# Patient Record
Sex: FEMALE | Race: BLACK OR AFRICAN AMERICAN | Employment: UNEMPLOYED | URBAN - METROPOLITAN AREA
[De-identification: names, ages, dates, MRNs, and addresses within clinical notes are randomized per-mention and may not be internally consistent; named-entity substitution may affect disease eponyms.]

---

## 2019-10-11 ENCOUNTER — OFFICE VISIT (OUTPATIENT)
Dept: FAMILY MEDICINE CLINIC | Age: 39
End: 2019-10-11
Payer: COMMERCIAL

## 2019-10-11 VITALS
OXYGEN SATURATION: 90 % | DIASTOLIC BLOOD PRESSURE: 88 MMHG | TEMPERATURE: 96.5 F | HEART RATE: 78 BPM | RESPIRATION RATE: 16 BRPM | SYSTOLIC BLOOD PRESSURE: 138 MMHG | HEIGHT: 65 IN | WEIGHT: 165.8 LBS | BODY MASS INDEX: 27.63 KG/M2

## 2019-10-11 DIAGNOSIS — Z00.00 PREVENTATIVE HEALTH CARE: ICD-10-CM

## 2019-10-11 DIAGNOSIS — N92.6 MISSED PERIODS: ICD-10-CM

## 2019-10-11 DIAGNOSIS — Z86.79 HISTORY OF HYPERTENSION: Primary | ICD-10-CM

## 2019-10-11 LAB
HCG, URINE, POC: NEGATIVE
Lab: NORMAL
NEGATIVE QC PASS/FAIL: NORMAL
POSITIVE QC PASS/FAIL: NORMAL

## 2019-10-11 PROCEDURE — G8427 DOCREV CUR MEDS BY ELIG CLIN: HCPCS | Performed by: INTERNAL MEDICINE

## 2019-10-11 PROCEDURE — 81025 URINE PREGNANCY TEST: CPT | Performed by: INTERNAL MEDICINE

## 2019-10-11 PROCEDURE — G8484 FLU IMMUNIZE NO ADMIN: HCPCS | Performed by: INTERNAL MEDICINE

## 2019-10-11 PROCEDURE — 99204 OFFICE O/P NEW MOD 45 MIN: CPT | Performed by: INTERNAL MEDICINE

## 2019-10-11 PROCEDURE — 1036F TOBACCO NON-USER: CPT | Performed by: INTERNAL MEDICINE

## 2019-10-11 PROCEDURE — G8419 CALC BMI OUT NRM PARAM NOF/U: HCPCS | Performed by: INTERNAL MEDICINE

## 2019-10-11 ASSESSMENT — PATIENT HEALTH QUESTIONNAIRE - PHQ9
SUM OF ALL RESPONSES TO PHQ9 QUESTIONS 1 & 2: 0
SUM OF ALL RESPONSES TO PHQ QUESTIONS 1-9: 0
2. FEELING DOWN, DEPRESSED OR HOPELESS: 0
SUM OF ALL RESPONSES TO PHQ QUESTIONS 1-9: 0
1. LITTLE INTEREST OR PLEASURE IN DOING THINGS: 0

## 2019-10-12 ASSESSMENT — ENCOUNTER SYMPTOMS
WHEEZING: 0
VOMITING: 0
SHORTNESS OF BREATH: 0
NAUSEA: 0
DIARRHEA: 0
ABDOMINAL PAIN: 0
COUGH: 0

## 2019-10-18 DIAGNOSIS — Z00.00 PREVENTATIVE HEALTH CARE: ICD-10-CM

## 2019-10-18 LAB
ALBUMIN SERPL-MCNC: 4.3 G/DL (ref 3.5–4.6)
ALP BLD-CCNC: 85 U/L (ref 40–130)
ALT SERPL-CCNC: 13 U/L (ref 0–33)
ANION GAP SERPL CALCULATED.3IONS-SCNC: 13 MEQ/L (ref 9–15)
AST SERPL-CCNC: 20 U/L (ref 0–35)
BASOPHILS ABSOLUTE: 0.1 K/UL (ref 0–0.2)
BASOPHILS RELATIVE PERCENT: 1.6 %
BILIRUB SERPL-MCNC: 0.4 MG/DL (ref 0.2–0.7)
BUN BLDV-MCNC: 11 MG/DL (ref 6–20)
CALCIUM SERPL-MCNC: 9.3 MG/DL (ref 8.5–9.9)
CHLORIDE BLD-SCNC: 102 MEQ/L (ref 95–107)
CHOLESTEROL, TOTAL: 167 MG/DL (ref 0–199)
CO2: 25 MEQ/L (ref 20–31)
CREAT SERPL-MCNC: 0.54 MG/DL (ref 0.5–0.9)
EOSINOPHILS ABSOLUTE: 0.2 K/UL (ref 0–0.7)
EOSINOPHILS RELATIVE PERCENT: 2.8 %
GFR AFRICAN AMERICAN: >60
GFR NON-AFRICAN AMERICAN: >60
GLOBULIN: 3.2 G/DL (ref 2.3–3.5)
GLUCOSE BLD-MCNC: 74 MG/DL (ref 70–99)
HBA1C MFR BLD: 5.4 % (ref 4.8–5.9)
HCT VFR BLD CALC: 42.1 % (ref 37–47)
HDLC SERPL-MCNC: 55 MG/DL (ref 40–59)
HEMOGLOBIN: 13.5 G/DL (ref 12–16)
LDL CHOLESTEROL CALCULATED: 98 MG/DL (ref 0–129)
LYMPHOCYTES ABSOLUTE: 2 K/UL (ref 1–4.8)
LYMPHOCYTES RELATIVE PERCENT: 29.1 %
MCH RBC QN AUTO: 27.4 PG (ref 27–31.3)
MCHC RBC AUTO-ENTMCNC: 32.2 % (ref 33–37)
MCV RBC AUTO: 85.1 FL (ref 82–100)
MONOCYTES ABSOLUTE: 0.5 K/UL (ref 0.2–0.8)
MONOCYTES RELATIVE PERCENT: 6.9 %
NEUTROPHILS ABSOLUTE: 4.1 K/UL (ref 1.4–6.5)
NEUTROPHILS RELATIVE PERCENT: 59.6 %
PDW BLD-RTO: 13.4 % (ref 11.5–14.5)
PLATELET # BLD: 243 K/UL (ref 130–400)
POTASSIUM SERPL-SCNC: 4.2 MEQ/L (ref 3.4–4.9)
RBC # BLD: 4.95 M/UL (ref 4.2–5.4)
SODIUM BLD-SCNC: 140 MEQ/L (ref 135–144)
TOTAL PROTEIN: 7.5 G/DL (ref 6.3–8)
TRIGL SERPL-MCNC: 70 MG/DL (ref 0–150)
TSH SERPL DL<=0.05 MIU/L-ACNC: 0.73 UIU/ML (ref 0.44–3.86)
WBC # BLD: 6.9 K/UL (ref 4.8–10.8)

## 2019-10-20 LAB — HIV 1,2 COMBO ANTIGEN/ANTIBODY: NEGATIVE

## 2019-10-24 ENCOUNTER — OFFICE VISIT (OUTPATIENT)
Dept: FAMILY MEDICINE CLINIC | Age: 39
End: 2019-10-24
Payer: COMMERCIAL

## 2019-10-24 VITALS
RESPIRATION RATE: 16 BRPM | TEMPERATURE: 97.7 F | SYSTOLIC BLOOD PRESSURE: 160 MMHG | OXYGEN SATURATION: 91 % | DIASTOLIC BLOOD PRESSURE: 102 MMHG | WEIGHT: 169.4 LBS | HEART RATE: 90 BPM | BODY MASS INDEX: 28.22 KG/M2 | HEIGHT: 65 IN

## 2019-10-24 DIAGNOSIS — N91.1 SECONDARY AMENORRHEA: ICD-10-CM

## 2019-10-24 DIAGNOSIS — I10 UNCONTROLLED STAGE 2 HYPERTENSION: ICD-10-CM

## 2019-10-24 DIAGNOSIS — N91.2 ABSENT PERIODS: Primary | ICD-10-CM

## 2019-10-24 LAB
FOLLICLE STIMULATING HORMONE: 9.4 MIU/ML
HCG, URINE, POC: NEGATIVE
Lab: NORMAL
NEGATIVE QC PASS/FAIL: NORMAL
POSITIVE QC PASS/FAIL: NORMAL
PROLACTIN: 7.6 NG/ML

## 2019-10-24 PROCEDURE — 1036F TOBACCO NON-USER: CPT | Performed by: INTERNAL MEDICINE

## 2019-10-24 PROCEDURE — G8484 FLU IMMUNIZE NO ADMIN: HCPCS | Performed by: INTERNAL MEDICINE

## 2019-10-24 PROCEDURE — G8427 DOCREV CUR MEDS BY ELIG CLIN: HCPCS | Performed by: INTERNAL MEDICINE

## 2019-10-24 PROCEDURE — 81025 URINE PREGNANCY TEST: CPT | Performed by: INTERNAL MEDICINE

## 2019-10-24 PROCEDURE — G8419 CALC BMI OUT NRM PARAM NOF/U: HCPCS | Performed by: INTERNAL MEDICINE

## 2019-10-24 PROCEDURE — 99214 OFFICE O/P EST MOD 30 MIN: CPT | Performed by: INTERNAL MEDICINE

## 2019-10-24 RX ORDER — AMLODIPINE BESYLATE 5 MG/1
5 TABLET ORAL DAILY
Qty: 30 TABLET | Refills: 5 | Status: SHIPPED | OUTPATIENT
Start: 2019-10-24 | End: 2020-07-24 | Stop reason: SDUPTHER

## 2019-10-24 RX ORDER — LOSARTAN POTASSIUM 50 MG/1
50 TABLET ORAL DAILY
Qty: 30 TABLET | Refills: 3 | Status: SHIPPED | OUTPATIENT
Start: 2019-10-24 | End: 2020-07-24 | Stop reason: SDUPTHER

## 2019-10-24 RX ORDER — LOSARTAN POTASSIUM 50 MG/1
50 TABLET ORAL 2 TIMES DAILY
Qty: 30 TABLET | Refills: 3 | Status: SHIPPED | OUTPATIENT
Start: 2019-10-24 | End: 2019-10-24

## 2019-10-24 ASSESSMENT — ENCOUNTER SYMPTOMS
WHEEZING: 0
ABDOMINAL PAIN: 0
SHORTNESS OF BREATH: 0
COUGH: 0
DIARRHEA: 0
VOMITING: 0
NAUSEA: 0

## 2019-10-31 LAB — TESTOSTERONE FREE: 1.6 PG/ML (ref 1.3–9.2)

## 2020-02-07 ENCOUNTER — TELEPHONE (OUTPATIENT)
Dept: FAMILY MEDICINE CLINIC | Age: 40
End: 2020-02-07

## 2020-02-07 NOTE — TELEPHONE ENCOUNTER
Pt called to request a new prescription for birth control. She was not sure if she needs to be seen first, or if this can just be called in. Could you please advise? Thank you.

## 2020-02-25 ENCOUNTER — OFFICE VISIT (OUTPATIENT)
Dept: FAMILY MEDICINE CLINIC | Age: 40
End: 2020-02-25
Payer: COMMERCIAL

## 2020-02-25 VITALS
RESPIRATION RATE: 12 BRPM | OXYGEN SATURATION: 99 % | BODY MASS INDEX: 29.04 KG/M2 | WEIGHT: 174.3 LBS | HEART RATE: 80 BPM | DIASTOLIC BLOOD PRESSURE: 84 MMHG | SYSTOLIC BLOOD PRESSURE: 126 MMHG | HEIGHT: 65 IN | TEMPERATURE: 98.3 F

## 2020-02-25 LAB
HCG, URINE, POC: NEGATIVE
Lab: NORMAL
NEGATIVE QC PASS/FAIL: NORMAL
POSITIVE QC PASS/FAIL: NORMAL

## 2020-02-25 PROCEDURE — G8427 DOCREV CUR MEDS BY ELIG CLIN: HCPCS | Performed by: INTERNAL MEDICINE

## 2020-02-25 PROCEDURE — G8484 FLU IMMUNIZE NO ADMIN: HCPCS | Performed by: INTERNAL MEDICINE

## 2020-02-25 PROCEDURE — G8419 CALC BMI OUT NRM PARAM NOF/U: HCPCS | Performed by: INTERNAL MEDICINE

## 2020-02-25 PROCEDURE — 1036F TOBACCO NON-USER: CPT | Performed by: INTERNAL MEDICINE

## 2020-02-25 PROCEDURE — 81025 URINE PREGNANCY TEST: CPT | Performed by: INTERNAL MEDICINE

## 2020-02-25 PROCEDURE — 99214 OFFICE O/P EST MOD 30 MIN: CPT | Performed by: INTERNAL MEDICINE

## 2020-02-25 RX ORDER — NORETHINDRONE ACETATE AND ETHINYL ESTRADIOL 1MG-20(21)
1 KIT ORAL DAILY
Qty: 1 PACKET | Refills: 3 | Status: SHIPPED | OUTPATIENT
Start: 2020-02-25 | End: 2020-07-01 | Stop reason: SDUPTHER

## 2020-02-25 ASSESSMENT — PATIENT HEALTH QUESTIONNAIRE - PHQ9
SUM OF ALL RESPONSES TO PHQ QUESTIONS 1-9: 0
SUM OF ALL RESPONSES TO PHQ QUESTIONS 1-9: 0
1. LITTLE INTEREST OR PLEASURE IN DOING THINGS: 0
SUM OF ALL RESPONSES TO PHQ9 QUESTIONS 1 & 2: 0
2. FEELING DOWN, DEPRESSED OR HOPELESS: 0

## 2020-02-25 NOTE — PROGRESS NOTES
sexual activity: Not on file   Other Topics Concern    Not on file   Social History Narrative    Not on file     No family history on file. Allergies:  Patient has no known allergies. There is no problem list on file for this patient. Current Outpatient Medications on File Prior to Visit   Medication Sig Dispense Refill    amLODIPine (NORVASC) 5 MG tablet Take 1 tablet by mouth daily 30 tablet 5    losartan (COZAAR) 50 MG tablet Take 1 tablet by mouth daily 30 tablet 3     No current facility-administered medications on file prior to visit. Review of Systems   Constitutional: Negative for chills, diaphoresis, fatigue and fever. HENT: Negative for congestion, ear discharge and ear pain. Respiratory: Negative for cough, shortness of breath and wheezing. Cardiovascular: Negative for chest pain. Gastrointestinal: Negative for abdominal pain, diarrhea, nausea and vomiting. Endocrine: Negative for cold intolerance and heat intolerance. Genitourinary: Negative for dysuria and frequency. Musculoskeletal: Positive for myalgias. Neurological: Negative for dizziness and light-headedness. Objective:   /84   Pulse 80   Temp 98.3 °F (36.8 °C) (Temporal)   Resp 12   Ht 5' 5\" (1.651 m)   Wt 174 lb 4.8 oz (79.1 kg)   LMP 02/03/2020 (Exact Date)   SpO2 99%   Breastfeeding No   BMI 29.01 kg/m²     Physical Exam  Constitutional:       General: She is not in acute distress. Appearance: Normal appearance. She is well-developed. Cardiovascular:      Rate and Rhythm: Normal rate and regular rhythm. Pulses: Normal pulses. Heart sounds: Normal heart sounds. Pulmonary:      Effort: Pulmonary effort is normal. No respiratory distress. Breath sounds: Normal breath sounds. Abdominal:      General: Bowel sounds are normal. There is no distension. Palpations: Abdomen is soft. Tenderness: There is no abdominal tenderness. There is no guarding.    Musculoskeletal: Comments: No groin or thigh swelling/erythema  Marked right medial thigh TTP  Hip ROM full in flexion, extension, adduction, abduction, internal and external rotations   Neurological:      Mental Status: She is alert. Assessment:       Diagnosis Orders   1. Tendinosis of quadriceps tendon  Amb External Referral To Physical Therapy   2. Encounter for initial prescription of contraceptive pills  POC Pregnancy Ur-Qual    norethindrone-ethinyl estradiol (LOESTRIN FE 1/20) 1-20 MG-MCG per tablet   3. History of hypertension         Plan:      Orders Placed This Encounter   Procedures    Amb External Referral To Physical Therapy     Referral Priority:   Routine     Referral Type:   Consult for Advice and Opinion     Requested Specialty:   Physical Therapy     Number of Visits Requested:   1    POC Pregnancy Ur-Qual   Preg test neg. Orders Placed This Encounter   Medications    norethindrone-ethinyl estradiol (LOESTRIN FE 1/20) 1-20 MG-MCG per tablet     Sig: Take 1 tablet by mouth daily     Dispense:  1 packet     Refill:  3     Return in about 1 month (around 3/25/2020) for BP recheck.

## 2020-02-26 ASSESSMENT — ENCOUNTER SYMPTOMS
VOMITING: 0
ABDOMINAL PAIN: 0
SHORTNESS OF BREATH: 0
COUGH: 0
NAUSEA: 0
DIARRHEA: 0
WHEEZING: 0

## 2020-07-01 RX ORDER — NORETHINDRONE ACETATE AND ETHINYL ESTRADIOL 1MG-20(21)
1 KIT ORAL DAILY
Qty: 1 PACKET | Refills: 3 | Status: SHIPPED | OUTPATIENT
Start: 2020-07-01 | End: 2020-12-16 | Stop reason: SDUPTHER

## 2020-07-01 NOTE — TELEPHONE ENCOUNTER
Pharmacy  requesting medication refill. Please approve or deny this request.    Rx requested:  Requested Prescriptions     Pending Prescriptions Disp Refills    norethindrone-ethinyl estradiol (LOESTRIN FE 1/20) 1-20 MG-MCG per tablet 1 packet 3     Sig: Take 1 tablet by mouth daily         Last Office Visit:   2/25/2020      Next Visit Date:  No future appointments.

## 2020-07-10 RX ORDER — NORETHINDRONE ACETATE AND ETHINYL ESTRADIOL AND FERROUS FUMARATE 1MG-20(21)
KIT ORAL
Qty: 28 TABLET | Refills: 0 | OUTPATIENT
Start: 2020-07-10

## 2020-07-24 RX ORDER — LOSARTAN POTASSIUM 50 MG/1
50 TABLET ORAL DAILY
Qty: 90 TABLET | Refills: 3 | Status: SHIPPED | OUTPATIENT
Start: 2020-07-24 | End: 2021-09-16

## 2020-07-24 RX ORDER — AMLODIPINE BESYLATE 5 MG/1
5 TABLET ORAL DAILY
Qty: 90 TABLET | Refills: 5 | Status: SHIPPED | OUTPATIENT
Start: 2020-07-24 | End: 2021-09-16

## 2020-07-24 NOTE — TELEPHONE ENCOUNTER
I got request for med refill. pls ask if she decided to take the BP meds. If still trying diet and exercise only, it's OK by me as long as BP is kept below 130/80.

## 2020-07-28 NOTE — TELEPHONE ENCOUNTER
Patient states she does not take it all the time. She states it does help keep her BP under the radar, so she takes it occasionally.

## 2020-08-12 ENCOUNTER — TELEPHONE (OUTPATIENT)
Dept: PRIMARY CARE CLINIC | Age: 40
End: 2020-08-12

## 2020-08-12 NOTE — TELEPHONE ENCOUNTER
Patient calling in, asking about changing her Birth Control medication, wanted to get Depo injections instead.   She had them in the past and prefers to have them again  Please advise

## 2020-08-25 ENCOUNTER — NURSE ONLY (OUTPATIENT)
Dept: PRIMARY CARE CLINIC | Age: 40
End: 2020-08-25
Payer: COMMERCIAL

## 2020-08-25 PROCEDURE — 96372 THER/PROPH/DIAG INJ SC/IM: CPT | Performed by: INTERNAL MEDICINE

## 2020-08-25 RX ORDER — MEDROXYPROGESTERONE ACETATE 150 MG/ML
150 INJECTION, SUSPENSION INTRAMUSCULAR ONCE
Status: COMPLETED | OUTPATIENT
Start: 2020-08-25 | End: 2020-08-25

## 2020-08-25 RX ADMIN — MEDROXYPROGESTERONE ACETATE 150 MG: 150 INJECTION, SUSPENSION INTRAMUSCULAR at 12:30

## 2020-08-26 ENCOUNTER — TELEPHONE (OUTPATIENT)
Dept: PRIMARY CARE CLINIC | Age: 40
End: 2020-08-26

## 2020-08-26 NOTE — TELEPHONE ENCOUNTER
Dr Amanda Cabrales,  I also tried calling patient, I wasn't able to reach patient. I could only leave our phone number on her vm and no message it said SMS messaging.     I will wait for patient to call us back is all I can do  Elle

## 2020-08-26 NOTE — TELEPHONE ENCOUNTER
----- Message from Jorge Smith MD sent at 8/25/2020 10:10 PM EDT -----  She asked for the depot contraceptive. Did she get this injection yet? I did not yet approve it because I wanted to give consideration to contraindications. First I need a pregnancy test. Please have her come in for poc pregnancy test and I'll cosign it. Second, how has her BP been? I'm concerned that with uncontrolled BP, this she might not be a good idea. I need her to ome into the office to check BP. Also please ask about any hx of migraine, heart disease, breast disease or DVT/PE or fam hx of such. Please let her I know I tried calling but went straight to her voicemail. Thank you.

## 2020-12-16 RX ORDER — NORETHINDRONE ACETATE AND ETHINYL ESTRADIOL 1MG-20(21)
1 KIT ORAL DAILY
Qty: 1 PACKET | Refills: 3 | Status: SHIPPED | OUTPATIENT
Start: 2020-12-16 | End: 2021-05-22 | Stop reason: SDUPTHER

## 2021-01-04 ENCOUNTER — TELEPHONE (OUTPATIENT)
Dept: PRIMARY CARE CLINIC | Age: 41
End: 2021-01-04

## 2021-02-24 ENCOUNTER — OFFICE VISIT (OUTPATIENT)
Dept: PRIMARY CARE CLINIC | Age: 41
End: 2021-02-24
Payer: COMMERCIAL

## 2021-02-24 VITALS
OXYGEN SATURATION: 98 % | BODY MASS INDEX: 28.69 KG/M2 | SYSTOLIC BLOOD PRESSURE: 158 MMHG | WEIGHT: 172.2 LBS | HEIGHT: 65 IN | RESPIRATION RATE: 16 BRPM | DIASTOLIC BLOOD PRESSURE: 106 MMHG | HEART RATE: 85 BPM

## 2021-02-24 DIAGNOSIS — N92.0 MENORRHAGIA WITH REGULAR CYCLE: ICD-10-CM

## 2021-02-24 DIAGNOSIS — Z00.00 PREVENTATIVE HEALTH CARE: ICD-10-CM

## 2021-02-24 DIAGNOSIS — N63.20 LEFT BREAST LUMP: ICD-10-CM

## 2021-02-24 DIAGNOSIS — F32.9 MAJOR DEPRESSIVE DISORDER WITH CURRENT ACTIVE EPISODE, UNSPECIFIED DEPRESSION EPISODE SEVERITY, UNSPECIFIED WHETHER RECURRENT: Primary | ICD-10-CM

## 2021-02-24 DIAGNOSIS — L30.9 DERMATITIS: ICD-10-CM

## 2021-02-24 DIAGNOSIS — A60.00 GENITAL HERPES SIMPLEX, UNSPECIFIED SITE: ICD-10-CM

## 2021-02-24 DIAGNOSIS — F32.9 MAJOR DEPRESSIVE DISORDER WITH CURRENT ACTIVE EPISODE, UNSPECIFIED DEPRESSION EPISODE SEVERITY, UNSPECIFIED WHETHER RECURRENT: ICD-10-CM

## 2021-02-24 LAB
ALBUMIN SERPL-MCNC: 4.2 G/DL (ref 3.5–4.6)
ALP BLD-CCNC: 83 U/L (ref 40–130)
ALT SERPL-CCNC: 10 U/L (ref 0–33)
ANION GAP SERPL CALCULATED.3IONS-SCNC: 10 MEQ/L (ref 9–15)
AST SERPL-CCNC: 11 U/L (ref 0–35)
BASOPHILS ABSOLUTE: 0.1 K/UL (ref 0–0.2)
BASOPHILS RELATIVE PERCENT: 1 %
BILIRUB SERPL-MCNC: 0.4 MG/DL (ref 0.2–0.7)
BUN BLDV-MCNC: 8 MG/DL (ref 6–20)
CALCIUM SERPL-MCNC: 9 MG/DL (ref 8.5–9.9)
CHLORIDE BLD-SCNC: 100 MEQ/L (ref 95–107)
CO2: 23 MEQ/L (ref 20–31)
CREAT SERPL-MCNC: 0.73 MG/DL (ref 0.5–0.9)
EOSINOPHILS ABSOLUTE: 0.1 K/UL (ref 0–0.7)
EOSINOPHILS RELATIVE PERCENT: 0.7 %
GFR AFRICAN AMERICAN: >60
GFR NON-AFRICAN AMERICAN: >60
GLOBULIN: 3.3 G/DL (ref 2.3–3.5)
GLUCOSE BLD-MCNC: 85 MG/DL (ref 70–99)
HCG, URINE, POC: NEGATIVE
HCT VFR BLD CALC: 40.7 % (ref 37–47)
HEMOGLOBIN: 13 G/DL (ref 12–16)
LYMPHOCYTES ABSOLUTE: 2.7 K/UL (ref 1–4.8)
LYMPHOCYTES RELATIVE PERCENT: 23.6 %
Lab: NEGATIVE
MCH RBC QN AUTO: 27.3 PG (ref 27–31.3)
MCHC RBC AUTO-ENTMCNC: 31.9 % (ref 33–37)
MCV RBC AUTO: 85.4 FL (ref 82–100)
MONOCYTES ABSOLUTE: 0.8 K/UL (ref 0.2–0.8)
MONOCYTES RELATIVE PERCENT: 7.4 %
NEGATIVE QC PASS/FAIL: NORMAL
NEUTROPHILS ABSOLUTE: 7.6 K/UL (ref 1.4–6.5)
NEUTROPHILS RELATIVE PERCENT: 67.3 %
PDW BLD-RTO: 14.6 % (ref 11.5–14.5)
PLATELET # BLD: 278 K/UL (ref 130–400)
POSITIVE QC PASS/FAIL: NORMAL
POTASSIUM SERPL-SCNC: 3.6 MEQ/L (ref 3.4–4.9)
RBC # BLD: 4.76 M/UL (ref 4.2–5.4)
SODIUM BLD-SCNC: 133 MEQ/L (ref 135–144)
TOTAL PROTEIN: 7.5 G/DL (ref 6.3–8)
TSH REFLEX: 1 UIU/ML (ref 0.44–3.86)
WBC # BLD: 11.3 K/UL (ref 4.8–10.8)

## 2021-02-24 PROCEDURE — 99215 OFFICE O/P EST HI 40 MIN: CPT | Performed by: INTERNAL MEDICINE

## 2021-02-24 PROCEDURE — G8484 FLU IMMUNIZE NO ADMIN: HCPCS | Performed by: INTERNAL MEDICINE

## 2021-02-24 PROCEDURE — 81025 URINE PREGNANCY TEST: CPT | Performed by: INTERNAL MEDICINE

## 2021-02-24 PROCEDURE — 1036F TOBACCO NON-USER: CPT | Performed by: INTERNAL MEDICINE

## 2021-02-24 PROCEDURE — G8427 DOCREV CUR MEDS BY ELIG CLIN: HCPCS | Performed by: INTERNAL MEDICINE

## 2021-02-24 PROCEDURE — G8419 CALC BMI OUT NRM PARAM NOF/U: HCPCS | Performed by: INTERNAL MEDICINE

## 2021-02-24 RX ORDER — ESCITALOPRAM OXALATE 5 MG/1
5 TABLET ORAL DAILY
Qty: 30 TABLET | Refills: 1 | Status: SHIPPED | OUTPATIENT
Start: 2021-02-24 | End: 2021-04-07 | Stop reason: DRUGHIGH

## 2021-02-24 RX ORDER — VALACYCLOVIR HYDROCHLORIDE 1 G/1
TABLET, FILM COATED ORAL
Qty: 50 TABLET | Refills: 3 | Status: SHIPPED | OUTPATIENT
Start: 2021-02-24 | End: 2022-04-04 | Stop reason: SDUPTHER

## 2021-02-24 SDOH — ECONOMIC STABILITY: TRANSPORTATION INSECURITY
IN THE PAST 12 MONTHS, HAS THE LACK OF TRANSPORTATION KEPT YOU FROM MEDICAL APPOINTMENTS OR FROM GETTING MEDICATIONS?: NO

## 2021-02-24 SDOH — ECONOMIC STABILITY: FOOD INSECURITY: WITHIN THE PAST 12 MONTHS, THE FOOD YOU BOUGHT JUST DIDN'T LAST AND YOU DIDN'T HAVE MONEY TO GET MORE.: NEVER TRUE

## 2021-02-24 SDOH — ECONOMIC STABILITY: TRANSPORTATION INSECURITY
IN THE PAST 12 MONTHS, HAS LACK OF TRANSPORTATION KEPT YOU FROM MEETINGS, WORK, OR FROM GETTING THINGS NEEDED FOR DAILY LIVING?: NO

## 2021-02-24 ASSESSMENT — PATIENT HEALTH QUESTIONNAIRE - PHQ9
SUM OF ALL RESPONSES TO PHQ9 QUESTIONS 1 & 2: 2
SUM OF ALL RESPONSES TO PHQ QUESTIONS 1-9: 2
1. LITTLE INTEREST OR PLEASURE IN DOING THINGS: 1
2. FEELING DOWN, DEPRESSED OR HOPELESS: 1

## 2021-02-24 NOTE — PROGRESS NOTES
Subjective:      Patient ID: Caroline Nolen is a 42 y.o. female      Breast itch x 1 month  Lack of motivation x 2 weeks  Tongue pain x 2 weeks  HPI  Pt presents with 2 weeks of itch in the right areola. NO rash, no pain or right breast lump, no nipple discharge. Hx left breast lump, claims work up neg. No record seen. Hx genital herpes. Requests refill of Valtrex. Tongue pain ans swelling, no sores. BP not controlled, not always med compliant. Heavy menstrual bleed with clots on OCP. Loss of interest in doing things x 2 weeks  Depression: yes   Suicidal ideations: none  Energy level: low  Excessive guilt:yes  Poor concentration: yes  Appetite: yes   Psychomotor retardation: yes  Sleep: good  Irritability: yes  Euphoria: none   Anxiety/Worry: yes   Fatigue: yes   Restlessness: yes  Muscle tension: yes   No hallucinations   Attests to alcohol binge drinking-4 drinks last night. History reviewed. No pertinent past medical history. History reviewed. No pertinent surgical history.   Social History     Socioeconomic History    Marital status:      Spouse name: Not on file    Number of children: Not on file    Years of education: Not on file    Highest education level: Not on file   Occupational History    Not on file   Social Needs    Financial resource strain: Not very hard    Food insecurity     Worry: Never true     Inability: Never true    Transportation needs     Medical: No     Non-medical: No   Tobacco Use    Smoking status: Never Smoker    Smokeless tobacco: Never Used   Substance and Sexual Activity    Alcohol use: Not Currently    Drug use: Never    Sexual activity: Yes   Lifestyle    Physical activity     Days per week: Not on file     Minutes per session: Not on file    Stress: Not on file   Relationships    Social connections     Talks on phone: Not on file     Gets together: Not on file     Attends Christian service: Not on file 5. Preventative health care  TSH with Reflex    CBC With Auto Differential    Comprehensive Metabolic Panel    Hepatitis C Antibody   6. Genital herpes simplex, unspecified site  valACYclovir (VALTREX) 1 g tablet    Last flare was last yr. Will refill VAltrex     Plan:      Orders Placed This Encounter   Procedures    US NON OB TRANSVAGINAL     US pelvis too     Standing Status:   Future     Standing Expiration Date:   2022     Scheduling Instructions:      US pelvis too    TIP DIGITAL DIAGNOSTIC W OR WO CAD BILATERAL     Standing Status:   Future     Standing Expiration Date:   2022    US BREASt COMPLETE LEFT     Standing Status:   Future     Standing Expiration Date:   2022    TSH with Reflex     Standing Status:   Future     Number of Occurrences:   1     Standing Expiration Date:   2022    CBC With Auto Differential     Standing Status:   Future     Number of Occurrences:   1     Standing Expiration Date:   2022    Comprehensive Metabolic Panel     Standing Status:   Future     Number of Occurrences:   1     Standing Expiration Date:   2022    Hepatitis C Antibody     Standing Status:   Future     Number of Occurrences:   1     Standing Expiration Date:   2022   Michelle Escalante MD, [place]     Referral Priority:   Routine     Referral Type:   Eval and Treat     Referral Reason:   Specialty Services Required     Referred to Provider:   Erick Ortega MD     Requested Specialty:   Psychiatry     Number of Visits Requested:   1    POC Pregnancy Ur-Qual     Orders Placed This Encounter   Medications    valACYclovir (VALTREX) 1 g tablet     Si g once daily for 5 days when you get an outbreak of herpes     Dispense:  50 tablet     Refill:  3    escitalopram (LEXAPRO) 5 MG tablet     Sig: Take 1 tablet by mouth daily Take one pill a day. Generic equivalent acceptable, unless otherwise noted.      Dispense:  30 tablet     Refill:  1  triamcinolone (KENALOG) 0.1 % ointment     Sig: Use twice daily for 2 weeks     Dispense:  1 Tube     Refill:  1   Declines LACADA or alcohol cessation help. Wants to self-quit. Return in about 2 weeks (around 3/10/2021) for to r/o sucidal ideation.

## 2021-02-25 LAB — HEPATITIS C ANTIBODY INTERPRETATION: NORMAL

## 2021-02-25 ASSESSMENT — ENCOUNTER SYMPTOMS
VOMITING: 0
WHEEZING: 0
SHORTNESS OF BREATH: 0
COUGH: 0
ABDOMINAL PAIN: 0
DIARRHEA: 0
NAUSEA: 0

## 2021-03-08 ENCOUNTER — HOSPITAL ENCOUNTER (OUTPATIENT)
Dept: ULTRASOUND IMAGING | Age: 41
Discharge: HOME | End: 2021-03-10
Payer: COMMERCIAL

## 2021-03-08 DIAGNOSIS — N92.0 MENORRHAGIA WITH REGULAR CYCLE: ICD-10-CM

## 2021-03-08 PROCEDURE — 76830 TRANSVAGINAL US NON-OB: CPT

## 2021-03-08 PROCEDURE — 76856 US EXAM PELVIC COMPLETE: CPT

## 2021-03-10 ENCOUNTER — OFFICE VISIT (OUTPATIENT)
Dept: PRIMARY CARE CLINIC | Age: 41
End: 2021-03-10
Payer: COMMERCIAL

## 2021-03-10 VITALS
DIASTOLIC BLOOD PRESSURE: 83 MMHG | RESPIRATION RATE: 14 BRPM | TEMPERATURE: 97.2 F | SYSTOLIC BLOOD PRESSURE: 129 MMHG | WEIGHT: 171 LBS | OXYGEN SATURATION: 99 % | HEIGHT: 65 IN | BODY MASS INDEX: 28.49 KG/M2 | HEART RATE: 78 BPM

## 2021-03-10 DIAGNOSIS — I10 HYPERTENSION, UNSPECIFIED TYPE: ICD-10-CM

## 2021-03-10 DIAGNOSIS — F32.9 MAJOR DEPRESSIVE DISORDER WITH CURRENT ACTIVE EPISODE, UNSPECIFIED DEPRESSION EPISODE SEVERITY, UNSPECIFIED WHETHER RECURRENT: Primary | ICD-10-CM

## 2021-03-10 DIAGNOSIS — R41.840 ATTENTION DEFICIT: ICD-10-CM

## 2021-03-10 DIAGNOSIS — N63.20 LEFT BREAST LUMP: ICD-10-CM

## 2021-03-10 PROCEDURE — G8427 DOCREV CUR MEDS BY ELIG CLIN: HCPCS | Performed by: INTERNAL MEDICINE

## 2021-03-10 PROCEDURE — 1036F TOBACCO NON-USER: CPT | Performed by: INTERNAL MEDICINE

## 2021-03-10 PROCEDURE — G8484 FLU IMMUNIZE NO ADMIN: HCPCS | Performed by: INTERNAL MEDICINE

## 2021-03-10 PROCEDURE — 99214 OFFICE O/P EST MOD 30 MIN: CPT | Performed by: INTERNAL MEDICINE

## 2021-03-10 PROCEDURE — G8419 CALC BMI OUT NRM PARAM NOF/U: HCPCS | Performed by: INTERNAL MEDICINE

## 2021-03-10 ASSESSMENT — PATIENT HEALTH QUESTIONNAIRE - PHQ9
SUM OF ALL RESPONSES TO PHQ QUESTIONS 1-9: 9
1. LITTLE INTEREST OR PLEASURE IN DOING THINGS: 3
10. IF YOU CHECKED OFF ANY PROBLEMS, HOW DIFFICULT HAVE THESE PROBLEMS MADE IT FOR YOU TO DO YOUR WORK, TAKE CARE OF THINGS AT HOME, OR GET ALONG WITH OTHER PEOPLE: 2
7. TROUBLE CONCENTRATING ON THINGS, SUCH AS READING THE NEWSPAPER OR WATCHING TELEVISION: 3
2. FEELING DOWN, DEPRESSED OR HOPELESS: 0
3. TROUBLE FALLING OR STAYING ASLEEP: 0
9. THOUGHTS THAT YOU WOULD BE BETTER OFF DEAD, OR OF HURTING YOURSELF: 0
6. FEELING BAD ABOUT YOURSELF - OR THAT YOU ARE A FAILURE OR HAVE LET YOURSELF OR YOUR FAMILY DOWN: 1
4. FEELING TIRED OR HAVING LITTLE ENERGY: 1

## 2021-03-10 NOTE — PROGRESS NOTES
Subjective:      Patient ID: Tomasa Jiménez is a 42 y.o. female    Depression and hypertension f/u  HPI  Pt presents for follow up regarding management of anxiety and depression. Seen 2 weeks ago for major depression and placed on Lexapro. Today, depression and anxiety much improved. Attests to good sleep, energy and appetite. No suicidal ideations or hallucinations. However still having difficulty with concentration. Yet to make appt with  Psychiatry. BP well controlled on losartan and amlodipine. Await mamamogram completion for ? ?breast lump. History reviewed. No pertinent past medical history. History reviewed. No pertinent surgical history.   Social History     Socioeconomic History    Marital status:      Spouse name: Not on file    Number of children: Not on file    Years of education: Not on file    Highest education level: Not on file   Occupational History    Not on file   Social Needs    Financial resource strain: Not very hard    Food insecurity     Worry: Never true     Inability: Never true    Transportation needs     Medical: No     Non-medical: No   Tobacco Use    Smoking status: Never Smoker    Smokeless tobacco: Never Used   Substance and Sexual Activity    Alcohol use: Not Currently    Drug use: Never    Sexual activity: Yes   Lifestyle    Physical activity     Days per week: Not on file     Minutes per session: Not on file    Stress: Not on file   Relationships    Social connections     Talks on phone: Not on file     Gets together: Not on file     Attends Zoroastrianism service: Not on file     Active member of club or organization: Not on file     Attends meetings of clubs or organizations: Not on file     Relationship status: Not on file    Intimate partner violence     Fear of current or ex partner: Not on file     Emotionally abused: Not on file     Physically abused: Not on file     Forced sexual activity: Not on file   Other Topics Concern    Not on file Cardiovascular:      Rate and Rhythm: Normal rate and regular rhythm. Pulses: Normal pulses. Heart sounds: Normal heart sounds, S1 normal and S2 normal.   Pulmonary:      Effort: Pulmonary effort is normal. No respiratory distress. Breath sounds: Normal breath sounds. No wheezing or rales. Chest:      Chest wall: No tenderness. Abdominal:      General: Bowel sounds are normal.      Tenderness: There is no abdominal tenderness. Neurological:      General: No focal deficit present. Mental Status: She is alert. Cranial Nerves: No cranial nerve deficit. Psychiatric:         Mood and Affect: Mood normal.         Thought Content: Thought content normal.       Assessment:       Diagnosis Orders   1. Major depressive disorder with current active episode, unspecified depression episode severity, unspecified whether recurrent Improving     Still with inadequate concetration ability. Continue meds for the next few weeks   2. Attention deficit      likely from depression. Await psych appt   3. Hypertension, unspecified type Controlled    4. Left breast lump      await mammogram     Plan:      No orders of the defined types were placed in this encounter. No orders of the defined types were placed in this encounter. Return in about 4 weeks (around 4/7/2021) for follow up.

## 2021-03-11 ASSESSMENT — ENCOUNTER SYMPTOMS
VOMITING: 0
SHORTNESS OF BREATH: 0
DIARRHEA: 0
NAUSEA: 0
ABDOMINAL PAIN: 0
COUGH: 0
WHEEZING: 0

## 2021-04-01 ENCOUNTER — HOSPITAL ENCOUNTER (OUTPATIENT)
Dept: ULTRASOUND IMAGING | Age: 41
Discharge: HOME | End: 2021-04-03
Payer: COMMERCIAL

## 2021-04-01 ENCOUNTER — HOSPITAL ENCOUNTER (OUTPATIENT)
Dept: WOMENS IMAGING | Age: 41
Discharge: HOME | End: 2021-04-03
Payer: COMMERCIAL

## 2021-04-01 DIAGNOSIS — N63.20 LEFT BREAST LUMP: ICD-10-CM

## 2021-04-01 DIAGNOSIS — R92.8 ABNORMAL MAMMOGRAM: ICD-10-CM

## 2021-04-01 PROCEDURE — 76642 ULTRASOUND BREAST LIMITED: CPT

## 2021-04-01 PROCEDURE — G0279 TOMOSYNTHESIS, MAMMO: HCPCS

## 2021-04-02 DIAGNOSIS — N63.0 BREAST MASS SEEN ON MAMMOGRAM: Primary | ICD-10-CM

## 2021-04-07 ENCOUNTER — OFFICE VISIT (OUTPATIENT)
Dept: PRIMARY CARE CLINIC | Age: 41
End: 2021-04-07
Payer: COMMERCIAL

## 2021-04-07 ENCOUNTER — TELEPHONE (OUTPATIENT)
Dept: PRIMARY CARE CLINIC | Age: 41
End: 2021-04-07

## 2021-04-07 VITALS
HEART RATE: 77 BPM | RESPIRATION RATE: 18 BRPM | WEIGHT: 172.4 LBS | HEIGHT: 65 IN | OXYGEN SATURATION: 98 % | BODY MASS INDEX: 28.72 KG/M2 | DIASTOLIC BLOOD PRESSURE: 90 MMHG | SYSTOLIC BLOOD PRESSURE: 130 MMHG

## 2021-04-07 DIAGNOSIS — I10 HYPERTENSION, UNSPECIFIED TYPE: ICD-10-CM

## 2021-04-07 DIAGNOSIS — F32.9 MAJOR DEPRESSIVE DISORDER WITH CURRENT ACTIVE EPISODE, UNSPECIFIED DEPRESSION EPISODE SEVERITY, UNSPECIFIED WHETHER RECURRENT: Primary | ICD-10-CM

## 2021-04-07 PROCEDURE — 99214 OFFICE O/P EST MOD 30 MIN: CPT | Performed by: INTERNAL MEDICINE

## 2021-04-07 PROCEDURE — G8419 CALC BMI OUT NRM PARAM NOF/U: HCPCS | Performed by: INTERNAL MEDICINE

## 2021-04-07 PROCEDURE — G8427 DOCREV CUR MEDS BY ELIG CLIN: HCPCS | Performed by: INTERNAL MEDICINE

## 2021-04-07 PROCEDURE — 1036F TOBACCO NON-USER: CPT | Performed by: INTERNAL MEDICINE

## 2021-04-07 RX ORDER — ESCITALOPRAM OXALATE 10 MG/1
10 TABLET ORAL DAILY
Qty: 60 TABLET | Refills: 3 | Status: SHIPPED | OUTPATIENT
Start: 2021-04-07 | End: 2021-04-15 | Stop reason: SDUPTHER

## 2021-04-07 ASSESSMENT — ENCOUNTER SYMPTOMS
ABDOMINAL PAIN: 0
VOMITING: 0
WHEEZING: 0
SHORTNESS OF BREATH: 0
NAUSEA: 0
DIARRHEA: 0
SORE THROAT: 0
COUGH: 0

## 2021-04-07 NOTE — PROGRESS NOTES
Subjective:      Patient ID: Cristo Mccain is a 42 y.o. female    Depression and anxiety follow up  HPI  Pt presents for follow up regarding management of anxiety and depression. Currently on Lexapro 5mg. Today, depression and anxiety much improved. Attests to good sleep, energy and appetite. No suicidal ideations or hallucinations. However still having difficulty with concentration and motivation. Yet to make appt with Psychiatry. Very worried about her lack of motivation hampering her productivity. History reviewed. No pertinent past medical history. History reviewed. No pertinent surgical history.   Social History     Socioeconomic History    Marital status:      Spouse name: Not on file    Number of children: Not on file    Years of education: Not on file    Highest education level: Not on file   Occupational History    Not on file   Social Needs    Financial resource strain: Not very hard    Food insecurity     Worry: Never true     Inability: Never true    Transportation needs     Medical: No     Non-medical: No   Tobacco Use    Smoking status: Never Smoker    Smokeless tobacco: Never Used   Substance and Sexual Activity    Alcohol use: Not Currently    Drug use: Never    Sexual activity: Yes   Lifestyle    Physical activity     Days per week: Not on file     Minutes per session: Not on file    Stress: Not on file   Relationships    Social connections     Talks on phone: Not on file     Gets together: Not on file     Attends Muslim service: Not on file     Active member of club or organization: Not on file     Attends meetings of clubs or organizations: Not on file     Relationship status: Not on file    Intimate partner violence     Fear of current or ex partner: Not on file     Emotionally abused: Not on file     Physically abused: Not on file     Forced sexual activity: Not on file   Other Topics Concern    Not on file   Social History Narrative    Not on file     History reviewed. No pertinent family history. Allergies:  Patient has no known allergies. There is no problem list on file for this patient. Current Outpatient Medications on File Prior to Visit   Medication Sig Dispense Refill    valACYclovir (VALTREX) 1 g tablet 1 g once daily for 5 days when you get an outbreak of herpes 50 tablet 3    triamcinolone (KENALOG) 0.1 % ointment Use twice daily for 2 weeks 1 Tube 1    norethindrone-ethinyl estradiol (LOESTRIN FE 1/20) 1-20 MG-MCG per tablet Take 1 tablet by mouth daily 1 packet 3    amLODIPine (NORVASC) 5 MG tablet Take 1 tablet by mouth daily 90 tablet 5    losartan (COZAAR) 50 MG tablet Take 1 tablet by mouth daily 90 tablet 3     No current facility-administered medications on file prior to visit. Review of Systems   Constitutional: Negative for chills, diaphoresis, fatigue and fever. HENT: Negative for congestion, ear discharge, ear pain and sore throat. Respiratory: Negative for cough, shortness of breath and wheezing. Cardiovascular: Negative for chest pain. Gastrointestinal: Negative for abdominal pain, diarrhea, nausea and vomiting. Endocrine: Negative for cold intolerance and heat intolerance. Genitourinary: Negative for dysuria and frequency. Neurological: Negative for dizziness and light-headedness. Psychiatric/Behavioral: Positive for decreased concentration. Negative for confusion, dysphoric mood, sleep disturbance and suicidal ideas. The patient is not nervous/anxious. Objective:   BP (!) 130/90 (Site: Left Upper Arm, Position: Sitting, Cuff Size: Medium Adult)   Pulse 77   Resp 18   Ht 5' 5\" (1.651 m)   Wt 172 lb 6.4 oz (78.2 kg)   LMP 03/07/2021 (Approximate)   SpO2 98%   BMI 28.69 kg/m²     Physical Exam  Constitutional:       General: She is not in acute distress. Appearance: She is not diaphoretic. Cardiovascular:      Rate and Rhythm: Normal rate and regular rhythm. Pulses: Normal pulses.       Heart sounds: Normal heart sounds, S1 normal and S2 normal. No murmur. Pulmonary:      Effort: Pulmonary effort is normal. No respiratory distress. Breath sounds: Normal breath sounds. No wheezing or rales. Chest:      Chest wall: No tenderness. Abdominal:      General: Bowel sounds are normal.      Tenderness: There is no abdominal tenderness. Neurological:      General: No focal deficit present. Mental Status: She is alert. Cranial Nerves: No cranial nerve deficit. Psychiatric:         Mood and Affect: Mood normal.         Thought Content: Thought content normal.       Assessment:       Diagnosis Orders   1. Major depressive disorder with current active episode, unspecified depression episode severity, unspecified whether recurrent Stable, but not controlled escitalopram (LEXAPRO) 10 MG tablet    Ran Torres, PhD, Psychology, [facility] psych appt. Inattention could be due to inadequately controlled depression or ADD. will double Lexapro dose    2. Hypertension, unspecified type Inadequately Controlled     due to depression and anxiety       Plan:      Orders Placed This Encounter   Procedures   Ran Torres, PhD, Psychology, [place]     Referral Priority:   Routine     Referral Type:   Eval and Treat     Referral Reason:   Specialty Services Required     Referred to Provider:   Radhika Rivera, PhD     Requested Specialty:   Psychology     Number of Visits Requested:   1     Orders Placed This Encounter   Medications    escitalopram (LEXAPRO) 10 MG tablet     Sig: Take 1 tablet by mouth daily     Dispense:  60 tablet     Refill:  3     DC Lexapro 5mg daily     Return in about 2 weeks (around 4/21/2021) for to r/o sucidal ideation, assessment of response to treatment, BP recheck.

## 2021-04-07 NOTE — TELEPHONE ENCOUNTER
Per Mini at Dr. Rajan Blocker office, they contaced her 3 times and referral was closed. They need you to put in a new referral for Dr. Kimberly Rodriguez.

## 2021-04-08 RX ORDER — LIDOCAINE HYDROCHLORIDE 10 MG/ML
10 INJECTION, SOLUTION INFILTRATION; PERINEURAL ONCE
Status: CANCELLED | OUTPATIENT
Start: 2021-04-12

## 2021-04-12 ENCOUNTER — OFFICE VISIT (OUTPATIENT)
Dept: SURGERY | Age: 41
End: 2021-04-12
Payer: COMMERCIAL

## 2021-04-12 VITALS
RESPIRATION RATE: 18 BRPM | SYSTOLIC BLOOD PRESSURE: 134 MMHG | HEIGHT: 65 IN | HEART RATE: 96 BPM | WEIGHT: 170.6 LBS | BODY MASS INDEX: 28.42 KG/M2 | DIASTOLIC BLOOD PRESSURE: 87 MMHG

## 2021-04-12 DIAGNOSIS — N63.0 BREAST MASS: ICD-10-CM

## 2021-04-12 DIAGNOSIS — N60.11 FIBROCYSTIC BREAST CHANGES OF BOTH BREASTS: ICD-10-CM

## 2021-04-12 DIAGNOSIS — E66.3 OVERWEIGHT (BMI 25.0-29.9): ICD-10-CM

## 2021-04-12 DIAGNOSIS — R92.8 ABNORMAL MAMMOGRAM OF RIGHT BREAST: ICD-10-CM

## 2021-04-12 DIAGNOSIS — N60.12 FIBROCYSTIC BREAST CHANGES OF BOTH BREASTS: ICD-10-CM

## 2021-04-12 DIAGNOSIS — R92.8 ABNORMAL MAMMOGRAM OF LEFT BREAST: Primary | ICD-10-CM

## 2021-04-12 PROCEDURE — 19083 BX BREAST 1ST LESION US IMAG: CPT | Performed by: SURGERY

## 2021-04-12 PROCEDURE — 76942 ECHO GUIDE FOR BIOPSY: CPT | Performed by: SURGERY

## 2021-04-12 PROCEDURE — 19084 BX BREAST ADD LESION US IMAG: CPT | Performed by: SURGERY

## 2021-04-12 PROCEDURE — 99204 OFFICE O/P NEW MOD 45 MIN: CPT | Performed by: SURGERY

## 2021-04-12 PROCEDURE — G8427 DOCREV CUR MEDS BY ELIG CLIN: HCPCS | Performed by: SURGERY

## 2021-04-12 PROCEDURE — G8419 CALC BMI OUT NRM PARAM NOF/U: HCPCS | Performed by: SURGERY

## 2021-04-12 PROCEDURE — 1036F TOBACCO NON-USER: CPT | Performed by: SURGERY

## 2021-04-12 RX ORDER — LIDOCAINE HYDROCHLORIDE AND EPINEPHRINE 10; 10 MG/ML; UG/ML
10 INJECTION, SOLUTION INFILTRATION; PERINEURAL ONCE
Status: COMPLETED | OUTPATIENT
Start: 2021-04-12 | End: 2021-04-12

## 2021-04-12 RX ADMIN — LIDOCAINE HYDROCHLORIDE AND EPINEPHRINE 10 ML: 10; 10 INJECTION, SOLUTION INFILTRATION; PERINEURAL at 10:01

## 2021-04-12 RX ADMIN — LIDOCAINE HYDROCHLORIDE AND EPINEPHRINE 10 ML: 10; 10 INJECTION, SOLUTION INFILTRATION; PERINEURAL at 10:13

## 2021-04-12 RX ADMIN — Medication 2 MEQ: at 10:01

## 2021-04-12 RX ADMIN — LIDOCAINE HYDROCHLORIDE AND EPINEPHRINE 10 ML: 10; 10 INJECTION, SOLUTION INFILTRATION; PERINEURAL at 09:47

## 2021-04-12 RX ADMIN — Medication 3 MEQ: at 09:47

## 2021-04-12 RX ADMIN — Medication 2 MEQ: at 10:13

## 2021-04-12 ASSESSMENT — ENCOUNTER SYMPTOMS
ABDOMINAL PAIN: 0
SORE THROAT: 0
NAUSEA: 0
COUGH: 0
BACK PAIN: 1
VOMITING: 0
CHEST TIGHTNESS: 0
COLOR CHANGE: 0
SHORTNESS OF BREATH: 0

## 2021-04-12 NOTE — PROGRESS NOTES
NEW BREAST PATIENT         SERVICE DATE: 4/12/21  SERVICE TIME:  9:10 AM EDT    REFERRED BY:  Zakia Liriano MD  REASON FOR TODAY'S VISIT:    Chief Complaint   Patient presents with    New Patient    Abnormal Mammogram     BIRADS 4 Bilat Breasts, HX of Fibrocystic breasts, has had lumpy breasts since about the age of 11, patient cannot feel any changes in breasts, denies nipple drainage, skin changes, and breast pain bilat breasts    Abnormal Ultrasound      CHAPERONE WAS OFFERED, PATIENT RESPONDED: no    HISTORY AND CHIEF COMPLAINT:  Nicole Niño is a 42 y.o.  female who is here for a New Patient, Abnormal Mammogram (BIRADS 4 Bilat Breasts, HX of Fibrocystic breasts, has had lumpy breasts since about the age of 11, patient cannot feel any changes in breasts, denies nipple drainage, skin changes, and breast pain bilat breasts), and Abnormal Ultrasound  she feels the left breast mass is larger      BREAST HISTORY  Her past breast history (prior to this encounter) is as follows: Abnormal mammogram:   Yes Bilat Breast BIRADS 4  Abnormal Breast US:  Yes, Bilat Breast BIRADS 4  Breast biopsy:    No  Breast cysts:    Yes, Bilat breast since age 11  Breast surgery:    No  Breast cancer              No  History of Breastfeeding: Yes   Currently Breastfeeding: No      RISK FACTORS FOR BREAST CANCER:  Family History of Breast Cancer: No.  History of ovarian cancer: no  Ashkenazi Ancestry: no  Age at the birth of first child: 21  Age at the onset of menses: 14  Age at menopause: N/A   Hormonal therapy: no  Postmenopausal obesity: n/a    BRA SIZE: xlg    History reviewed. No pertinent past medical history. History reviewed. No pertinent surgical history.   Family History   Problem Relation Age of Onset    High Blood Pressure Mother     Diabetes Maternal Uncle     Diabetes Maternal Grandfather     Diabetes Maternal Uncle      Social History     Socioeconomic History    Marital status:      Spouse name: Not on file    Number of children: Not on file    Years of education: Not on file    Highest education level: Not on file   Occupational History    Not on file   Social Needs    Financial resource strain: Not very hard    Food insecurity     Worry: Never true     Inability: Never true    Transportation needs     Medical: No     Non-medical: No   Tobacco Use    Smoking status: Never Smoker    Smokeless tobacco: Never Used   Substance and Sexual Activity    Alcohol use: Yes     Frequency: 2-3 times a week    Drug use: Never    Sexual activity: Yes   Lifestyle    Physical activity     Days per week: Not on file     Minutes per session: Not on file    Stress: Not on file   Relationships    Social connections     Talks on phone: Not on file     Gets together: Not on file     Attends Temple service: Not on file     Active member of club or organization: Not on file     Attends meetings of clubs or organizations: Not on file     Relationship status: Not on file    Intimate partner violence     Fear of current or ex partner: Not on file     Emotionally abused: Not on file     Physically abused: Not on file     Forced sexual activity: Not on file   Other Topics Concern    Not on file   Social History Narrative    Not on file       Review of Systems   Constitutional: Negative for activity change, appetite change, chills, diaphoresis, fatigue, fever and unexpected weight change. HENT: Negative for congestion, ear pain, hearing loss, mouth sores, nosebleeds and sore throat. Respiratory: Negative for cough, chest tightness and shortness of breath. Cardiovascular: Negative for chest pain, palpitations and leg swelling. Gastrointestinal: Negative for abdominal pain, nausea and vomiting. Endocrine: Negative for cold intolerance, heat intolerance, polydipsia, polyphagia and polyuria. Genitourinary: Negative for difficulty urinating, menstrual problem and vaginal bleeding.    Musculoskeletal: Positive for back pain (thinks she slept wrong). Negative for neck pain and neck stiffness. Skin: Negative for color change, pallor, rash and wound. Allergic/Immunologic: Negative for environmental allergies and immunocompromised state. Neurological: Negative for weakness. Hematological: Does not bruise/bleed easily. Psychiatric/Behavioral: Negative for agitation, confusion, sleep disturbance and suicidal ideas. The patient is not nervous/anxious. Have you ever tested positive for AIDS? no  Have you ever tested positive for Hepatitis? no    ANTICOAGULANT MEDICATIONS:  none    SOCIAL HISTORY   Marital status:   Occupation:  Stay at [employer] 141 use: Jeny  reports that she has never smoked. She has never used smokeless tobacco.  Alcohol use: Jeny  reports current alcohol use. Drug use: Jeny  reports no history of drug use. Caffeine intake: 0 cups of caffeinated coffee per day(s)  Exercise:  moderately active    /87   Pulse 96   Resp 18   Ht 5' 5\" (1.651 m)   Wt 170 lb 9.6 oz (77.4 kg)   LMP 04/07/2021   BMI 28.39 kg/m²     Physical Exam  Vitals signs reviewed. Constitutional:       Appearance: Normal appearance. She is well-developed. HENT:      Head: Normocephalic and atraumatic. Nose: Nose normal.   Eyes:      Conjunctiva/sclera: Conjunctivae normal.      Right eye: No hemorrhage. Left eye: No hemorrhage. Neck:      Musculoskeletal: Normal range of motion and neck supple. Cardiovascular:      Rate and Rhythm: Normal rate. Pulmonary:      Effort: Pulmonary effort is normal. No respiratory distress. Chest:      Breasts: Breasts are symmetrical.         Right: Mass (2 cm UOQ mass) present. No inverted nipple, nipple discharge, skin change or tenderness. Left: No inverted nipple, mass, nipple discharge, skin change or tenderness. Comments:  Thickening in Uiq - prominent fibroglandular tissue  Fibrocystic changes bilaterally    Abdominal:      Tenderness: There is no abdominal tenderness. Musculoskeletal: Normal range of motion. Comments: Normal Range of motion in upper and lower extremities. Lymphadenopathy:      Cervical: No cervical adenopathy. Right cervical: No superficial, deep or posterior cervical adenopathy. Left cervical: No superficial, deep or posterior cervical adenopathy. Upper Body:      Right upper body: No supraclavicular, axillary or pectoral adenopathy. Left upper body: No supraclavicular, axillary or pectoral adenopathy. Skin:     General: Skin is warm and dry. Findings: No abrasion, bruising, erythema or lesion. Neurological:      Mental Status: She is alert and oriented to person, place, and time. She is not disoriented. Psychiatric:         Speech: Speech normal.         Behavior: Behavior normal. Behavior is cooperative. Thought Content: Thought content normal.         Judgment: Judgment normal.       RADIOGRAPHIC FINDINGS:    Us Breast Limited Left    Result Date: 4/1/2021  BILATERAL DIGITAL DIAGNOSTIC MAMMOGRAMS AND 3D BREAST TOMOSYNTHESIS WITH TARGETED BILATERAL BREAST ULTRASOUNDS: CLINICAL HISTORY: 42YEAR-OLD WITH HISTORY OF BILATERAL BREAST MASSES. SHE WAS GIVEN A DIAGNOSIS OF BILATERAL FIBROADENOMAS. HISTORY OF RECENT HORMONE CHANGES. SHE PRESENTS WITH A NEW PALPABLE LUMP IN THE UPPER OUTER LEFT BREAST. FINDINGS:  2D and 3D breast tomosynthesis diagnostic mammography was performed and compared to previous study from 10/5/2018. There is heterogeneously dense breast parenchyma bilaterally which can obscure small breast lesions. Right breast: There is a new rounded mass in the upper outer right breast at a middle third depth at the approximate 10:00 position. On tomosynthesis views margins are well-circumscribed.  Targeted ultrasound at the 10:00 position 8 cm from the nipple demonstrates a 0.9 x 0.8 x 0.7 cm round hypoechoic solid mass with circumscribed margins which may account for the mammographic finding. No cystic changes or significant internal vascularity. As this mass is new when compared to the previous study, biopsy is recommended. Additionally there is a 6 x 3 x 4 mm solid hypoechoic mass seen on ultrasound at the 10:00 position 4 cm from the nipple. On the radial projection the mass is taller than wide but its margins are relatively well circumscribed. No internal vascularity. Biopsy of this solid mass is also recommended. On the mammographic studies no additional new masses, focal asymmetries or suspicious calcifications in the right breast. Right breast BI-RADS 4: Suspicious. Left breast: BB marker has been placed on a palpable lump in the upper outer left breast. Mammographic images demonstrate a 2.4 cm mass in the upper outer quadrant at a middle to posterior third depth. The mass has increased in size when compared to the previous study. Targeted ultrasound at the 2:00 position 11 cm from the nipple demonstrates a 2.2 x 2.1 x 1.2 cm solid hypoechoic mass corresponding to the mammographic abnormality. Margins are circumscribed. On the radial projection there is a lobular contour extending from the lateral aspect of the lesion. No cystic component. Small amount of internal vascularity. Differential considerations do include atypical fibroadenoma however due to its increase in size and lobular margin biopsy is recommended to exclude malignancy. On the mammographic studies no other new masses, new focal asymmetries or suspicious calcifications in the left breast. Left breast BI-RADS 4: Suspicious Findings were discussed with the patient and she was referred to the nurse imaging navigator to coordinate surgical biopsy. Sanna Silva CAD analysis was performed and used in the interpretation. BI-RADS 4: SUSPICIOUS FINDING--BIOPSY SHOULD BE CONSIDERED. BREAST DENSITY:  HETEROGENEOUS Board Certified Radiologists. Accredited by the ACR and FDA. MAMMOGRAPHY IS VERY IMPORTANT TO YOUR HEALTH. THE AMERICAN CANCER SOCIETY GUIDELINES RECOMMEND THAT WOMEN 36YEARS OF AGE AND OLDER SHOULD HAVE A MAMMOGRAM EVERY YEAR. A REMINDER LETTER WILL BE SENT AT THE APPROPRIATE TIME. Us Breast Limited Right    Result Date: 4/1/2021  BILATERAL DIGITAL DIAGNOSTIC MAMMOGRAMS AND 3D BREAST TOMOSYNTHESIS WITH TARGETED BILATERAL BREAST ULTRASOUNDS: CLINICAL HISTORY: 42YEAR-OLD WITH HISTORY OF BILATERAL BREAST MASSES. SHE WAS GIVEN A DIAGNOSIS OF BILATERAL FIBROADENOMAS. HISTORY OF RECENT HORMONE CHANGES. SHE PRESENTS WITH A NEW PALPABLE LUMP IN THE UPPER OUTER LEFT BREAST. FINDINGS:  2D and 3D breast tomosynthesis diagnostic mammography was performed and compared to previous study from 10/5/2018. There is heterogeneously dense breast parenchyma bilaterally which can obscure small breast lesions. Right breast: There is a new rounded mass in the upper outer right breast at a middle third depth at the approximate 10:00 position. On tomosynthesis views margins are well-circumscribed. Targeted ultrasound at the 10:00 position 8 cm from the nipple demonstrates a 0.9 x 0.8 x 0.7 cm round hypoechoic solid mass with circumscribed margins which may account for the mammographic finding. No cystic changes or significant internal vascularity. As this mass is new when compared to the previous study, biopsy is recommended. Additionally there is a 6 x 3 x 4 mm solid hypoechoic mass seen on ultrasound at the 10:00 position 4 cm from the nipple. On the radial projection the mass is taller than wide but its margins are relatively well circumscribed. No internal vascularity. Biopsy of this solid mass is also recommended. On the mammographic studies no additional new masses, focal asymmetries or suspicious calcifications in the right breast. Right breast BI-RADS 4: Suspicious.  Left breast: BB marker has been placed on a palpable lump in the upper outer left breast. Mammographic images demonstrate a 2.4 cm mass in the upper outer quadrant at a middle to posterior third depth. The mass has increased in size when compared to the previous study. Targeted ultrasound at the 2:00 position 11 cm from the nipple demonstrates a 2.2 x 2.1 x 1.2 cm solid hypoechoic mass corresponding to the mammographic abnormality. Margins are circumscribed. On the radial projection there is a lobular contour extending from the lateral aspect of the lesion. No cystic component. Small amount of internal vascularity. Differential considerations do include atypical fibroadenoma however due to its increase in size and lobular margin biopsy is recommended to exclude malignancy. On the mammographic studies no other new masses, new focal asymmetries or suspicious calcifications in the left breast. Left breast BI-RADS 4: Suspicious Findings were discussed with the patient and she was referred to the nurse imaging navigator to coordinate surgical biopsy. Gema Touch CAD analysis was performed and used in the interpretation. BI-RADS 4: SUSPICIOUS FINDING--BIOPSY SHOULD BE CONSIDERED. BREAST DENSITY:  HETEROGENEOUS Board Certified Radiologists. Accredited by the ACR and FDA. MAMMOGRAPHY IS VERY IMPORTANT TO YOUR HEALTH. THE AMERICAN CANCER SOCIETY GUIDELINES RECOMMEND THAT WOMEN 36YEARS OF AGE AND OLDER SHOULD HAVE A MAMMOGRAM EVERY YEAR. A REMINDER LETTER WILL BE SENT AT THE APPROPRIATE TIME. [facility] Digital Diagnostic Bilateral    Result Date: 4/1/2021  BILATERAL DIGITAL DIAGNOSTIC MAMMOGRAMS AND 3D BREAST TOMOSYNTHESIS WITH TARGETED BILATERAL BREAST ULTRASOUNDS: CLINICAL HISTORY: 42YEAR-OLD WITH HISTORY OF BILATERAL BREAST MASSES. SHE WAS GIVEN A DIAGNOSIS OF BILATERAL FIBROADENOMAS. HISTORY OF RECENT HORMONE CHANGES. SHE PRESENTS WITH A NEW PALPABLE LUMP IN THE UPPER OUTER LEFT BREAST. FINDINGS:  2D and 3D breast tomosynthesis diagnostic mammography was performed and compared to previous study from 10/5/2018.   There is heterogeneously dense breast parenchyma bilaterally which can obscure small breast lesions. Right breast: There is a new rounded mass in the upper outer right breast at a middle third depth at the approximate 10:00 position. On tomosynthesis views margins are well-circumscribed. Targeted ultrasound at the 10:00 position 8 cm from the nipple demonstrates a 0.9 x 0.8 x 0.7 cm round hypoechoic solid mass with circumscribed margins which may account for the mammographic finding. No cystic changes or significant internal vascularity. As this mass is new when compared to the previous study, biopsy is recommended. Additionally there is a 6 x 3 x 4 mm solid hypoechoic mass seen on ultrasound at the 10:00 position 4 cm from the nipple. On the radial projection the mass is taller than wide but its margins are relatively well circumscribed. No internal vascularity. Biopsy of this solid mass is also recommended. On the mammographic studies no additional new masses, focal asymmetries or suspicious calcifications in the right breast. Right breast BI-RADS 4: Suspicious. Left breast: BB marker has been placed on a palpable lump in the upper outer left breast. Mammographic images demonstrate a 2.4 cm mass in the upper outer quadrant at a middle to posterior third depth. The mass has increased in size when compared to the previous study. Targeted ultrasound at the 2:00 position 11 cm from the nipple demonstrates a 2.2 x 2.1 x 1.2 cm solid hypoechoic mass corresponding to the mammographic abnormality. Margins are circumscribed. On the radial projection there is a lobular contour extending from the lateral aspect of the lesion. No cystic component. Small amount of internal vascularity. Differential considerations do include atypical fibroadenoma however due to its increase in size and lobular margin biopsy is recommended to exclude malignancy.  On the mammographic studies no other new masses, new focal asymmetries or suspicious calcifications in the left breast. Left breast BI-RADS 4: Suspicious Findings were discussed with the patient and she was referred to the nurse imaging navigator to coordinate surgical biopsy. Ilana Simon CAD analysis was performed and used in the interpretation. BI-RADS 4: SUSPICIOUS FINDING--BIOPSY SHOULD BE CONSIDERED. BREAST DENSITY:  HETEROGENEOUS Board Certified Radiologists. Accredited by the ACR and FDA. MAMMOGRAPHY IS VERY IMPORTANT TO YOUR HEALTH. THE AMERICAN CANCER SOCIETY GUIDELINES RECOMMEND THAT WOMEN 36YEARS OF AGE AND OLDER SHOULD HAVE A MAMMOGRAM EVERY YEAR. A REMINDER LETTER WILL BE SENT AT THE APPROPRIATE TIME. ASSESSMENT       IMPRESSION :      ICD-10-CM    1. Abnormal mammogram of left breast  R92.8 Surgical Pathology     sodium bicarbonate 8.4 % injection 3 mEq     lidocaine-EPINEPHrine 1 %-1:592323 injection 10 mL   2. Abnormal mammogram of right breast  R92.8 Surgical Pathology     sodium bicarbonate 8.4 % injection 2 mEq     Surgical Pathology     sodium bicarbonate 8.4 % injection 2 mEq     lidocaine-EPINEPHrine 1 %-1:138252 injection 10 mL     lidocaine-EPINEPHrine 1 %-1:880601 injection 10 mL     Cytology, Non-Gyn   3. Breast mass  N63.0    4. Fibrocystic breast changes of both breasts  N60.11     N60.12    5. Overweight (BMI 25.0-29. 9)  E66.3         PLAN:    We discussed the clinical exam and the imaging findings. I recommended an ultrasound guided fine needle aspiration and possible core needle biopsy and possible clip placement x 3. If the lesions are found to be solid or more tissue is needed for diagnosis an ultrasound guided core biopsy will be performed with the need for follow-up versus full removal of the lesion in the Operating Room. She was told that 95% of the time we can obtain a diagnosis. If the results are non-diagnostic she may require a more extensive biopsy.   The patient agreed to undergo the ultrasound guided fine needle aspiration and biopsy in the office setting understanding the risks of bleeding and infection x 3. See ultrasound report. Ultrasound Guided Right Breast Core Needle Biopsy and Clip Placement and Ultrasound Guided Right Breast Fine Needle Aspiration    PATIENT:  LEONIE VIRAMONTES     DATE: 2021    :      1980     INDICATION:  Right Mammogram Abnormality and Ultrasound Abnormality    LOCATION:   10 o'clock 4 cm from nipple    DESCRIPTION:    A timeout was performed immediately prior to the start of the Ultrasound Guided Right Breast Core Needle Biopsy and Clip Placement and Ultrasound Guided Right Breast Fine Needle Aspiration procedure and included the correct patient (two identifiers), correct procedure and correct site(s). Procedure consent and allergies were also verified. The patient understood the risks and benefits of the procedure and consented on the day of her visit. The ultrasound probe was placed over the suspicious lesion. The skin was prepped with chlorhexidine. 13 cc of lidocaine with epi/bicarb mixture was used to anesthetize the skin and breast. A 21 guage FNA was performed and lesion did not collapse. Fluid was sent for cytology. An 11 scalpel was used to make a small quarter inch incision. 3 14-gauge Achieve core needle biopsies were performed, laterally to medially. The samples were placed in formalin. A clip was placed under ultrasound guidance. Hemostasis was obtained. The wound was cleaned. Steri-strips applied. Five minutes of pressure was held. The patient tolerated the procedure well.        IMPRESSION:  Successful Ultrasound Guided Right Breast Core Needle Biopsy and Clip Placement and Ultrasound Guided Right Breast Fine Needle Aspiration    Category 4    Dictated by:  Governor Davidson MD, FACS 2021    Ultrasound Guided Right Breast Core Needle Biopsy and Clip Placement    PATIENT:  Shawn Brooks     DATE: 2021    :      1980     INDICATION:  Right Mammogram Abnormality and Ultrasound Abnormality    LOCATION:   10 o'clock 8 cm from nipple    DESCRIPTION:    A timeout was performed immediately prior to the start of the Ultrasound Guided Right Breast Core Needle Biopsy and Clip Placement procedure and included the correct patient (two identifiers), correct procedure and correct site(s). Procedure consent and allergies were also verified. The patient understood the risks and benefits of the procedure and consented on the day of her visit. The ultrasound probe was placed over the suspicious lesion. The skin was prepped with chlorhexidine. 13 cc of lidocaine with epi/bicarb mixture was used to anesthetize the skin and breast.  An 11 scalpel was used to make a small quarter inch incision. 3 14-gauge Achieve core needle biopsies were performed, laterally to medially. The samples were placed in formalin. A clip was placed under ultrasound guidance. Hemostasis was obtained. The wound was cleaned. Steri-strips applied. Five minutes of pressure was held. The patient tolerated the procedure well. IMPRESSION:  Successful Ultrasound Guided Right Breast Core Needle Biopsy and Clip Placement    Category 4    Dictated by:  Betsey Kaufman MD, FACS 2021    Ultrasound Guided Left Breast Core Needle Biopsy and Clip Placement    PATIENT:  Juana Durant     DATE: 2021    :      1980     INDICATION:  Left Mammogram Abnormality, Ultrasound Abnormality, Breast Mass and Palpable area of Concern    LOCATION:   2 o'clock 8 cm from nipple    DESCRIPTION:    A timeout was performed immediately prior to the start of the Ultrasound Guided Left Breast Core Needle Biopsy and Clip Placement procedure and included the correct patient (two identifiers), correct procedure and correct site(s). Procedure consent and allergies were also verified. The patient understood the risks and benefits of the procedure and consented on the day of her visit.   The ultrasound probe was placed over the suspicious lesion. The skin was prepped with chlorhexidine. 13 cc of lidocaine with epi/bicarb mixture was used to anesthetize the skin and breast.  An 11 scalpel was used to make a small quarter inch incision. 2 14-gauge Achieve core needle biopsies were performed, laterally to medially. The samples were placed in formalin. A clip was placed under ultrasound guidance. Hemostasis was obtained. The wound was cleaned. Steri-strips applied. Five minutes of pressure was held. The patient tolerated the procedure well. IMPRESSION:   Successful Ultrasound Guided Left Breast Core Needle Biopsy and Clip Placement    Category 4    Dictated by:  Anne Bronson MD, FACS 4/12/2021     Total face to face time was 75 minutes with greater than 50% spent on counseling the patient or coordinating her care. Jovanny Gonzalez MD    CC: Cristina Overton MD        The chief complaint, extensive medical and family history, and review of systems were asked and reviewed with the patient by me. I also reviewed the note in detail. This note was partially generated using Dragon voice recognition system, and there may be some incorrect words, spellings, punctuation that were not noticed in checking the note before saving.

## 2021-04-13 ENCOUNTER — TELEPHONE (OUTPATIENT)
Dept: SURGERY | Age: 41
End: 2021-04-13

## 2021-04-13 DIAGNOSIS — R92.8 ABNORMAL MAMMOGRAM OF RIGHT BREAST: ICD-10-CM

## 2021-04-13 DIAGNOSIS — R92.8 ABNORMAL MAMMOGRAM OF LEFT BREAST: ICD-10-CM

## 2021-04-13 NOTE — TELEPHONE ENCOUNTER
I was calling the patient post Right Breast Biopsy x 2, and Left Breast Biopsy. The Patient verbalized a pain level,\"3\", and stated ,\"It's not bad\". The Patient stated that she took two Tylenol yesterday evening and has not had to take anything today for pain. The Patient verbalized that the dressing is dry and intact and that she will remove the ace wrap and dressings this evening . The Patient has no questions or concerns at this time.

## 2021-04-15 ENCOUNTER — VIRTUAL VISIT (OUTPATIENT)
Dept: BEHAVIORAL/MENTAL HEALTH CLINIC | Age: 41
End: 2021-04-15
Payer: COMMERCIAL

## 2021-04-15 ENCOUNTER — TELEPHONE (OUTPATIENT)
Dept: SURGERY | Age: 41
End: 2021-04-15

## 2021-04-15 ENCOUNTER — TELEPHONE (OUTPATIENT)
Dept: PRIMARY CARE CLINIC | Age: 41
End: 2021-04-15

## 2021-04-15 DIAGNOSIS — F39 UNSPECIFIED MOOD (AFFECTIVE) DISORDER (HCC): Primary | ICD-10-CM

## 2021-04-15 DIAGNOSIS — F32.9 MAJOR DEPRESSIVE DISORDER WITH CURRENT ACTIVE EPISODE, UNSPECIFIED DEPRESSION EPISODE SEVERITY, UNSPECIFIED WHETHER RECURRENT: ICD-10-CM

## 2021-04-15 DIAGNOSIS — N64.9 BREAST DISORDER: ICD-10-CM

## 2021-04-15 DIAGNOSIS — R92.8 ABNORMAL MAMMOGRAM OF BOTH BREASTS: Primary | ICD-10-CM

## 2021-04-15 PROCEDURE — 90791 PSYCH DIAGNOSTIC EVALUATION: CPT | Performed by: PSYCHOLOGIST

## 2021-04-15 RX ORDER — ESCITALOPRAM OXALATE 5 MG/1
5 TABLET ORAL DAILY
Qty: 60 TABLET | Refills: 1 | Status: SHIPPED | OUTPATIENT
Start: 2021-04-15 | End: 2022-01-12

## 2021-04-15 RX ORDER — ESCITALOPRAM OXALATE 10 MG/1
5 TABLET ORAL DAILY
Qty: 60 TABLET | Refills: 3 | Status: SHIPPED | OUTPATIENT
Start: 2021-04-15 | End: 2021-04-15 | Stop reason: DRUGHIGH

## 2021-04-15 ASSESSMENT — PATIENT HEALTH QUESTIONNAIRE - PHQ9
5. POOR APPETITE OR OVEREATING: 0
SUM OF ALL RESPONSES TO PHQ QUESTIONS 1-9: 15
9. THOUGHTS THAT YOU WOULD BE BETTER OFF DEAD, OR OF HURTING YOURSELF: 1
SUM OF ALL RESPONSES TO PHQ QUESTIONS 1-9: 15
2. FEELING DOWN, DEPRESSED OR HOPELESS: 1
SUM OF ALL RESPONSES TO PHQ QUESTIONS 1-9: 14
1. LITTLE INTEREST OR PLEASURE IN DOING THINGS: 3
4. FEELING TIRED OR HAVING LITTLE ENERGY: 3
7. TROUBLE CONCENTRATING ON THINGS, SUCH AS READING THE NEWSPAPER OR WATCHING TELEVISION: 3
8. MOVING OR SPEAKING SO SLOWLY THAT OTHER PEOPLE COULD HAVE NOTICED. OR THE OPPOSITE, BEING SO FIGETY OR RESTLESS THAT YOU HAVE BEEN MOVING AROUND A LOT MORE THAN USUAL: 0

## 2021-04-15 NOTE — Clinical Note
Pt reports significant side effects from her medication related to the increased dose but also exacerbated by stopping them abruptly two days ago.  Thanks Ander

## 2021-04-15 NOTE — LETTER
Breast Specialty  [facility] [address]  Tel. [phone]  Fax. [phone]          December 3, 2021        [address] [facility] [address] [facility]    RE:  FOLLOW UP CARE    Dear Dorie Solano:    During your last visit to our office, we recommended a Bilateral Breast Diagnostic Mammogram and Bilateral Breast Ultrasound. Our review of your chart indicates that you have either not obtained the appointment and/or the testing that was ordered. This consultation is a vital part of your care. We highly recommend a follow up with the Breast Surgery Department. Please remember the importance of your health and take time to consult a specialist. If you do not wish to follow through with the consultation, please contact us so we can discuss this matter with you.                     Sincerely,    Leroy Gray MD

## 2021-04-15 NOTE — TELEPHONE ENCOUNTER
I called the patient and notified her that her Right Breast Core Biopsy Results x 2 and Left Breast Core Biopsy pathology results are benign and are concordant with Dr. Anthony Najera findings. The patient verbalized understanding of her test results. The patient educated on the importance of:   1.  6 month Diagnostic Mammogram Bilateral breasts and Bilateral Breast US  2. Annual clinical breast exam by your primary care physician  3. Breast self-awareness  4. An active lifestyle, healthy diet, limited alcohol intake, achieve and maintain a healthy BMI to optimize breast cancer outcomes. 5.Return to Dr. Paige Gordon if you have any new breast shape, nipple discharge, or breast lump. The patient verbalized understanding and has no questions or concerns at this time.

## 2021-04-15 NOTE — TELEPHONE ENCOUNTER
Patient calling in was prescribed generic version of lexapro. And she said she took it day before yesterday, made her nauseated, felt like a mental patient and zapped her brain. Too much for her and knows not supposed to stop immediately. Now cries all the time and her mental state is not good. Not sure if you need to put her on 5 mg or what, but not sure if she needs to be put on something else. She said even the 5 mg before she didn't think helped and she cried all day long. Wanted to talk with you if she could.     Uses [facility] [address]  But insists on talking to you first before you call in something please

## 2021-04-15 NOTE — PROGRESS NOTES
did not detail the content. Pt started her Lexapro medication for depression and anxiety, increased dose to 10mg daily which has created side effects, felt \"incapacitated\", nausea. Pt has since stopped that medication abruptly two days ago. Pt notes difficulty with focus, crying spells, emotionality, lack of motivation that she thinks is related to her medicine    Pt notes a tendency to be very goal driven and identifies as a \"spiritualist\" and has studied marketing, fashion design, bartThermedical school, real estate school, credit repair school, massage therapy- noting a tendency to start programs but not complete them. Pt notes an interest in trading stocks currently. Pt drinks ETOH when she has anxiety stating \"drinking is a crutch\", drinking etoh since age 14 stopped briefly about  60 days ago, Drinking daily about 3 seltzers and a shot. Pt denies other substance use. Pt lives with spouse of 14 yrs and two kids (15, 4) and patient has a 14yo that lives out of the home. Pt is not currently working, had worked in [employer]. Pt is from [place] and notes \"I don't like the cold\". Pt relocated here about 2yrs ago related to his job. Pt has no extended family local. Pt reports no friends in her life, limited use on social media. Pt usually enjoys spirituality but is struggling with this more recently. \"I feel like I'm living a double life\" in regards to a disconnect between  her ambition and accomplishment, frustrated by her emotionality. Pt endorsed passive morbid ideation that occurred once in the past two weeks but specifically states there is no plan, no intent and the Pt feels safe, verbally soren for safety. Pt denies current SI/HI.        O:  MSE:    Appearance    Distracted throughout appt, cooperative, crying,   Appetite normal  Sleep disturbance No  Fatigue Yes  Loss of pleasure Yes  Impulsive behavior Yes  Speech    spontaneous, normal rate and normal volume  Mood    Depressed Irritability  Affect    labile affect  Thought Content    intact  Thought Process    tangential  Associations    logical connections, tangential connections  Insight    Fair  Judgment    Intact  Orientation    oriented to person, place, time, and general circumstances  Memory    recent and remote memory intact  Attention/Concentration    impaired  Morbid ideation Yes  Suicide Assessment    no suicidal ideation      History:    Medications:   Current Outpatient Medications   Medication Sig Dispense Refill    escitalopram (LEXAPRO) 10 MG tablet Take 1 tablet by mouth daily 60 tablet 3    valACYclovir (VALTREX) 1 g tablet 1 g once daily for 5 days when you get an outbreak of herpes 50 tablet 3    triamcinolone (KENALOG) 0.1 % ointment Use twice daily for 2 weeks 1 Tube 1    norethindrone-ethinyl estradiol (LOESTRIN FE 1/20) 1-20 MG-MCG per tablet Take 1 tablet by mouth daily 1 packet 3    amLODIPine (NORVASC) 5 MG tablet Take 1 tablet by mouth daily 90 tablet 5    losartan (COZAAR) 50 MG tablet Take 1 tablet by mouth daily 90 tablet 3     No current facility-administered medications for this visit.         Social History:   Social History     Socioeconomic History    Marital status:      Spouse name: Not on file    Number of children: Not on file    Years of education: Not on file    Highest education level: Not on file   Occupational History    Not on file   Social Needs    Financial resource strain: Not very hard    Food insecurity     Worry: Never true     Inability: Never true    Transportation needs     Medical: No     Non-medical: No   Tobacco Use    Smoking status: Never Smoker    Smokeless tobacco: Never Used   Substance and Sexual Activity    Alcohol use: Yes     Frequency: 2-3 times a week    Drug use: Never    Sexual activity: Yes   Lifestyle    Physical activity     Days per week: Not on file     Minutes per session: Not on file    Stress: Not on file   Relationships    Social connections     Talks on phone: Not on file     Gets together: Not on file     Attends Nondenominational service: Not on file     Active member of club or organization: Not on file     Attends meetings of clubs or organizations: Not on file     Relationship status: Not on file    Intimate partner violence     Fear of current or ex partner: Not on file     Emotionally abused: Not on file     Physically abused: Not on file     Forced sexual activity: Not on file   Other Topics Concern    Not on file   Social History Narrative    Not on file       TOBACCO:   reports that she has never smoked. She has never used smokeless tobacco.  ETOH:   reports current alcohol use. Family History:   Family History   Problem Relation Age of Onset    High Blood Pressure Mother     Diabetes Maternal Uncle     Diabetes Maternal Grandfather     Diabetes Maternal Uncle          A:  Administered the PHQ9 which indicates a self report of moderately severe symptom distress. Pt would benefit from [facility] services to increase coping skills to provide symptom management/control/relief. PHQ Scores 4/15/2021 3/10/2021 2/24/2021 2/25/2020 10/11/2019   PHQ2 Score 4 3 2 0 0   PHQ9 Score 15 9 2 0 0     Interpretation of Total Score Depression Severity: 1-4 = Minimal depression, 5-9 = Mild depression, 10-14 = Moderate depression, 15-19 = Moderately severe depression, 20-27 = Severe depression      Diagnosis:    Unspecified mood disorder  R/O specific depressive disorder, Bipolar disorder, ETOH dx    No past medical history on file. Plan:  Pt interventions:  Established rapport, Conducted functional assessment, [place]-setting to identify pt's primary goals for [facility] visit / overall health, Supportive techniques and Provided [facility]      Pt Behavioral Change Plan:    1. Please follow up with Dr Joo Sy as discussed     2.  Follow up as scheduled to finish establishing care    Please note this report has been partially produced using speech recognition software  And may cause contain errors related to that system including grammar, punctuation and spelling as well as words and phrases that may seem inappropriate. If there are questions or concerns please feel free to contact me to clarify.

## 2021-04-15 NOTE — PATIENT INSTRUCTIONS
1. Please follow up with Dr Eh Riley as discussed     2.  Follow up as scheduled to finish establishing care

## 2021-04-15 NOTE — TELEPHONE ENCOUNTER
Just spoke to pt myself. I have changed Lexapro to 5mg daily. I have also referred her to [facility] because she was unable to secure earlier appt with Dr. Tiffanie Pineda.

## 2021-04-15 NOTE — TELEPHONE ENCOUNTER
----- Message from Luli Delgado MD sent at 4/15/2021 12:52 PM EDT -----  Regarding: call wiht path and needs demetrio dx mamm / us in 6 months - bneign and concordant    ----- Message -----  From: Jaida Ivey Incoming Lab Results From Soft  Sent: 4/14/2021  10:45 AM EDT  To: Luli Delgado MD

## 2021-04-16 ENCOUNTER — TELEPHONE (OUTPATIENT)
Dept: PRIMARY CARE CLINIC | Age: 41
End: 2021-04-16

## 2021-04-16 NOTE — TELEPHONE ENCOUNTER
[facility] with Petrona Steiner called in, states she tried calling [employer] several times and will try reaching out to her throughout the day as well to get ahold of her to get her scheduled for an appointment. She wanted to make you aware.

## 2021-04-19 ENCOUNTER — TELEPHONE (OUTPATIENT)
Dept: PRIMARY CARE CLINIC | Age: 41
End: 2021-04-19

## 2021-05-21 DIAGNOSIS — Z30.011 ENCOUNTER FOR INITIAL PRESCRIPTION OF CONTRACEPTIVE PILLS: ICD-10-CM

## 2021-05-21 NOTE — TELEPHONE ENCOUNTER
patient requesting medication refill. Please approve or deny this request.    Rx requested:  Requested Prescriptions     Pending Prescriptions Disp Refills    norethindrone-ethinyl estradiol (LOESTRIN FE 1/20) 1-20 MG-MCG per tablet 1 packet 3     Sig: Take 1 tablet by mouth daily         Last Office Visit:   4/7/2021      Next Visit Date:  No future appointments.

## 2021-05-22 RX ORDER — NORETHINDRONE ACETATE AND ETHINYL ESTRADIOL 1MG-20(21)
1 KIT ORAL DAILY
Qty: 1 PACKET | Refills: 3 | Status: SHIPPED | OUTPATIENT
Start: 2021-05-22 | End: 2021-09-19

## 2021-09-16 DIAGNOSIS — I10 UNCONTROLLED STAGE 2 HYPERTENSION: ICD-10-CM

## 2021-09-16 RX ORDER — LOSARTAN POTASSIUM 50 MG/1
TABLET ORAL
Qty: 90 TABLET | Refills: 0 | Status: SHIPPED | OUTPATIENT
Start: 2021-09-16

## 2021-09-16 RX ORDER — AMLODIPINE BESYLATE 5 MG/1
TABLET ORAL
Qty: 90 TABLET | Refills: 0 | Status: SHIPPED | OUTPATIENT
Start: 2021-09-16

## 2021-09-19 DIAGNOSIS — Z30.011 ENCOUNTER FOR INITIAL PRESCRIPTION OF CONTRACEPTIVE PILLS: ICD-10-CM

## 2021-09-19 RX ORDER — NORETHINDRONE ACETATE AND ETHINYL ESTRADIOL AND FERROUS FUMARATE 1MG-20(21)
KIT ORAL
Qty: 28 TABLET | Refills: 0 | Status: SHIPPED | OUTPATIENT
Start: 2021-09-19 | End: 2021-11-10 | Stop reason: SDUPTHER

## 2021-11-10 DIAGNOSIS — Z30.011 ENCOUNTER FOR INITIAL PRESCRIPTION OF CONTRACEPTIVE PILLS: ICD-10-CM

## 2021-11-11 RX ORDER — NORETHINDRONE ACETATE AND ETHINYL ESTRADIOL 1MG-20(21)
KIT ORAL
Qty: 28 TABLET | Refills: 0 | Status: SHIPPED | OUTPATIENT
Start: 2021-11-11 | End: 2021-12-23 | Stop reason: SDUPTHER

## 2021-12-22 DIAGNOSIS — Z30.011 ENCOUNTER FOR INITIAL PRESCRIPTION OF CONTRACEPTIVE PILLS: ICD-10-CM

## 2021-12-23 RX ORDER — NORETHINDRONE ACETATE AND ETHINYL ESTRADIOL 1MG-20(21)
KIT ORAL
Qty: 28 TABLET | Refills: 0 | Status: SHIPPED | OUTPATIENT
Start: 2021-12-23 | End: 2022-01-12 | Stop reason: SDUPTHER

## 2022-01-12 ENCOUNTER — OFFICE VISIT (OUTPATIENT)
Dept: PRIMARY CARE CLINIC | Age: 42
End: 2022-01-12
Payer: COMMERCIAL

## 2022-01-12 VITALS
DIASTOLIC BLOOD PRESSURE: 60 MMHG | OXYGEN SATURATION: 98 % | RESPIRATION RATE: 18 BRPM | SYSTOLIC BLOOD PRESSURE: 132 MMHG | WEIGHT: 149 LBS | HEIGHT: 65 IN | HEART RATE: 78 BPM | BODY MASS INDEX: 24.83 KG/M2

## 2022-01-12 DIAGNOSIS — M54.2 CHRONIC NECK PAIN: Primary | ICD-10-CM

## 2022-01-12 DIAGNOSIS — G89.29 CHRONIC NECK PAIN: Primary | ICD-10-CM

## 2022-01-12 DIAGNOSIS — F32.9 MAJOR DEPRESSIVE DISORDER WITH CURRENT ACTIVE EPISODE, UNSPECIFIED DEPRESSION EPISODE SEVERITY, UNSPECIFIED WHETHER RECURRENT: ICD-10-CM

## 2022-01-12 DIAGNOSIS — Z30.011 ENCOUNTER FOR INITIAL PRESCRIPTION OF CONTRACEPTIVE PILLS: ICD-10-CM

## 2022-01-12 DIAGNOSIS — R41.840 ATTENTION DEFICIT: ICD-10-CM

## 2022-01-12 PROCEDURE — G8427 DOCREV CUR MEDS BY ELIG CLIN: HCPCS | Performed by: INTERNAL MEDICINE

## 2022-01-12 PROCEDURE — 1036F TOBACCO NON-USER: CPT | Performed by: INTERNAL MEDICINE

## 2022-01-12 PROCEDURE — G8484 FLU IMMUNIZE NO ADMIN: HCPCS | Performed by: INTERNAL MEDICINE

## 2022-01-12 PROCEDURE — G8420 CALC BMI NORM PARAMETERS: HCPCS | Performed by: INTERNAL MEDICINE

## 2022-01-12 PROCEDURE — 99214 OFFICE O/P EST MOD 30 MIN: CPT | Performed by: INTERNAL MEDICINE

## 2022-01-12 RX ORDER — CYCLOBENZAPRINE HCL 10 MG
10 TABLET ORAL 2 TIMES DAILY PRN
Qty: 30 TABLET | Refills: 1 | Status: SHIPPED | OUTPATIENT
Start: 2022-01-12

## 2022-01-12 RX ORDER — NORETHINDRONE ACETATE AND ETHINYL ESTRADIOL 1MG-20(21)
KIT ORAL
Qty: 28 TABLET | Refills: 5 | Status: SHIPPED | OUTPATIENT
Start: 2022-01-12

## 2022-01-12 RX ORDER — GABAPENTIN 100 MG/1
100 CAPSULE ORAL 2 TIMES DAILY
Qty: 60 CAPSULE | Refills: 1 | Status: SHIPPED | OUTPATIENT
Start: 2022-01-12 | End: 2022-02-11

## 2022-01-12 ASSESSMENT — ENCOUNTER SYMPTOMS
VOMITING: 0
DIARRHEA: 0
COUGH: 0
ABDOMINAL PAIN: 0
NAUSEA: 0
SHORTNESS OF BREATH: 0
SINUS PAIN: 0
RHINORRHEA: 0
WHEEZING: 0

## 2022-01-12 NOTE — PROGRESS NOTES
Subjective:      Patient ID: Katie Sheffield is a 42 y.o. female    Left neck pain x 2 weeks  HPI   Pt presents with 2 weeks of sharp, achy, crampy left neck pain rated 8/10, radiating down the shoulder and arm. Assoc tingling and numbness. Hx chronc cervical spine disorder( s/p epidural injections in [facility]). Mood disorder, not on meds. Denies depression or suicidal ideations. Never made appt with psych per previous recommendation. Still with difficulty with attention and staying focused. Requests OCP refill. History reviewed. No pertinent past medical history. History reviewed. No pertinent surgical history. Social History     Socioeconomic History    Marital status:      Spouse name: Not on file    Number of children: Not on file    Years of education: Not on file    Highest education level: Not on file   Occupational History    Not on file   Tobacco Use    Smoking status: Never Smoker    Smokeless tobacco: Never Used   Vaping Use    Vaping Use: Never used   Substance and Sexual Activity    Alcohol use: Yes    Drug use: Never    Sexual activity: Yes   Other Topics Concern    Not on file   Social History Narrative    Not on file     Social Determinants of Health     Financial Resource Strain: Low Risk     Difficulty of Paying Living Expenses: Not very hard   Food Insecurity: No Food Insecurity    Worried About Running Out of Food in the Last Year: Never true    Vincenzo of Food in the Last Year: Never true   Transportation Needs: No Transportation Needs    Lack of Transportation (Medical): No    Lack of Transportation (Non-Medical):  No   Physical Activity:     Days of Exercise per Week: Not on file    Minutes of Exercise per Session: Not on file   Stress:     Feeling of Stress : Not on file   Social Connections:     Frequency of Communication with Friends and Family: Not on file    Frequency of Social Gatherings with Friends and Family: Not on file    Attends Adventism Services: Not on file    Active Member of Clubs or Organizations: Not on file    Attends Club or Organization Meetings: Not on file    Marital Status: Not on file   Intimate Partner Violence:     Fear of Current or Ex-Partner: Not on file    Emotionally Abused: Not on file    Physically Abused: Not on file    Sexually Abused: Not on file   Housing Stability:     Unable to Pay for Housing in the Last Year: Not on file    Number of [place] in the Last Year: Not on file    Unstable Housing in the Last Year: Not on file     Family History   Problem Relation Age of Onset    High Blood Pressure Mother     Diabetes Maternal Uncle     Diabetes Maternal Grandfather     Diabetes Maternal Uncle      Allergies:  Patient has no known allergies. There is no problem list on file for this patient. Current Outpatient Medications on File Prior to Visit   Medication Sig Dispense Refill    amLODIPine (NORVASC) 5 MG tablet TAKE ONE TABLET BY MOUTH EVERY DAY 90 tablet 0    losartan (COZAAR) 50 MG tablet TAKE ONE TABLET BY MOUTH DAILY 90 tablet 0    valACYclovir (VALTREX) 1 g tablet 1 g once daily for 5 days when you get an outbreak of herpes 50 tablet 3    triamcinolone (KENALOG) 0.1 % ointment Use twice daily for 2 weeks 1 Tube 1     No current facility-administered medications on file prior to visit. Review of Systems   Constitutional: Negative for chills, diaphoresis, fatigue and fever. HENT: Negative for congestion, rhinorrhea and sinus pain. Respiratory: Negative for cough, shortness of breath and wheezing. Cardiovascular: Negative for chest pain. Gastrointestinal: Negative for abdominal pain, diarrhea, nausea and vomiting. Endocrine: Negative for cold intolerance and heat intolerance. Genitourinary: Negative for dysuria and frequency. Musculoskeletal: Positive for arthralgias and neck pain. Neurological: Positive for numbness. Negative for dizziness, weakness and light-headedness. Objective:   /60   Pulse 78   Resp 18   Ht 5' 5\" (1.651 m)   Wt 149 lb (67.6 kg)   LMP 01/05/2022   SpO2 98%   BMI 24.79 kg/m²     Physical Exam  Constitutional:       General: She is not in acute distress. Appearance: She is not diaphoretic. Cardiovascular:      Rate and Rhythm: Normal rate and regular rhythm. Pulses: Normal pulses. Heart sounds: Normal heart sounds, S1 normal and S2 normal.   Pulmonary:      Effort: Pulmonary effort is normal. No respiratory distress. Breath sounds: Normal breath sounds. No wheezing or rales. Chest:      Chest wall: No tenderness. Abdominal:      General: Bowel sounds are normal.      Tenderness: There is no abdominal tenderness. Musculoskeletal:         General: No tenderness. Cervical back: Normal range of motion. No rigidity or tenderness. Neurological:      General: No focal deficit present. Mental Status: She is alert. Cranial Nerves: No cranial nerve deficit. Motor: No weakness. Psychiatric:         Mood and Affect: Mood normal.       Assessment:       Diagnosis Orders   1. Chronic neck pain  XR CERVICAL SPINE (4-5 VIEWS)    gabapentin (NEURONTIN) 100 MG capsule    cyclobenzaprine (FLEXERIL) 10 MG tablet    Jonel Welch MD, Pain Management, [place]   2. Attention deficit  Deb Adamson MD, [facility]   3. Major depressive disorder with current active episode, unspecified depression episode severity, unspecified whether recurrent Stable    4.  Encounter for initial prescription of contraceptive pills  norethindrone-ethinyl estradiol ([facility] FE 1/20) 1-20 MG-MCG per tablet     Plan:      Orders Placed This Encounter   Procedures    XR CERVICAL SPINE (4-5 VIEWS)     Standing Status:   Future     Standing Expiration Date:   1/12/2023    Deb Adamson MD, [facility]     Referral Priority:   Routine Referral Type:   Eval and Treat     Referral Reason:   Specialty Services Required     Referred to Provider:   Erick Godwin     Requested Specialty:   Psychiatry     Number of Visits Requested:   Samantha Pina MD, Pain Management, [place]     Referral Priority:   Routine     Referral Type:   Eval and Treat     Referral Reason:   Specialty Services Required     Referred to Provider:   Lucy Turner MD     Requested Specialty:   Physical Medicine and Rehab     Number of Visits Requested:   1     Orders Placed This Encounter   Medications    gabapentin (NEURONTIN) 100 MG capsule     Sig: Take 1 capsule by mouth 2 times daily for 30 days. Intended supply: 90 days     Dispense:  60 capsule     Refill:  1    cyclobenzaprine (FLEXERIL) 10 MG tablet     Sig: Take 1 tablet by mouth 2 times daily as needed for Muscle spasms     Dispense:  30 tablet     Refill:  1    norethindrone-ethinyl estradiol (JUNEL FE 1/20) 1-20 MG-MCG per tablet     Sig: TAKE ONE TABLET BY MOUTH DAILY     Dispense:  28 tablet     Refill:  5     Return in about 1 month (around 2/12/2022) for follow up, with PCP.

## 2022-01-13 ENCOUNTER — HOSPITAL ENCOUNTER (OUTPATIENT)
Dept: GENERAL RADIOLOGY | Age: 42
Discharge: HOME | End: 2022-01-15
Payer: COMMERCIAL

## 2022-01-13 DIAGNOSIS — G89.29 CHRONIC NECK PAIN: ICD-10-CM

## 2022-01-13 DIAGNOSIS — M54.2 CHRONIC NECK PAIN: ICD-10-CM

## 2022-01-13 PROCEDURE — 72050 X-RAY EXAM NECK SPINE 4/5VWS: CPT

## 2022-01-14 ENCOUNTER — TELEPHONE (OUTPATIENT)
Dept: PRIMARY CARE CLINIC | Age: 42
End: 2022-01-14

## 2022-01-18 ENCOUNTER — INITIAL CONSULT (OUTPATIENT)
Dept: PAIN MANAGEMENT | Age: 42
End: 2022-01-18
Payer: COMMERCIAL

## 2022-01-18 VITALS
TEMPERATURE: 97.9 F | WEIGHT: 167 LBS | BODY MASS INDEX: 27.82 KG/M2 | DIASTOLIC BLOOD PRESSURE: 62 MMHG | SYSTOLIC BLOOD PRESSURE: 130 MMHG | HEIGHT: 65 IN

## 2022-01-18 DIAGNOSIS — M54.2 NECK PAIN: ICD-10-CM

## 2022-01-18 DIAGNOSIS — M79.18 MYOFASCIAL PAIN ON LEFT SIDE: ICD-10-CM

## 2022-01-18 DIAGNOSIS — M54.2 TRIGGER POINT OF NECK: Primary | ICD-10-CM

## 2022-01-18 PROCEDURE — 99204 OFFICE O/P NEW MOD 45 MIN: CPT | Performed by: PAIN MEDICINE

## 2022-01-18 PROCEDURE — G8484 FLU IMMUNIZE NO ADMIN: HCPCS | Performed by: PAIN MEDICINE

## 2022-01-18 PROCEDURE — G8427 DOCREV CUR MEDS BY ELIG CLIN: HCPCS | Performed by: PAIN MEDICINE

## 2022-01-18 PROCEDURE — G8419 CALC BMI OUT NRM PARAM NOF/U: HCPCS | Performed by: PAIN MEDICINE

## 2022-01-18 PROCEDURE — 1036F TOBACCO NON-USER: CPT | Performed by: PAIN MEDICINE

## 2022-01-18 ASSESSMENT — ENCOUNTER SYMPTOMS
DIARRHEA: 0
BACK PAIN: 0
CONSTIPATION: 0
NAUSEA: 0
SHORTNESS OF BREATH: 0

## 2022-01-18 NOTE — PROGRESS NOTES
[facility]) Physicians  Neurosurgery and Pain [facility]  [address], [address] [facility] , [address] [facility], [place]: [phone]  F: [phone]        Daniel Holbrook  (1980)    1/18/2022    Subjective:     Daniel Holbrook is 36 y.o. female who complains today of:    Chief Complaint   Patient presents with    Neck Pain    Shoulder Pain     Left shoulder and arm        HPI    Patient complains of neck pain, left. Starter a few weeks ago after working out. Thinks she over did it. Was doing lat pull downs. Felt a little pressure in the neck, and became worse in the morning, and started to creep up on her. Sees chiropractor. Had an adjustment . IT became much worse after that. Pain is described as aching, sharp, dull and tingling. It starts in the neck and goes into lateral shoulder. She has some tingling into the forearm and travels to the thumb. Pain level today is rated 7 on a 10 point scale. It is severe in nature. With pain medication (Ibuprofen), pain level drops to a 7/10. Without pain medication, pain level can escalate upto a 10/10. Pain is affecting the patients ability to perform activities of daily living especially with regards to personal hygiene, household chores and self care. With pain medication, the patient is better able to perform ADLs. Pain in part is secondary to osteoarthritis of the spine. Pain is aggravated by Turning neck, bending, lifting, twisting, walking and standing  Pain is alleviated by rest and medication. Prior PT: Yes, in the past for a similar exacerbation a few years ago. Prior Injections: Yes, she has had injection in the past.   Prior medications: NSAIDs and muscle relaxants  Prior spine surgeries:  none  Pain is not is associated with fevers/chills/night sweats. Bowel and bladder are working appropriately. Previous studies include Xray. Allergies:  Patient has no known allergies. No past medical history on file.   No past surgical history on file. Family History   Problem Relation Age of Onset    High Blood Pressure Mother     Diabetes Maternal Uncle     Diabetes Maternal Grandfather     Diabetes Maternal Uncle      Social History     Socioeconomic History    Marital status:      Spouse name: Not on file    Number of children: Not on file    Years of education: Not on file    Highest education level: Not on file   Occupational History    Not on file   Tobacco Use    Smoking status: Never Smoker    Smokeless tobacco: Never Used   Vaping Use    Vaping Use: Never used   Substance and Sexual Activity    Alcohol use: Yes    Drug use: Never    Sexual activity: Yes   Other Topics Concern    Not on file   Social History Narrative    Not on file     Social Determinants of Health     Financial Resource Strain: Low Risk     Difficulty of Paying Living Expenses: Not very hard   Food Insecurity: No Food Insecurity    Worried About Running Out of Food in the Last Year: Never true    Vincenzo of Food in the Last Year: Never true   Transportation Needs: No Transportation Needs    Lack of Transportation (Medical): No    Lack of Transportation (Non-Medical):  No   Physical Activity:     Days of Exercise per Week: Not on file    Minutes of Exercise per Session: Not on file   Stress:     Feeling of Stress : Not on file   Social Connections:     Frequency of Communication with Friends and Family: Not on file    Frequency of Social Gatherings with Friends and Family: Not on file    Attends Gnosticism Services: Not on file    Active Member of Clubs or Organizations: Not on file    Attends Club or Organization Meetings: Not on file    Marital Status: Not on file   Intimate Partner Violence:     Fear of Current or Ex-Partner: Not on file    Emotionally Abused: Not on file    Physically Abused: Not on file    Sexually Abused: Not on file   Housing Stability:     Unable to Pay for Housing in the Last Year: Not on file    Number of Places Lived in the Last Year: Not on file    Unstable Housing in the Last Year: Not on file       Current Outpatient Medications on File Prior to Visit   Medication Sig Dispense Refill    gabapentin (NEURONTIN) 100 MG capsule Take 1 capsule by mouth 2 times daily for 30 days. Intended supply: 90 days 60 capsule 1    cyclobenzaprine (FLEXERIL) 10 MG tablet Take 1 tablet by mouth 2 times daily as needed for Muscle spasms 30 tablet 1    norethindrone-ethinyl estradiol (JUNEL FE 1/20) 1-20 MG-MCG per tablet TAKE ONE TABLET BY MOUTH DAILY 28 tablet 5    amLODIPine (NORVASC) 5 MG tablet TAKE ONE TABLET BY MOUTH EVERY DAY 90 tablet 0    losartan (COZAAR) 50 MG tablet TAKE ONE TABLET BY MOUTH DAILY 90 tablet 0    valACYclovir (VALTREX) 1 g tablet 1 g once daily for 5 days when you get an outbreak of herpes 50 tablet 3    triamcinolone (KENALOG) 0.1 % ointment Use twice daily for 2 weeks 1 Tube 1     No current facility-administered medications on file prior to visit. Review of Systems   Constitutional: Negative for fever. HENT: Negative for hearing loss. Respiratory: Negative for shortness of breath. Gastrointestinal: Negative for constipation, diarrhea and nausea. Genitourinary: Negative for difficulty urinating. Musculoskeletal: Negative for back pain and neck pain. Skin: Negative for rash. Neurological: Negative for headaches. Hematological: Does not bruise/bleed easily. Psychiatric/Behavioral: Negative for sleep disturbance. Objective:     Vitals:  /62   Temp 97.9 °F (36.6 °C)   Ht 5' 5\" (1.651 m)   Wt 167 lb (75.8 kg)   LMP 01/05/2022   BMI 27.79 kg/m² Pain Score:   7      Physical Exam    General Appearance: NAD and Conversant. Eyes: EOM intact. HENT: Atraumatic. Neck: Neck is supple and Trachea midline. Lymph: No supraclavicular lymphadenopathy. Lungs: Normal respiratory effort and No significant respiratory distress.   Cardiovascular: No lower extremity ulcerations or cyanosis and Capillary refill is less than 2 seconds. Abdomen: Soft and Non tender to palpation. Extremeties: No significant lower exremity edema. Skin: Visualized skin is intact and she has normal skin turgor. Psych: Mood and affect within normal limits, Insight and judgement within normal limits and Alert and oriented. Inspection of the spine reveals no gross abnormalities in terms of curvature. ROM of the spine is abnormal.  There is pain inhibition with end ranges. Facet loading reproduces her symptoms. Palpation: she hasTTP over the left cervical paraspinals, trapezius. paraspinal musculature. Sacroiliac joints are non TTP. Leeanne maneuver reproduces symptoms. Muscle Tone is within normal limits in all four extremities. Strength: 5 in right upper extremity extremity. 5 in left upper extremity extremity. 5 in right lower extremity extremity. 5 in left lower extremity extremity. Neurologic:  Coordination is within normal limits. DTR's are intact and symmetric throughout. Sensation is within normal limits throughout in all four extremities to deep pressure and light touch. Gait is within normal limits. No difficulty with heel walking, toe walking and tandem walking. Long Tract Signs: SLR: Negative. Spurling's Maneuver: Negative. Plantar Response: Downgoing bilaterally. Keith sign is negative bilaterally    DATA REVIEW:   XR C SPINE 1/13/22:    EXAMINATION:  CERVICAL SPINE       CLINICAL HISTORY: Injured while exercising       COMPARISON: NONE       Six views of the cervical spine are submitted. There is mild straightening of the normal expected cervical lordosis.  Prevertebral soft tissues are unremarkable.  Disk spaces are preserved.  No acute fracture.  No spondylolisthesis.   Neural foramina are grossly patent.           Impression       NO ACUTE FRACTURE             Assessment:      Diagnosis Orders   1.  Trigger point of neck  CO INJECT TRIGGER POINTS, 3 OR GREATER   2. Myofascial pain on left side  KY INJECT TRIGGER POINTS, 3 OR GREATER   3. Neck pain  KY INJECT TRIGGER POINTS, 3 OR GREATER       Plan:          No orders of the defined types were placed in this encounter. Orders Placed This Encounter   Procedures    KY INJECT TRIGGER POINTS, 3 OR GREATER     Standing Status:   Future     Standing Expiration Date:   4/18/2022     1. Trial of Physical Therapy. All recommendations for therapy are provided to improve function with activities of daily living, decrease pain, and help develop a home exercise program.    2. Trial of trigger point injections. Discussed the risks including but not limited to bleeding, infection, worsened pain, damage to surrounding structures, side effects, toxicity, allergic reactions to medications used, need for surgery, premature damage or degeneration of the joint, as well as catastrophic injury such as vision loss, paralysis, stroke, bowel or bladder incontinence, ventilator dependence, loss of use of the joint and/or extremity, and death. Discussed the risks, benefits, and alternatives to the procedure including no procedure at all. Discussed that we cannot undo any permanent neurologic or orthopaedic damage or change the course of any underlying disease. After thorough discussion, patient expressed understanding and willingness to proceed. Follow up:  Return in about 2 months (around 3/18/2022) for P.T. Internal Referral.  The patient will follow up for reevaluation of her pain. The patient is aware of the treatment plan and in agreement. All of her questions were answered.      Nohemi Loyd MD

## 2022-01-18 NOTE — PROGRESS NOTES
[facility]) Physicians  Neurosurgery and Pain [facility]  [address] [facility], [address] [facility] , [address], [place]: [phone]  F: [phone]           Patient Name: Teresa German   : 1980  Date: 2022     Provider: Alysia Turner MD        PROCEDURE: Trigger point injections into the Left trapezius and cervical paraspinals without image guidance    INDICATIONS: Teresa German is a 36 y.o. female who presents with symptoms and physical exam findings consistent with myofascial pain. She has had persistent pain that limits her activities of daily living. The pain is persistent despite conservative measures. Given her symptoms, physical exam findings, impairment in activities of daily living, and lack of response to conservative measures, consideration for trigger point injections was given. Discussed the risks including but not limited to bleeding, infection, worsened pain, damage to surrounding structures, side effects, toxicity, allergic reactions to medications used, need for surgery, pneumothorax, abdominal and visceral injury, as well as catastrophic injury such as vision loss, paralysis, spinal cord or plexus injury, stroke, bowel or bladder incontinence, chest tube insertion, ventilator dependence, and death. Discussed the risks, benefits, and alternatives to the procedure including no procedure at all. Discussed that we cannot undo any permanent neurologic or orthopaedic damage or change the course of any underlying disease. After thorough discussion, patient expressed understanding and willingness to proceed. Written consent was obtained and is in the chart. Verbal consent to proceed was obtained. Description of Procedure: The sites were marked. A time-out was performed. The patient was positioned comfortably in a seated position. The most symptomatic trigger points were identified for a total of 4 trigger points.   The sites were prepped in a sterile manner with Betadine three times and alcohol. Then a 27-gauge 1.5 inch needle was advanced to the trigger points in a sequential manner. Care was taken to enter the trigger points without injuring adjacent tissue. Patient was breathing comfortably throughout the entire procedure without any discomfort or change in respiratory status. Each trigger point was sequentially entered. Aspiration for blood or other fluids were negative. Then a 2 mL injectate containing 0.5 mL of 3 mg of betamethasone and 1.5 mL of 1.0 % preservative-free lidocaine was injected and divided equally between the trigger points with 0.5 mL being delivered into each trigger point. At each trigger point aspiration was negative. A single needle was used. Each site was hemostatic. Each site was cleaned and appropriately dressed. The patient tolerated the procedure well. There were no immediate post procedure complications. Post procedure instructions were given. The patient was monitored for a short period of time and discharged home with her baseline neurologic and orthopaedic exam. The patient will follow-up as previously instructed.

## 2022-01-24 ENCOUNTER — HOSPITAL ENCOUNTER (OUTPATIENT)
Dept: PHYSICAL THERAPY | Age: 42
Setting detail: THERAPIES SERIES
Discharge: HOME | End: 2022-01-24
Payer: COMMERCIAL

## 2022-01-24 PROCEDURE — 97162 PT EVAL MOD COMPLEX 30 MIN: CPT

## 2022-01-24 ASSESSMENT — PAIN DESCRIPTION - LOCATION: LOCATION: NECK

## 2022-01-24 ASSESSMENT — PAIN DESCRIPTION - DIRECTION: RADIATING_TOWARDS: UT

## 2022-01-24 ASSESSMENT — PAIN DESCRIPTION - FREQUENCY: FREQUENCY: CONTINUOUS

## 2022-01-24 ASSESSMENT — PAIN DESCRIPTION - ONSET: ONSET: AWAKENED FROM SLEEP

## 2022-01-24 ASSESSMENT — PAIN DESCRIPTION - PROGRESSION: CLINICAL_PROGRESSION: GRADUALLY IMPROVING

## 2022-01-24 ASSESSMENT — PAIN DESCRIPTION - ORIENTATION: ORIENTATION: LEFT

## 2022-01-24 ASSESSMENT — PAIN SCALES - GENERAL: PAINLEVEL_OUTOF10: 5

## 2022-01-24 ASSESSMENT — PAIN DESCRIPTION - DESCRIPTORS: DESCRIPTORS: TIGHTNESS

## 2022-01-24 NOTE — PROGRESS NOTES
[facility]) Physical Therapy-  [place]  PHYSICAL THERAPY EVALUATION    Date: 2022  Patient Name: Dorie Solano       MRN: [id number]   Account: [de-identified]   : 1980  (36 y.o.)   Gender: female   Referring Practitioner: Dr Conner Callaway                 Diagnosis: Trigger point of neck and Myofascial pain L  Treatment Diagnosis: Cervical and  L UT pain           Past Medical History:  has no past medical history on file. Past Surgical History:   has no past surgical history on file. Vital Signs  Patient Currently in Pain: Yes   Pain Screening  Patient Currently in Pain: Yes  Pain Assessment  Pain Assessment: 0-10  Pain Level: 5 (highest 9-10/10 last couple weeks.)  Pain Location: Neck  Pain Orientation: Left  Pain Radiating Towards: UT  Pain Descriptors: Tightness  Pain Frequency: Continuous  Pain Onset: Awakened from sleep  Clinical Progression: Gradually improving  Non-Pharmaceutical Pain Intervention(s):  (Has TENS unit)           ADL Assistance: Independent  Homemaking Assistance: Independent  Homemaking Responsibilities: Yes  Ambulation Assistance: Independent  Transfer Assistance: Independent  Active : Yes  Mode of Transportation: Car  Occupation: Unemployed  Type of occupation: \"need a job\"  Leisure & Hobbies: Work out,        Subjective:  Subjective: Pt reports one month ago was working out with an increased load and noticed increased L UT pain. Has gabopentin and muscle relaxer which is helping. Pt reports L UT spasms \"all the time\", tingling in neck and intermittent numbness to thumb initially however travels to L shoulder at this time without numbness.   Comments: RTD 3/15/22    Objective:         Strength RUE  Comment: 3/5 Shoulder, elbow 4-/5, wrist 3+/5  Strength LUE  Comment: 3 to 3+/5 shoulder, elbow 4-/5, wrist 3+/5  Strength Other  Other: Scap LT/MT 4-/5,  45# L, 42# R, (R handed)        AROM LLE (degrees)  LLE AROM : WNL        AROM RUE (degrees)  RUE AROM : WNL Spine  Cervical: Flex 40 pain, Ext 60 pain, SB 40R, 50L, Rotation B 60 with a strain but no pain  Special Tests: compression(no change), distraction(decrease pain), spurlings(+ B), transve sharp-gregory(-), Modified DeKlyn's(-),    Observation/Palpation  Palpation: L>R UT tightness and cervical tightness with tenderness on L  Observation: fwd head, protracted scap       Exercises:   Exercises  Exercise 1: UT stretch*, levator stretch*  Exercise 2: scap retraction*  Exercise 3: chest pulls*  Exercise 4: midrow, lat pull*  Exercise 5: prone scap*  Exercise 6: bicep/tricep band*  Exercise 20: HEP: UT stretch, scap retraction, use of heat  Modalities:  Modalities  Moist heat: *  Ultrasound: L UT and cervical paraspinal*  Manual:  Manual therapy  Manual traction: *  Soft Tissue Mobalization: TPR L UT*  Other: cupping or IDN L UT/cervical parapspinal*  *Indicates exercise,modality, or manual techniques to be initiated when appropriate  Assessment: Body structures, Functions, Activity limitations: Decreased functional mobility ,Decreased ADL status,Decreased ROM,Decreased strength,Increased pain,Decreased posture,Decreased balance  Assessment: The pt's impairments currently limit functional abilities by 30% including her abilities to  reach and lift, reading,  perform recreational actvities, and perform household/work related duties without pain or limitations. Skilled PT required to address about deficits to improve over function and return to prior level of function.   Prognosis: Good  Discharge Recommendations: Continue to assess pending progress        Decision Making: Medium Complexity  History: MED  Exam: NDI 15/50=70% functional  Clinical Presentation: evolving      Plan  Frequency/Duration:  Plan  Times per week: 2  Plan weeks: 4-6  Current Treatment Recommendations: Strengthening,ROM,Home Exercise Program,Integrated Dry Needling,Neuromuscular Re-education,Manual Therapy - Soft Tissue Mobilization,Modalities Patient Education  New Education Provided: PT Education: Goals;PT Role;Plan of Care  Patient Education: scap retraction, UT stretch, heat use and posture awareness. POST-PAIN     Pain Rating (0-10 pain scale): No change/10  Location and pain description same as pre-treatment unless indicated. Action: [x] NA  [] Call Physician  [] Perform HEP  [] Meds as prescribed    Evaluation and patient rights have been reviewed and patient agrees with plan of care. Yes  [x]  No  []   Explain:       [place]ard Fall Risk Assessment  Risk Factor Scale  Score   History of Falls [] Yes  [x] No 25  0 0   Secondary Diagnosis [] Yes  [x] No 15  0 0   Ambulatory Aid [] Furniture  [] Crutches/cane/walker  [x] None/bedrest/wheelchair/nurse 30  15  0 0   IV/Heparin Lock [] Yes  [x] No 20  0 0   Gait/Transferring [] Impaired  [x] Weak  [] Normal/bedrest/immobile 20  10  0 0   Mental Status [] Forgets limitations  [x] Oriented to own ability 15  0 0      Total:0     Based on the Assessment score: check the appropriate box. [x]  No intervention needed   Low =   Score of 0-24  []  Use standard prevention interventions Moderate =  Score of 24-44   [] Discuss fall prevention strategies   [] Indicate moderate falls risk on eval  []  Use high risk prevention interventions High = Score of 45 and higher   [] Discuss fall prevention strategies   [] Provide supervision during treatment time    Goals  Short term goals  Time Frame for Short term goals: 2 weeks  Short term goal 1: The pt will demonstrate improved postural awareness requiring <25% VC's throughout treatment  Short term goal 2: Decrease cervical pain 50% to assist with improved functional gains.   Long term goals  Time Frame for Long term goals : 4-6 weeks  Long term goal 1: Indep HEP for symptom management  Long term goal 2: Pt demo improved overall function by reporting greater than 80% per functional survey score  Long term goal 3: Pain-free cervical AROM symmetrical and  WNL allowing an increase in ADL tolerance without compensatory mvmts. Long term goal 4: Improve B shoulder/scap strength 4-/5 to 4/5 to  allow patient to return to normal work out routine.          PT Individual Minutes  Time In: 1005  Time Out: 1043  Minutes: 38     Procedure Minutes:Eval 35 min     Electronically signed by Nora Brumfield PT on 1/24/22 at 3:33 PM EST

## 2022-01-24 NOTE — PROGRESS NOTES
[address] [place] [address]  [address]  ISNBZ:[phone]    [] Certification  [] Recertification [x]  Plan of Care  [] Progress Note [] Discharge      To:  Dr Dave Avendaño      From:  Cheko Mckeon, PT  Patient: Sadaf Link     : 1980  Diagnosis: Trigger point of neck and Myofascial pain L     Date: 2022  Treatment Diagnosis: Cervical and  L UT painProgress Report Period from:  2022  to 2022    Total # of Visits to Date: 1   No Show: 0    Canceled Appointment: 0     OBJECTIVE:   Short Term Goals - Time Frame for Short term goals: 2 weeks    Goals Current/Discharge status  Met   Short term goal 1: The pt will demonstrate improved postural awareness requiring <25% VC's throughout treatment  Observation: fwd head, protracted scap  [] yes  [x] no   Short term goal 2: Decrease cervical pain 50% to assist with improved functional gains. Pain Location: Neck    Pain Level: 5 (highest 9-10/10 last couple weeks.)    Pain Descriptors: Tightness [] yes  [x] no     Long Term Goals - Time Frame for Long term goals : 4-6 weeks  Goals Current/ Discharge status Met   Long term goal 1: Indep HEP for symptom management Written HEP initiated  for symptom management  Needs progression for comprehensive program development. [] yes  [x] no   Long term goal 2: Pt demo improved overall function by reporting greater than 80% per functional survey score Exam: NDI 15/50=70% functional Exam: NDI 15/50=70% functional     [] yes  [x] no   Long term goal 3: Pain-free cervical AROM symmetrical and  WNL allowing an increase in ADL tolerance without compensatory mvmts. Cervical: Flex 40 pain, Ext 60 pain, SB 40R, 50L, Rotation B 60 with a strain but no pain    [] yes  [x] no   Long term goal 4: Improve B shoulder/scap strength 4-/5 to 4/5 to  allow patient to return to normal work out routine.   Strength RUE  Comment: 3/5 Shoulder, elbow 4-/5, wrist 3+/5  Strength LUE  Comment: 3 to 3+/5 shoulder, elbow 4-/5, wrist 3+/5  Strength Other  Other: Scap LT/MT 4-/5,  45# L, 42# R, (R handed)   [] yes  [x] no       Body structures, Functions, Activity limitations: Decreased functional mobility ,Decreased ADL status,Decreased ROM,Decreased strength,Increased pain,Decreased posture,Decreased balance  Assessment: The pt's impairments currently limit functional abilities by 30% including her abilities to  reach and lift, reading,  perform recreational actvities, and perform household/work related duties without pain or limitations. Skilled PT required to address about deficits to improve over function and return to prior level of function. Prognosis: Good  Discharge Recommendations: Continue to assess pending progress    Special Tests: compression(no change), distraction(decrease pain), spurlings(+ B), transve sharp-gregory(-), Modified DeKlyn's(-),       PT Education: Goals;PT Role;Plan of Care  Patient Education: scap retraction, UT stretch, heat use and posture awareness. PLAN: [x] Evaluate and Treat  Frequency/Duration:  Plan  Times per week: 2  Plan weeks: 4-6  Current Treatment Recommendations: Strengthening,ROM,Home Exercise Program,Integrated Dry Needling,Neuromuscular Re-education,Manual Therapy - Soft Tissue Mobilization,Modalities                             Patient Status:[x] Continue/ Initiate plan of Care    [] Discharge PT. Recommend pt continue with HEP. [] Additional visits requested, Please re-certify for additional visits:          Signature: Electronically signed by Clarence Cabrera PT on 1/24/22 at 3:23 PM EST      If you have any questions or concerns, please don't hesitate to call. Thank you for your referral.    I have reviewed this plan of care and certify a need for medically necessary rehabilitation services.     Physician Signature:__________________________________________________________  Date:  Please sign and return

## 2022-01-27 ENCOUNTER — HOSPITAL ENCOUNTER (OUTPATIENT)
Dept: PHYSICAL THERAPY | Age: 42
Setting detail: THERAPIES SERIES
Discharge: HOME | End: 2022-01-27
Payer: COMMERCIAL

## 2022-01-27 ENCOUNTER — APPOINTMENT (OUTPATIENT)
Dept: PHYSICAL THERAPY | Age: 42
End: 2022-01-27
Payer: COMMERCIAL

## 2022-01-27 PROCEDURE — 97110 THERAPEUTIC EXERCISES: CPT

## 2022-01-27 PROCEDURE — 97140 MANUAL THERAPY 1/> REGIONS: CPT

## 2022-01-27 ASSESSMENT — PAIN DESCRIPTION - DESCRIPTORS: DESCRIPTORS: TIGHTNESS

## 2022-01-27 ASSESSMENT — PAIN DESCRIPTION - PROGRESSION: CLINICAL_PROGRESSION: GRADUALLY IMPROVING

## 2022-01-27 ASSESSMENT — PAIN DESCRIPTION - ORIENTATION: ORIENTATION: LEFT

## 2022-01-27 ASSESSMENT — PAIN DESCRIPTION - LOCATION: LOCATION: NECK

## 2022-01-27 ASSESSMENT — PAIN SCALES - GENERAL: PAINLEVEL_OUTOF10: 4

## 2022-01-27 ASSESSMENT — PAIN DESCRIPTION - FREQUENCY: FREQUENCY: CONTINUOUS

## 2022-01-27 NOTE — PROGRESS NOTES
[address] [address]  [address]:[phone]        Date: 2022  Patient: Eliseo Rincon  : 1980  ACCT #: [de-identified]  Referring Practitioner: Dr Ze Pablo  Diagnosis: Trigger point of neck and Myofascial pain L  Treatment Diagnosis: Cervical and  L UT pain    Visit Information:  PT Visit Information  Onset Date: 22  PT Insurance Information: [employer]  Total # of Visits Approved:  (48 units 22 to 3/11/22)  Total # of Visits to Date: 2  No Show: 0  Canceled Appointment: 0  Progress Note Counter:  (3/48 units until 3/11/22)    Subjective: \"Same old, same old. I keep getting these spasms. \" Pt reports 'spasm' starts in the UT and stops at the top of the L shoulder/deltoid area. Comments: RTD 3/15/22  HEP Compliance:  [] Good [] Fair [] Poor [] Reports not doing due to:    Vital Signs  Patient Currently in Pain: Yes   Pain Screening  Patient Currently in Pain: Yes  Pain Assessment  Pain Assessment: 0-10  Pain Level: 4  Pain Location: Neck  Pain Orientation: Left  Pain Descriptors: Tightness  Pain Frequency: Continuous  Clinical Progression: Gradually improving    OBJECTIVE:   Exercises  Exercise 1: UT stretch, levator stretch 3 x 10 seconds  Exercise 2: scap retraction 10 x 5 seconds  Exercise 3: chest pulls*  Exercise 4: midrow x 10 (slight pain in L  Exercise 5: prone scap*  Exercise 6: bicep/tricep band*  Exercise 20: HEP: UT stretch, scap retraction, use of heat    Strength: [x] NT  [] MMT completed:     ROM: [x] NT  [] ROM measurements:  Manual:   Manual therapy  Manual traction: *  Soft Tissue Mobalization: TPR L UT x 15 minutes  Other: cupping or IDN L UT/cervical parapspinal*        *Indicates exercise, modality, or manual techniques to be initiated when appropriate    Assessment:         Body structures, Functions, Activity limitations: Decreased functional mobility ,Decreased ADL status,Decreased ROM,Decreased strength,Increased pain,Decreased posture,Decreased balance  Assessment: Initiate patient POC this date progressing patient through gentle cervical ROM and strengthening exercises. Exercises included UT/levator stretches, scap retractions, and  tband rows. Performed STM on L UT to decrease spasms and pain. Treatment Diagnosis: Cervical and  L UT pain  Prognosis: Good       Goals:  Short term goals  Time Frame for Short term goals: 2 weeks  Short term goal 1: The pt will demonstrate improved postural awareness requiring <25% VC's throughout treatment  Short term goal 2: Decrease cervical pain 50% to assist with improved functional gains. Long term goals  Time Frame for Long term goals : 4-6 weeks  Long term goal 1: Indep HEP for symptom management  Long term goal 2: Pt demo improved overall function by reporting greater than 80% per functional survey score  Long term goal 3: Pain-free cervical AROM symmetrical and  WNL allowing an increase in ADL tolerance without compensatory mvmts. Long term goal 4: Improve B shoulder/scap strength 4-/5 to 4/5 to  allow patient to return to normal work out routine. Progress toward goals:  Initiated POC this date to progress towards goals. POST-PAIN       Pain Rating (0-10 pain scale):  3 /10   Location and pain description same as pre-treatment unless indicated. Action: [x] NA   [] Perform HEP  [] Meds as prescribed  [] Modalities as prescribed   [] Call Physician     Frequency/Duration:  Plan  Times per week: 2  Plan weeks: 4-6  Current Treatment Recommendations: Strengthening,ROM,Home Exercise Program,Integrated Dry Needling,Neuromuscular Re-education,Manual Therapy - Soft Tissue Mobilization,Modalities     Pt to continue current HEP. See objective section for any therapeutic exercise changes, additions or modifications this date.      PT Individual Minutes  Time In: 1435  Time Out: 2365  Minutes: 40  Timed Code Treatment Minutes: 40 Minutes  Procedure Minutes:0 Timed Activity Minutes Units   Ther Ex 25 2   Manual  15 1       Signature:  Electronically signed by Tiffani Hdz PTA on 1/27/22 at 3:37 PM EST

## 2022-01-31 ENCOUNTER — HOSPITAL ENCOUNTER (OUTPATIENT)
Dept: ULTRASOUND IMAGING | Age: 42
Discharge: HOME | End: 2022-02-02
Payer: COMMERCIAL

## 2022-01-31 ENCOUNTER — HOSPITAL ENCOUNTER (OUTPATIENT)
Dept: WOMENS IMAGING | Age: 42
Discharge: HOME | End: 2022-02-02
Payer: COMMERCIAL

## 2022-01-31 ENCOUNTER — HOSPITAL ENCOUNTER (OUTPATIENT)
Dept: PHYSICAL THERAPY | Age: 42
Setting detail: THERAPIES SERIES
Discharge: HOME | End: 2022-01-31
Payer: COMMERCIAL

## 2022-01-31 VITALS — HEIGHT: 65 IN | BODY MASS INDEX: 27.79 KG/M2

## 2022-01-31 DIAGNOSIS — N64.9 BREAST DISORDER: ICD-10-CM

## 2022-01-31 PROCEDURE — 97110 THERAPEUTIC EXERCISES: CPT

## 2022-01-31 PROCEDURE — G0279 TOMOSYNTHESIS, MAMMO: HCPCS

## 2022-01-31 PROCEDURE — 97140 MANUAL THERAPY 1/> REGIONS: CPT

## 2022-01-31 ASSESSMENT — PAIN DESCRIPTION - ONSET: ONSET: AWAKENED FROM SLEEP

## 2022-01-31 ASSESSMENT — PAIN DESCRIPTION - FREQUENCY: FREQUENCY: CONTINUOUS

## 2022-01-31 ASSESSMENT — PAIN DESCRIPTION - ORIENTATION: ORIENTATION: LEFT

## 2022-01-31 ASSESSMENT — PAIN DESCRIPTION - PROGRESSION: CLINICAL_PROGRESSION: GRADUALLY IMPROVING

## 2022-01-31 ASSESSMENT — PAIN DESCRIPTION - PAIN TYPE: TYPE: CHRONIC PAIN

## 2022-01-31 ASSESSMENT — PAIN DESCRIPTION - LOCATION: LOCATION: NECK

## 2022-01-31 ASSESSMENT — PAIN SCALES - GENERAL: PAINLEVEL_OUTOF10: 4

## 2022-01-31 ASSESSMENT — PAIN DESCRIPTION - DESCRIPTORS: DESCRIPTORS: TIGHTNESS;SPASM

## 2022-01-31 NOTE — PROGRESS NOTES
[address] [address]  [address]  FJNIQ:[phone]        Date: 2022  Patient: Larry Carrasco  : 1980  ACCT #: [de-identified]  Referring Practitioner: Dr Geovanna Davis  Diagnosis: Trigger point of neck and Myofascial pain L  Treatment Diagnosis: Cervical and  L UT pain    Visit Information:  PT Visit Information  Onset Date: 22  [facility] [place] [facility] [place]  Total # of Visits Approved:  (48 units 22 to 3/11/22)  Total # of Visits to Date: 3  No Show: 0  Canceled Appointment: 0  Progress Note Counter:  (6/48 units until 3/11/22)    Subjective: Pt stated that the left side of her neck is really sore and painful and it is a challenge for her to move her neck. Stated she has spasms all the time on the left side. Comments: RTD 3/15/22  HEP Compliance:  [] Good [x] Fair [] Poor [] Reports not doing due to:    Vital Signs  Patient Currently in Pain: Yes   Pain Screening  Patient Currently in Pain: Yes  Pain Assessment  Pain Assessment: 0-10  Pain Level: 4  Pain Type: Chronic pain  Pain Location: Neck  Pain Orientation: Left  Pain Radiating Towards: Left side of neck down into the delotoid area.   Pain Descriptors: Tightness;Spasm  Pain Frequency: Continuous  Pain Onset: Awakened from sleep  Clinical Progression: Gradually improving    OBJECTIVE:   Exercises  Exercise 1: UT stretch, levator stretch 3 x 10 seconds  Exercise 2: scap retraction 10 x 5 seconds  Exercise 3: chest pulls x 5 3 sec hold  blue tband  Exercise 4: midrow x 10 (slight pain in L)  Exercise 5: prone scap*  Exercise 6: bicep/tricep band  blue tband each 5 reps 3 sec hold  Exercise 20: HEP: UT stretch, scap retraction, use of heat    Strength: [x] NT  [] MMT completed:      ROM: [x] NT   [] ROM measurements:      Manual:   Manual therapy  Manual traction: *  Soft Tissue Mobalization: TPR L UT x 10 minutes  Other: DN/Education ( 4mins) to normalize inflammation, dec pain and provide improved biomechanics ( see DN diagram for sites needled that will be scanned into EMR upon D/C) performed by Amie Nicole PT    Modalities:  Modalities  Moist heat: *  Ultrasound: L UT and cervical paraspinal x7 mins 1. 1 mhz     *Indicates exercise, modality, or manual techniques to be initiated when appropriate    Assessment: Body structures, Functions, Activity limitations: Decreased functional mobility ,Decreased ADL status,Decreased ROM,Decreased strength,Increased pain,Decreased posture,Decreased balance  Assessment: Reviewed with patient POC this date progressing patient through gentle cervical ROM and strengthening exercises. Exercises included UT/levator stretches, scap retractions, and tband rows. Performed STM on L UT to decrease spasms and pain and US to the L UT. Treatment Diagnosis: Cervical and  L UT pain  Prognosis: Good  Patient Education: IDN benefits, risks, and procedures, and provided written handout of HEP and educational info for [employer]; IDN consent form signed    Goals:  Short term goals  Time Frame for Short term goals: 2 weeks  Short term goal 1: The pt will demonstrate improved postural awareness requiring <25% VC's throughout treatment  Short term goal 2: Decrease cervical pain 50% to assist with improved functional gains. Long term goals  Time Frame for Long term goals : 4-6 weeks  Long term goal 1: Indep HEP for symptom management  Long term goal 2: Pt demo improved overall function by reporting greater than 80% per functional survey score  Long term goal 3: Pain-free cervical AROM symmetrical and  WNL allowing an increase in ADL tolerance without compensatory mvmts. Long term goal 4: Improve B shoulder/scap strength 4-/5 to 4/5 to  allow patient to return to normal work out routine. Progress toward goals: Continued to improve  AROM in working towards LTG #4.     POST-PAIN       Pain Rating (0-10 pain scale):   4/10   Location and pain description same as pre-treatment unless indicated. Action: [] NA   [x] Perform HEP  [x] Meds as prescribed  [] Modalities as prescribed   [] Call Physician      Frequency/Duration:  Plan  Times per week: 2  Plan weeks: 4-6  Current Treatment Recommendations: Strengthening,ROM,Home Exercise Program,Integrated Dry Needling,Neuromuscular Re-education,Manual Therapy - Soft Tissue Mobilization,Modalities     Pt to continue current HEP. See objective section for any therapeutic exercise changes, additions or modifications this date.      PT Individual Minutes  Time In: 2362  Time Out: 4811  Minutes: 58  Timed Code Treatment Minutes: 51 Minutes  Procedure Minutes: n/a     Timed Activity Minutes Units   Ther Ex 30 2   US 7 0   Manual 14 1       Signature:  Electronically signed by Andrew Clement PTA on 1/31/22 at 5:22 PM EST  Electronically signed by Zoe Escoto PT on 2/2/2022 at 9:25 AM

## 2022-02-07 ENCOUNTER — HOSPITAL ENCOUNTER (OUTPATIENT)
Dept: PHYSICAL THERAPY | Age: 42
Setting detail: THERAPIES SERIES
Discharge: HOME | End: 2022-02-07
Payer: COMMERCIAL

## 2022-02-07 ASSESSMENT — PAIN DESCRIPTION - PROGRESSION: CLINICAL_PROGRESSION: GRADUALLY IMPROVING

## 2022-02-07 NOTE — PROGRESS NOTES
Therapy                            Cancellation/No-show Note      Date:  2022  Patient Name:  Clemente Polo  :  1980   MRN:  [id number]  Referring Practitioner: Dr Antony Verma  Diagnosis: Trigger point of neck and Myofascial pain L    Visit Information:  PT Visit Information  Onset Date: 22  PT Insurance Information: [employer]  Total # of Visits Approved:  (48 units 22 to 3/11/22)  Total # of Visits to Date: 3  No Show: 0  Canceled Appointment: 1  Progress Note Counter:  (6/48 units until 3/11/22) (cx'd on 22 - sick)    For today's appointment patient:  [x]  Cancelled  []  Rescheduled appointment  []  No-show   []  Called pt to remind of next appointment     Reason given by patient:  [x]  Patient ill  []  Conflicting appointment  []  No transportation    []  Conflict with work  []  No reason given  []  Other:      [] Pt has future appointments scheduled, no follow up needed  [x] Pt requests to be on hold.     Reason: Sick, pt will call to schedule when feeling better   If > 2 weeks please discuss with therapist.  [] Therapist to call pt for follow up     Comments:       Signature: Electronically signed by Kevin Lopez PTA on 22 at 2:12 PM EST

## 2022-02-15 ENCOUNTER — HOSPITAL ENCOUNTER (OUTPATIENT)
Dept: PHYSICAL THERAPY | Age: 42
Setting detail: THERAPIES SERIES
Discharge: HOME | End: 2022-02-15
Payer: COMMERCIAL

## 2022-02-15 PROCEDURE — 97140 MANUAL THERAPY 1/> REGIONS: CPT

## 2022-02-15 PROCEDURE — 97110 THERAPEUTIC EXERCISES: CPT

## 2022-02-15 ASSESSMENT — PAIN DESCRIPTION - PROGRESSION: CLINICAL_PROGRESSION: GRADUALLY WORSENING

## 2022-02-15 ASSESSMENT — PAIN SCALES - GENERAL: PAINLEVEL_OUTOF10: 7

## 2022-02-15 ASSESSMENT — PAIN DESCRIPTION - PAIN TYPE: TYPE: ACUTE PAIN;CHRONIC PAIN

## 2022-02-15 ASSESSMENT — PAIN DESCRIPTION - LOCATION: LOCATION: NECK

## 2022-02-15 ASSESSMENT — PAIN DESCRIPTION - FREQUENCY: FREQUENCY: CONTINUOUS

## 2022-02-15 ASSESSMENT — PAIN DESCRIPTION - ORIENTATION: ORIENTATION: LEFT

## 2022-02-15 NOTE — PROGRESS NOTES
[address] [address],[address]  [address]  PUOJF:[phone]        Date: 2/15/2022  Patient: Alfredo Acevedo  : 1980  ACCT #: [de-identified]  Referring Practitioner: Dr Agatha Musa  Diagnosis: Trigger point of neck and Myofascial pain L  Treatment Diagnosis: Cervical and  L UT pain    Visit Information:  PT Visit Information  Onset Date: 22  PT Insurance Information: [employer]  Total # of Visits Approved:  (48 units 22 to 3/11/22)  Total # of Visits to Date: 4  No Show: 0  Canceled Appointment: 1  Progress Note Counter: 3/12 (9/48 units until 3/11/22)    Subjective: Pt stated within the last week she fell down the stairs and landed on her left side. Stated bruised on the left side but stated she is getting better. Stated the pain is stil on the left side of the neck, but covered up by the new injury. she is self managing her symtoms and now she has swelling and pain in the left elbow. Comments: RTD 3/15/22  HEP Compliance:  [] Good [] Fair [x] Poor d/t recent fall [] Reports not doing due to:    Vital Signs  Patient Currently in Pain: Yes   Pain Screening  Patient Currently in Pain: Yes  Pain Assessment  Pain Assessment: 0-10  Pain Level: 7  Pain Type: Acute pain;Chronic pain  Pain Location: Neck  Pain Orientation: Left  Pain Descriptors: Tightness; Tender;Spasm  Pain Frequency: Continuous  Clinical Progression: Gradually worsening    OBJECTIVE:   Exercises  Exercise 1: UT stretch, levator stretch 3 x 10 seconds  Exercise 2: scap retraction 10 x 5 seconds  Exercise 7: modified session d/t recent fall  Exercise 20: HEP: UT stretch, scap retraction, use of heat    Strength: [x] NT  [] MMT completed:      ROM: [x] NT  [] ROM measurements:      Manual:   Manual therapy  Manual traction: *  Soft Tissue Mobalization: TPR L UT x 10 minutes    Modalities:  Modalities  Moist heat: *  Ultrasound: L UT and cervical paraspinal x8 mins 1. 1 mhz     *Indicates exercise, modality, or manual techniques to be initiated when appropriate    Assessment: Body structures, Functions, Activity limitations: Decreased functional mobility ,Decreased ADL status,Decreased ROM,Decreased strength,Increased pain,Decreased posture,Decreased balance  Assessment: Modified tx d/t a recent fall last week. Increase swelling and pain in the left arm. Pt able to get through gentle cervical ROM. Tenderness/pain noted with resistance given to the L arm. Pt will progress next visit per her tolerance. US and STM completed to the left trap/occipital areas. Tightness noted in the L trap>R with stm with several trigger points. Treatment Diagnosis: Cervical and  L UT pain  Prognosis: Good     Goals:  Short term goals  Time Frame for Short term goals: 2 weeks  Short term goal 1: The pt will demonstrate improved postural awareness requiring <25% VC's throughout treatment  Short term goal 2: Decrease cervical pain 50% to assist with improved functional gains. Long term goals  Time Frame for Long term goals : 4-6 weeks  Long term goal 1: Indep HEP for symptom management  Long term goal 2: Pt demo improved overall function by reporting greater than 80% per functional survey score  Long term goal 3: Pain-free cervical AROM symmetrical and  WNL allowing an increase in ADL tolerance without compensatory mvmts. Long term goal 4: Improve B shoulder/scap strength 4-/5 to 4/5 to  allow patient to return to normal work out routine. Progress toward goals:Continued with gentle cervical rom working towards LTG #3. POST-PAIN       Pain Rating (0-10 pain scale):   5/10   Location and pain description same as pre-treatment unless indicated.    Action: [] NA   [x] Perform HEP  [x] Meds as prescribed  [] Modalities as prescribed   [] Call Physician     Frequency/Duration:  Plan  Times per week: 2  Plan weeks: 4-6  Current Treatment Recommendations: Strengthening,ROM,Home Exercise Program,Integrated Dry Needling,Neuromuscular Re-education,Manual Therapy - Soft Tissue Mobilization,Modalities     Pt to continue current HEP. See objective section for any therapeutic exercise changes, additions or modifications this date.     PT Individual Minutes  Time In: 1340  Time Out: 1440  Minutes: 60  Timed Code Treatment Minutes: 48 Minutes  Procedure Minutes:n/a     Timed Activity Minutes Units   Ther Ex 30 2   US 8 0   Manual  10 1       Signature:  Electronically signed by Regis Briones PTA on 2/15/22 at 2:48 PM EST

## 2022-02-21 ENCOUNTER — HOSPITAL ENCOUNTER (OUTPATIENT)
Dept: PHYSICAL THERAPY | Age: 42
Setting detail: THERAPIES SERIES
Discharge: HOME | End: 2022-02-21
Payer: COMMERCIAL

## 2022-02-21 PROCEDURE — 97110 THERAPEUTIC EXERCISES: CPT

## 2022-02-21 PROCEDURE — 97140 MANUAL THERAPY 1/> REGIONS: CPT

## 2022-02-21 ASSESSMENT — PAIN SCALES - GENERAL: PAINLEVEL_OUTOF10: 4

## 2022-02-21 ASSESSMENT — PAIN DESCRIPTION - FREQUENCY: FREQUENCY: INTERMITTENT

## 2022-02-21 ASSESSMENT — PAIN DESCRIPTION - ONSET: ONSET: AWAKENED FROM SLEEP

## 2022-02-21 ASSESSMENT — PAIN DESCRIPTION - LOCATION: LOCATION: NECK

## 2022-02-21 ASSESSMENT — PAIN DESCRIPTION - PAIN TYPE: TYPE: CHRONIC PAIN

## 2022-02-21 ASSESSMENT — PAIN DESCRIPTION - ORIENTATION: ORIENTATION: LEFT

## 2022-02-21 ASSESSMENT — PAIN DESCRIPTION - PROGRESSION: CLINICAL_PROGRESSION: GRADUALLY WORSENING

## 2022-02-21 NOTE — PROGRESS NOTES
[address] [address]  [address]  SPTZW:[phone]        Date: 2022  Patient: Husam Gaines  : 1980  ACCT #: [de-identified]  Referring Practitioner: Dr Roark Sever  Diagnosis: Trigger point of neck and Myofascial pain L  Treatment Diagnosis: Cervical and  L UT pain    Visit Information:  PT Visit Information  Onset Date: 22  PT Insurance Information: [employer]  Total # of Visits Approved:  (48 units 22 to 3/11/22)  Total # of Visits to Date: 5  No Show: 0  Canceled Appointment: 1  Progress Note Counter:  (12/48 units until 3/11/22)    Subjective: Pt stated that the left side of her neck still has alot of tightness and spasms. Feels better for a short time, then back to the same pain on the left side of her neck. Comments: RTD 3/15/22  HEP Compliance:  [] Good [x] Fair [] Poor [] Reports not doing due to:    Vital Signs  Patient Currently in Pain: Yes   Pain Screening  Patient Currently in Pain: Yes  Pain Assessment  Pain Assessment: 0-10  Pain Level: 4  Pain Type: Chronic pain  Pain Location: Neck  Pain Orientation: Left  Pain Descriptors: Tightness; Tender;Spasm  Pain Frequency: Intermittent  Pain Onset: Awakened from sleep  Clinical Progression: Gradually worsening    OBJECTIVE:   Exercises  Exercise 1: UT stretch, levator stretch 3 x 10 seconds  Exercise 2: scap retraction 10 x 5 seconds  Exercise 3: chest pulls x 10 3 sec hold  green tband  Exercise 4: shoulder extension midrow x 5 green tband  Exercise 6: bicep band green tband each 5 reps 3 sec hold  Exercise 7: modified session d/t recent fall bruising L arm  Exercise 20: HEP: UT stretch, scap retraction, use of heat    Strength: [x] NT  [] MMT completed:     ROM: [x] NT  [] ROM measurements:       Manual:   Manual therapy  Manual traction: *  Soft Tissue Mobalization: TPR L UT x 10 minutes    Modalities:  Modalities  Moist heat: *  Ultrasound: L UT and cervical paraspinal x8 mins 1. 1 mhz     *Indicates exercise, modality, or manual techniques to be initiated when appropriate    Assessment: Body structures, Functions, Activity limitations: Decreased functional mobility ,Decreased ADL status,Decreased ROM,Decreased strength,Increased pain,Decreased posture,Decreased balance  Assessment: Modified session d/t increase L UE pain from her fall. Pt was able to complete UE exercises with green tband for a few reps of each listed. Trigger points noted through out the L trap with STM tx with minimal release noted . Progress as tolerated next visit. Treatment Diagnosis: Cervical and  L UT pain  Prognosis: Good     Goals:  Short term goals  Time Frame for Short term goals: 2 weeks  Short term goal 1: The pt will demonstrate improved postural awareness requiring <25% VC's throughout treatment  Short term goal 2: Decrease cervical pain 50% to assist with improved functional gains. Long term goals  Time Frame for Long term goals : 4-6 weeks  Long term goal 1: Indep HEP for symptom management  Long term goal 2: Pt demo improved overall function by reporting greater than 80% per functional survey score  Long term goal 3: Pain-free cervical AROM symmetrical and  WNL allowing an increase in ADL tolerance without compensatory mvmts. Long term goal 4: Improve B shoulder/scap strength 4-/5 to 4/5 to  allow patient to return to normal work out routine. Progress toward goals: continued to work on cervical rom and UE strength working towards LTG 3 and 4    POST-PAIN       Pain Rating (0-10 pain scale):   3/10   Location and pain description same as pre-treatment unless indicated.    Action: [] NA   [] Perform HEP  [] Meds as prescribed  [] Modalities as prescribed   [] Call Physician     Frequency/Duration:  Plan  Times per week: 2  Plan weeks: 4-6  Current Treatment Recommendations: Strengthening,ROM,Home Exercise Program,Integrated Dry Needling,Neuromuscular Re-education,Manual Therapy - Soft Tissue Mobilization,Modalities     Pt to continue current HEP. See objective section for any therapeutic exercise changes, additions or modifications this date.      PT Individual Minutes  Time In: 1300  Time Out: 4122  Minutes: 45  Timed Code Treatment Minutes: 43 Minutes  Procedure Minutes:n/a   Timed Activity Minutes Units   Ther Ex 30 2   US 5 0   Manual  8 1       Signature:  Electronically signed by Etta Christine PTA on 2/21/22 at 4:32 PM EST

## 2022-04-04 ENCOUNTER — OFFICE VISIT (OUTPATIENT)
Dept: PRIMARY CARE CLINIC | Age: 42
End: 2022-04-04
Payer: COMMERCIAL

## 2022-04-04 VITALS
HEIGHT: 65 IN | BODY MASS INDEX: 28.99 KG/M2 | HEART RATE: 81 BPM | SYSTOLIC BLOOD PRESSURE: 132 MMHG | DIASTOLIC BLOOD PRESSURE: 70 MMHG | RESPIRATION RATE: 18 BRPM | WEIGHT: 174 LBS | TEMPERATURE: 96.1 F | OXYGEN SATURATION: 99 %

## 2022-04-04 DIAGNOSIS — A60.00 GENITAL HERPES SIMPLEX, UNSPECIFIED SITE: ICD-10-CM

## 2022-04-04 DIAGNOSIS — Z01.419 ENCOUNTER FOR GYNECOLOGICAL EXAMINATION: ICD-10-CM

## 2022-04-04 DIAGNOSIS — Z01.419 ENCOUNTER FOR GYNECOLOGICAL EXAMINATION: Primary | ICD-10-CM

## 2022-04-04 PROCEDURE — 99396 PREV VISIT EST AGE 40-64: CPT | Performed by: INTERNAL MEDICINE

## 2022-04-04 RX ORDER — VALACYCLOVIR HYDROCHLORIDE 1 G/1
TABLET, FILM COATED ORAL
Qty: 50 TABLET | Refills: 3 | Status: SHIPPED | OUTPATIENT
Start: 2022-04-04

## 2022-04-04 SDOH — ECONOMIC STABILITY: FOOD INSECURITY: WITHIN THE PAST 12 MONTHS, THE FOOD YOU BOUGHT JUST DIDN'T LAST AND YOU DIDN'T HAVE MONEY TO GET MORE.: NEVER TRUE

## 2022-04-04 SDOH — ECONOMIC STABILITY: FOOD INSECURITY: WITHIN THE PAST 12 MONTHS, YOU WORRIED THAT YOUR FOOD WOULD RUN OUT BEFORE YOU GOT MONEY TO BUY MORE.: NEVER TRUE

## 2022-04-04 ASSESSMENT — ENCOUNTER SYMPTOMS
COUGH: 0
DIARRHEA: 0
WHEEZING: 0
NAUSEA: 0
VOMITING: 0
SHORTNESS OF BREATH: 0
ABDOMINAL PAIN: 0

## 2022-04-04 ASSESSMENT — SOCIAL DETERMINANTS OF HEALTH (SDOH): HOW HARD IS IT FOR YOU TO PAY FOR THE VERY BASICS LIKE FOOD, HOUSING, MEDICAL CARE, AND HEATING?: NOT HARD AT ALL

## 2022-04-04 NOTE — PROGRESS NOTES
Subjective:      Patient ID: Kinjal Swenson is a 36 y.o. female    Pap smear   HPI  Patient presents for a pap test today. Last pap test was at least 3 yrs ago and showed negative intraepithelial neoplasia (no record found). Denies hx cervical or breast cancer in self or family. No vaginal bleed, discharge or itch. Need Valtrex refill. History reviewed. No pertinent past medical history. Past Surgical History:   Procedure Laterality Date    BREAST BIOPSY       Social History     Socioeconomic History    Marital status:      Spouse name: Not on file    Number of children: Not on file    Years of education: Not on file    Highest education level: Not on file   Occupational History    Not on file   Tobacco Use    Smoking status: Never Smoker    Smokeless tobacco: Never Used   Vaping Use    Vaping Use: Never used   Substance and Sexual Activity    Alcohol use: Yes    Drug use: Never    Sexual activity: Yes   Other Topics Concern    Not on file   Social History Narrative    Not on file     Social Determinants of Health     Financial Resource Strain: Low Risk     Difficulty of Paying Living Expenses: Not hard at all   Food Insecurity: No Food Insecurity    Worried About Running Out of Food in the Last Year: Never true    [address] in the Last Year: Never true   Transportation Needs: No Transportation Needs    Lack of Transportation (Medical): No    Lack of Transportation (Non-Medical):  No   Physical Activity:     Days of Exercise per Week: Not on file    Minutes of Exercise per Session: Not on file   Stress:     Feeling of Stress : Not on file   Social Connections:     Frequency of Communication with Friends and Family: Not on file    Frequency of Social Gatherings with Friends and Family: Not on file    Attends Jew Services: Not on file    Active Member of Clubs or Organizations: Not on file    Attends Club or Organization Meetings: Not on file    Marital Status: Not on file   Intimate Partner Violence:     Fear of Current or Ex-Partner: Not on file    Emotionally Abused: Not on file    Physically Abused: Not on file    Sexually Abused: Not on file   Housing Stability:     Unable to Pay for Housing in the Last Year: Not on file    Number of [place] in the Last Year: Not on file    Unstable Housing in the Last Year: Not on file     Family History   Problem Relation Age of Onset    High Blood Pressure Mother     Diabetes Maternal Uncle     Diabetes Maternal Grandfather     Diabetes Maternal Uncle      Allergies:  Patient has no known allergies. There is no problem list on file for this patient. Current Outpatient Medications on File Prior to Visit   Medication Sig Dispense Refill    cyclobenzaprine (FLEXERIL) 10 MG tablet Take 1 tablet by mouth 2 times daily as needed for Muscle spasms 30 tablet 1    norethindrone-ethinyl estradiol (JUNEL FE 1/20) 1-20 MG-MCG per tablet TAKE ONE TABLET BY MOUTH DAILY 28 tablet 5    amLODIPine (NORVASC) 5 MG tablet TAKE ONE TABLET BY MOUTH EVERY DAY 90 tablet 0    losartan (COZAAR) 50 MG tablet TAKE ONE TABLET BY MOUTH DAILY 90 tablet 0    triamcinolone (KENALOG) 0.1 % ointment Use twice daily for 2 weeks 1 Tube 1    gabapentin (NEURONTIN) 100 MG capsule Take 1 capsule by mouth 2 times daily for 30 days. Intended supply: 90 days 60 capsule 1     No current facility-administered medications on file prior to visit. Review of Systems   Constitutional: Negative for chills, diaphoresis, fatigue and fever. Respiratory: Negative for cough, shortness of breath and wheezing. Cardiovascular: Negative for chest pain. Gastrointestinal: Negative for abdominal pain, diarrhea, nausea and vomiting. Endocrine: Negative for cold intolerance and heat intolerance. Genitourinary: Negative for dysuria and frequency. Neurological: Negative for dizziness and light-headedness.      Objective:   /70   Pulse 81   Temp 96.1 °F (35.6 °C)   Resp 18   Ht 5' 5\" (1.651 m)   Wt 174 lb (78.9 kg)   SpO2 99%   BMI 28.96 kg/m²     Physical Exam  Constitutional:       General: She is not in acute distress. Appearance: She is well-developed. She is not diaphoretic. Cardiovascular:      Rate and Rhythm: Normal rate and regular rhythm. Pulses: Normal pulses. Heart sounds: Normal heart sounds, S1 normal and S2 normal.   Pulmonary:      Effort: Pulmonary effort is normal. No tachypnea, accessory muscle usage or respiratory distress. Abdominal:      General: Bowel sounds are normal.   Genitourinary:     Comments:   Normal female external genitalia  Speculum applied and cervix located, normal vaginal wall mucosa   Brush applied to the cervix and specimen deposited into liquid preservative, mild bleeding noted  Bimanual examination:   No TTP, cervical external os closed, no CMT b/l, no masses palpated, uterus is ballotable  Neurological:      Mental Status: She is alert. Assessment:       Diagnosis Orders   1. Encounter for gynecological examination  Pap Smear   2. Genital herpes simplex, unspecified site Stable valACYclovir (VALTREX) 1 g tablet    needs refill of Valtrex       Plan:      Orders Placed This Encounter   Procedures    Pap Smear     Patient History:    No LMP recorded. OBGYN Status: Having periods  Past Surgical History:  No date: BREAST BIOPSY  Medications/Contraceptives Affecting Cytology     Combination Contraceptives - Oral Disp Start End     norethindrone-ethinyl estradiol (Alicia LEBLANC 1/20) 1-20 MG-MCG per tablet    28 tablet 1/12/2022     Sig: TAKE ONE TABLET BY MOUTH DAILY        Social History    Tobacco Use      Smoking status: Never Smoker      Smokeless tobacco: Never Used       Standing Status:   Future     Standing Expiration Date:   4/4/2023     Order Specific Question:   Collection Type     Answer:    Thin Prep     Order Specific Question:   Prior Abnormal Pap Test     Answer:   No     Order Specific Question:   Screening or Diagnostic     Answer:   Screening     Order Specific Question:   HPV Requested? Answer:   Yes     Order Specific Question:   High Risk Patient     Answer:   N/A     Orders Placed This Encounter   Medications    valACYclovir (VALTREX) 1 g tablet     Si g once daily for 5 days when you get an outbreak of herpes     Dispense:  50 tablet     Refill:  3     Return in about 6 months (around 10/4/2022) for follow up, with PCP.

## 2022-04-13 LAB
HPV COMMENT: NORMAL
HPV TYPE 16: NOT DETECTED
HPV TYPE 18: NOT DETECTED
HPVOH (OTHER TYPES): NOT DETECTED

## 2022-06-03 ENCOUNTER — OFFICE VISIT (OUTPATIENT)
Dept: PRIMARY CARE CLINIC | Age: 42
End: 2022-06-03
Payer: COMMERCIAL

## 2022-06-03 VITALS
WEIGHT: 176.8 LBS | RESPIRATION RATE: 18 BRPM | BODY MASS INDEX: 29.46 KG/M2 | TEMPERATURE: 97.6 F | DIASTOLIC BLOOD PRESSURE: 64 MMHG | SYSTOLIC BLOOD PRESSURE: 120 MMHG | OXYGEN SATURATION: 98 % | HEIGHT: 65 IN | HEART RATE: 82 BPM

## 2022-06-03 DIAGNOSIS — F31.60 BIPOLAR AFFECTIVE DISORDER, CURRENT EPISODE MIXED, CURRENT EPISODE SEVERITY UNSPECIFIED (HCC): Primary | ICD-10-CM

## 2022-06-03 PROCEDURE — G8427 DOCREV CUR MEDS BY ELIG CLIN: HCPCS | Performed by: INTERNAL MEDICINE

## 2022-06-03 PROCEDURE — 99213 OFFICE O/P EST LOW 20 MIN: CPT | Performed by: INTERNAL MEDICINE

## 2022-06-03 PROCEDURE — 1036F TOBACCO NON-USER: CPT | Performed by: INTERNAL MEDICINE

## 2022-06-03 PROCEDURE — G8419 CALC BMI OUT NRM PARAM NOF/U: HCPCS | Performed by: INTERNAL MEDICINE

## 2022-06-03 RX ORDER — QUETIAPINE FUMARATE 25 MG/1
25 TABLET, FILM COATED ORAL DAILY
Qty: 60 TABLET | Refills: 3 | Status: SHIPPED | OUTPATIENT
Start: 2022-06-03 | End: 2022-07-07

## 2022-06-03 ASSESSMENT — ENCOUNTER SYMPTOMS
NAUSEA: 0
SHORTNESS OF BREATH: 0
COUGH: 0
DIARRHEA: 0
WHEEZING: 0
SINUS PAIN: 0
RHINORRHEA: 0
ABDOMINAL PAIN: 0
VOMITING: 0

## 2022-06-03 ASSESSMENT — PATIENT HEALTH QUESTIONNAIRE - PHQ9
4. FEELING TIRED OR HAVING LITTLE ENERGY: 0
SUM OF ALL RESPONSES TO PHQ QUESTIONS 1-9: 1
SUM OF ALL RESPONSES TO PHQ QUESTIONS 1-9: 1
8. MOVING OR SPEAKING SO SLOWLY THAT OTHER PEOPLE COULD HAVE NOTICED. OR THE OPPOSITE, BEING SO FIGETY OR RESTLESS THAT YOU HAVE BEEN MOVING AROUND A LOT MORE THAN USUAL: 0
3. TROUBLE FALLING OR STAYING ASLEEP: 0
SUM OF ALL RESPONSES TO PHQ QUESTIONS 1-9: 1
2. FEELING DOWN, DEPRESSED OR HOPELESS: 0
7. TROUBLE CONCENTRATING ON THINGS, SUCH AS READING THE NEWSPAPER OR WATCHING TELEVISION: 0
6. FEELING BAD ABOUT YOURSELF - OR THAT YOU ARE A FAILURE OR HAVE LET YOURSELF OR YOUR FAMILY DOWN: 0
1. LITTLE INTEREST OR PLEASURE IN DOING THINGS: 1
SUM OF ALL RESPONSES TO PHQ QUESTIONS 1-9: 1
10. IF YOU CHECKED OFF ANY PROBLEMS, HOW DIFFICULT HAVE THESE PROBLEMS MADE IT FOR YOU TO DO YOUR WORK, TAKE CARE OF THINGS AT HOME, OR GET ALONG WITH OTHER PEOPLE: 0
9. THOUGHTS THAT YOU WOULD BE BETTER OFF DEAD, OR OF HURTING YOURSELF: 0
SUM OF ALL RESPONSES TO PHQ9 QUESTIONS 1 & 2: 1
5. POOR APPETITE OR OVEREATING: 0

## 2022-06-03 ASSESSMENT — COLUMBIA-SUICIDE SEVERITY RATING SCALE - C-SSRS
2. HAVE YOU ACTUALLY HAD ANY THOUGHTS OF KILLING YOURSELF?: NO
1. WITHIN THE PAST MONTH, HAVE YOU WISHED YOU WERE DEAD OR WISHED YOU COULD GO TO SLEEP AND NOT WAKE UP?: NO
6. HAVE YOU EVER DONE ANYTHING, STARTED TO DO ANYTHING, OR PREPARED TO DO ANYTHING TO END YOUR LIFE?: NO

## 2022-06-03 NOTE — PROGRESS NOTES
Subjective:      Patient ID: Marylin Harrison is a 36 y.o. female    Depression f/u  HPI  Pt presents for follow up regarding management of anxiety and depression. Self-Dced all antidepressants since last yr. However, due to increased symptoms(depression, anxiety and difficulty staying focused) she underwent eval by psychiatry. Diagnosed with bipolar disorder but declines offer of mood stabilizer. History reviewed. No pertinent past medical history. Past Surgical History:   Procedure Laterality Date    BREAST BIOPSY       Social History     Socioeconomic History    Marital status:      Spouse name: Not on file    Number of children: Not on file    Years of education: Not on file    Highest education level: Not on file   Occupational History    Not on file   Tobacco Use    Smoking status: Never Smoker    Smokeless tobacco: Never Used   Vaping Use    Vaping Use: Never used   Substance and Sexual Activity    Alcohol use: Yes    Drug use: Never    Sexual activity: Yes   Other Topics Concern    Not on file   Social History Narrative    Not on file     Social Determinants of Health     Financial Resource Strain: Low Risk     Difficulty of Paying Living Expenses: Not hard at all   Food Insecurity: No Food Insecurity    Worried About Running Out of Food in the Last Year: Never true    [address] in the Last Year: Never true   Transportation Needs: No Transportation Needs    Lack of Transportation (Medical): No    Lack of Transportation (Non-Medical):  No   Physical Activity:     Days of Exercise per Week: Not on file    Minutes of Exercise per Session: Not on file   Stress:     Feeling of Stress : Not on file   Social Connections:     Frequency of Communication with Friends and Family: Not on file    Frequency of Social Gatherings with Friends and Family: Not on file    Attends [employer] Services: Not on file    Active Member of Clubs or Organizations: Not on file    Attends Club or Organization Meetings: Not on file    Marital Status: Not on file   Intimate Partner Violence:     Fear of Current or Ex-Partner: Not on file    Emotionally Abused: Not on file    Physically Abused: Not on file    Sexually Abused: Not on file   Housing Stability:     Unable to Pay for Housing in the Last Year: Not on file    Number of [place] in the Last Year: Not on file    Unstable Housing in the Last Year: Not on file     Family History   Problem Relation Age of Onset    High Blood Pressure Mother     Diabetes Maternal Uncle     Diabetes Maternal Grandfather     Diabetes Maternal Uncle      Allergies:  Patient has no known allergies. There is no problem list on file for this patient. Current Outpatient Medications on File Prior to Visit   Medication Sig Dispense Refill    valACYclovir (VALTREX) 1 g tablet 1 g once daily for 5 days when you get an outbreak of herpes 50 tablet 3    cyclobenzaprine (FLEXERIL) 10 MG tablet Take 1 tablet by mouth 2 times daily as needed for Muscle spasms 30 tablet 1    norethindrone-ethinyl estradiol (JUNEL FE 1/20) 1-20 MG-MCG per tablet TAKE ONE TABLET BY MOUTH DAILY 28 tablet 5    triamcinolone (KENALOG) 0.1 % ointment Use twice daily for 2 weeks 1 Tube 1    gabapentin (NEURONTIN) 100 MG capsule Take 1 capsule by mouth 2 times daily for 30 days. Intended supply: 90 days 60 capsule 1    amLODIPine (NORVASC) 5 MG tablet TAKE ONE TABLET BY MOUTH EVERY DAY (Patient not taking: Reported on 6/3/2022) 90 tablet 0    losartan (COZAAR) 50 MG tablet TAKE ONE TABLET BY MOUTH DAILY (Patient not taking: Reported on 6/3/2022) 90 tablet 0     No current facility-administered medications on file prior to visit. Review of Systems   Constitutional: Negative for chills, diaphoresis, fatigue and fever. HENT: Negative for congestion, ear discharge, ear pain, rhinorrhea and sinus pain. Respiratory: Negative for cough, shortness of breath and wheezing. Cardiovascular: Negative for chest pain. Gastrointestinal: Negative for abdominal pain, diarrhea, nausea and vomiting. Endocrine: Negative for cold intolerance and heat intolerance. Genitourinary: Negative for dysuria and frequency. Neurological: Negative for dizziness and light-headedness. Psychiatric/Behavioral: Positive for dysphoric mood. The patient is nervous/anxious. Objective:   /64   Pulse 82   Temp 97.6 °F (36.4 °C)   Resp 18   Ht 5' 5\" (1.651 m)   Wt 176 lb 12.8 oz (80.2 kg)   SpO2 98%   BMI 29.42 kg/m²     Physical Exam  Constitutional:       General: She is not in acute distress. Appearance: She is not diaphoretic. Cardiovascular:      Rate and Rhythm: Normal rate and regular rhythm. Pulses: Normal pulses. Heart sounds: Normal heart sounds, S1 normal and S2 normal.   Pulmonary:      Effort: Pulmonary effort is normal. No respiratory distress. Breath sounds: Normal breath sounds. No wheezing or rales. Chest:      Chest wall: No tenderness. Abdominal:      General: Bowel sounds are normal.      Tenderness: There is no abdominal tenderness. Neurological:      General: No focal deficit present. Cranial Nerves: No cranial nerve deficit. Assessment:       Diagnosis Orders   1. Bipolar affective disorder, current episode mixed, current episode severity unspecified ([facility] [address] Inadequately Controlled QUEtiapine (SEROQUEL) 25 MG tablet    will commence Seroquel instead. Will follow with psychiatry     Plan:      No orders of the defined types were placed in this encounter. Orders Placed This Encounter   Medications    QUEtiapine (SEROQUEL) 25 MG tablet     Sig: Take 1 tablet by mouth daily     Dispense:  60 tablet     Refill:  3     Return in about 2 weeks (around 6/17/2022) for follow up, with PCP.

## 2022-07-07 ENCOUNTER — OFFICE VISIT (OUTPATIENT)
Dept: PRIMARY CARE CLINIC | Age: 42
End: 2022-07-07
Payer: COMMERCIAL

## 2022-07-07 VITALS
WEIGHT: 174 LBS | SYSTOLIC BLOOD PRESSURE: 128 MMHG | HEART RATE: 83 BPM | TEMPERATURE: 97.2 F | BODY MASS INDEX: 28.99 KG/M2 | HEIGHT: 65 IN | OXYGEN SATURATION: 98 % | DIASTOLIC BLOOD PRESSURE: 88 MMHG

## 2022-07-07 DIAGNOSIS — T24.212A SECOND DEGREE BURN OF LEFT THIGH, INITIAL ENCOUNTER: Primary | ICD-10-CM

## 2022-07-07 PROCEDURE — G8427 DOCREV CUR MEDS BY ELIG CLIN: HCPCS | Performed by: NURSE PRACTITIONER

## 2022-07-07 PROCEDURE — G8419 CALC BMI OUT NRM PARAM NOF/U: HCPCS | Performed by: NURSE PRACTITIONER

## 2022-07-07 PROCEDURE — 99213 OFFICE O/P EST LOW 20 MIN: CPT | Performed by: NURSE PRACTITIONER

## 2022-07-07 PROCEDURE — 1036F TOBACCO NON-USER: CPT | Performed by: NURSE PRACTITIONER

## 2022-07-07 RX ORDER — CEPHALEXIN 500 MG/1
500 CAPSULE ORAL 2 TIMES DAILY
Qty: 14 CAPSULE | Refills: 0 | Status: SHIPPED | OUTPATIENT
Start: 2022-07-07 | End: 2022-07-14

## 2022-07-07 RX ORDER — BUPROPION HYDROCHLORIDE 150 MG/1
TABLET ORAL
COMMUNITY
Start: 2022-06-30

## 2022-07-07 ASSESSMENT — PATIENT HEALTH QUESTIONNAIRE - PHQ9
SUM OF ALL RESPONSES TO PHQ QUESTIONS 1-9: 0
9. THOUGHTS THAT YOU WOULD BE BETTER OFF DEAD, OR OF HURTING YOURSELF: 0
3. TROUBLE FALLING OR STAYING ASLEEP: 0
10. IF YOU CHECKED OFF ANY PROBLEMS, HOW DIFFICULT HAVE THESE PROBLEMS MADE IT FOR YOU TO DO YOUR WORK, TAKE CARE OF THINGS AT HOME, OR GET ALONG WITH OTHER PEOPLE: 0
SUM OF ALL RESPONSES TO PHQ QUESTIONS 1-9: 0
2. FEELING DOWN, DEPRESSED OR HOPELESS: 0
7. TROUBLE CONCENTRATING ON THINGS, SUCH AS READING THE NEWSPAPER OR WATCHING TELEVISION: 0
SUM OF ALL RESPONSES TO PHQ9 QUESTIONS 1 & 2: 0
8. MOVING OR SPEAKING SO SLOWLY THAT OTHER PEOPLE COULD HAVE NOTICED. OR THE OPPOSITE, BEING SO FIGETY OR RESTLESS THAT YOU HAVE BEEN MOVING AROUND A LOT MORE THAN USUAL: 0
1. LITTLE INTEREST OR PLEASURE IN DOING THINGS: 0
5. POOR APPETITE OR OVEREATING: 0
6. FEELING BAD ABOUT YOURSELF - OR THAT YOU ARE A FAILURE OR HAVE LET YOURSELF OR YOUR FAMILY DOWN: 0
4. FEELING TIRED OR HAVING LITTLE ENERGY: 0

## 2022-07-07 ASSESSMENT — ENCOUNTER SYMPTOMS
EYE REDNESS: 0
EYE ITCHING: 0
SORE THROAT: 0
SINUS PAIN: 0
VOMITING: 0
CONSTIPATION: 0
NAUSEA: 0
APNEA: 0
COUGH: 0
TROUBLE SWALLOWING: 0
SHORTNESS OF BREATH: 0
COLOR CHANGE: 1
ABDOMINAL DISTENTION: 0
CHEST TIGHTNESS: 0
WHEEZING: 0
DIARRHEA: 0

## 2022-07-07 NOTE — PATIENT INSTRUCTIONS
Patient Education        [place]: Care Instructions  Overview     Burns--even minor ones--can be very painful. A minor burn may heal within several days, while a more serious burn may take weeks or even months to healcompletely. You may notice that the burned area feels tight and hard while it is healing. It is important to continue to move the area as the burn heals to prevent lossof motion or loss of function in the area. When your skin is damaged by a burn, you have a greater risk of infection. Keep the wound clean and change the bandages regularly to prevent infection and helpthe burn heal.  Burns can leave permanent scars. Taking good care of the burn as it heals mayhelp prevent bad scars. The doctor has checked you carefully, but problems can develop later. If you notice any problems or new symptoms, get medical treatment right away. Follow-up care is a key part of your treatment and safety. Be sure to make and go to all appointments, and call your doctor if you are having problems. It's also a good idea to know your test results and keep alist of the medicines you take. How can you care for yourself at home?  If your doctor told you how to care for your burn, follow your doctor's instructions. If you did not get instructions, follow this general advice:  ? Wash the burn every day with a mild soap and water. Don't use hydrogen peroxide or alcohol, which can slow healing. ? Gently pat the burn dry after you wash it. ? Apply a thin layer of antibiotic ointment or petroleum jelly on the burn. ? You may cover the burn with a nonstick bandage. There are many bandage products available. Be sure to read the product label for correct use. ? Replace the bandage as needed.  Protect your burn while it is healing. Cover your burn if you are going out in the cold or the sun. ? Wear long sleeves if the burn is on your hands or arms. ? Wear a hat if the burn is on your face.   ? Wear socks and shoes if the burn is on your feet.  Do not break blisters open. This increases the chance of infection. If a blister breaks open by itself, blot up the liquid, and leave the skin that covered the blister. This helps protect the new skin.  If your doctor prescribed antibiotics, take them as directed. Do not stop taking them just because you feel better. You need to take the full course of antibiotics. For pain and itching   Take pain medicines exactly as directed. ? If the doctor gave you a prescription medicine for pain, take it as prescribed. ? If you are not taking a prescription pain medicine, ask your doctor if you can take an over-the-counter medicine.  If the burn itches, try not to scratch it. Try an over-the-counter antihistamine. Be safe with medicines. Read and follow all instructions on the label. When should you call for help? Call your doctor now or seek immediate medical care if:     Your pain gets worse.      You have symptoms of infection, such as:  ? Increased pain, swelling, warmth, or redness near the burn. ? Red streaks leading from the burn. ? Pus draining from the burn. ? A fever. Watch closely for changes in your health, and be sure to contact your doctor if:     You do not get better as expected. Where can you learn more? Go to [email or web address] [email or web address] org and sign in to your [facility] account. Enter G735 in the [email or web address] box to learn more about \"Burns: Care Instructions. \"     If you do not have an account, please click on the \"Sign Up Now\" link. Current as of: March 9, 2022               Content Version: 13.3  © 2006-2022 [employer]. Care instructions adapted under license by [facility] ([facility]). If you have questions about a medical condition or this instruction, always ask your healthcare professional. [place] [address] any warranty or liability for your use of this information.   Discussed signs and symptoms which require immediate follow-up in ED/call to 911. Patient verbalized understanding. Antibiotic Instructions: Complete the full course of antibiotics as ordered. Take each dose with a small snack or meal to lessen potential GI upset. To prevent antibiotic resistance, please take medication as ordered and for the full duration even if you start to feel better. Consider intake of yogurt or probiotic during antibiotic use and for a few days after to help reduce the risk of developing a secondary infection. Separate the yogurt and antibiotic by at least 1 hour. Avoid alcohol while taking antibiotics.

## 2022-07-07 NOTE — PROGRESS NOTES
Subjective:      Patient ID: Gonzalo Hall is a 36 y.o. female who presents today for:  Chief Complaint   Patient presents with    Burn     Burn from hot fluid while cooking on 07/04, blisters, burning feeling around. Left thigh. HPI        Pt here today with c/o 2nd degree burn as the burn is red, blistered and minimal pain noted. Pt reports Marlon Vee was taking a moffett out of her oven when it splashed upon her inner left upper thigh on 7/4/22. Pt reports taking the beef ribs out of oven and the pan splashed on her thigh. PT declines any distress, fevers, chills, numbness, tingling. Pt reports cleaning the burn and adding neosporin to the open areas and covering the areas with blisters. History reviewed. No pertinent past medical history. Past Surgical History:   Procedure Laterality Date    BREAST BIOPSY       Social History     Socioeconomic History    Marital status:      Spouse name: Not on file    Number of children: Not on file    Years of education: Not on file    Highest education level: Not on file   Occupational History    Not on file   Tobacco Use    Smoking status: Never Smoker    Smokeless tobacco: Never Used   Vaping Use    Vaping Use: Never used   Substance and Sexual Activity    Alcohol use: Yes    Drug use: Never    Sexual activity: Yes   Other Topics Concern    Not on file   Social History Narrative    Not on file     Social Determinants of Health     Financial Resource Strain: Low Risk     Difficulty of Paying Living Expenses: Not hard at all   Food Insecurity: No Food Insecurity    Worried About Running Out of Food in the Last Year: Never true    [address] in the Last Year: Never true   Transportation Needs: No Transportation Needs    Lack of Transportation (Medical): No    Lack of Transportation (Non-Medical):  No   Physical Activity:     Days of Exercise per Week: Not on file    Minutes of Exercise per Session: Not on file   Stress:     Feeling of Stress : Not on file   Social Connections:     Frequency of Communication with Friends and Family: Not on file    Frequency of Social Gatherings with Friends and Family: Not on file    Attends Faith Services: Not on file    Active Member of [address] [place] [address] [employer] or Organizations: Not on file    Attends Club or Organization Meetings: Not on file    Marital Status: Not on file   Intimate Partner Violence:     Fear of Current or Ex-Partner: Not on file    Emotionally Abused: Not on file    Physically Abused: Not on file    Sexually Abused: Not on file   Housing Stability:     Unable to Pay for Housing in the Last Year: Not on file    Number of [place] in the Last Year: Not on file    Unstable Housing in the Last Year: Not on file     Family History   Problem Relation Age of Onset    High Blood Pressure Mother     Diabetes Maternal Uncle     Diabetes Maternal Grandfather     Diabetes Maternal Uncle      Not on File      Review of Systems   Constitutional: Negative for activity change, appetite change, chills, fatigue and fever. HENT: Negative for congestion, drooling, ear pain, hearing loss, postnasal drip, sinus pain, sore throat and trouble swallowing. Eyes: Negative for redness, itching and visual disturbance. Respiratory: Negative for apnea, cough, chest tightness, shortness of breath and wheezing. Cardiovascular: Negative for chest pain and palpitations. Gastrointestinal: Negative for abdominal distention, constipation, diarrhea, nausea and vomiting. Endocrine: Negative for heat intolerance. Genitourinary: Negative for difficulty urinating and urgency. Musculoskeletal: Negative for arthralgias, gait problem, myalgias and neck stiffness. Skin: Positive for color change, rash and wound (2 of the area of the burn are semi opened and then the other area has small blisters noted. ). Neurological: Negative for tremors, seizures and facial asymmetry. Hematological: Negative for adenopathy. Psychiatric/Behavioral: Negative for confusion and suicidal ideas. The patient is not hyperactive. All other systems reviewed and are negative. Objective:   /88 (Site: Right Upper Arm, Position: Sitting, Cuff Size: Large Adult)   Pulse 83   Temp 97.2 °F (36.2 °C)   Ht 5' 5\" (1.651 m) Comment: per chrat/pt  Wt 174 lb (78.9 kg)   LMP 06/30/2022   SpO2 98%   Breastfeeding No   BMI 28.96 kg/m²     Physical Exam  Vitals and nursing note reviewed. Constitutional:       General: She is awake. She is not in acute distress. Appearance: Normal appearance. She is well-developed and well-groomed. She is not ill-appearing, toxic-appearing or diaphoretic. HENT:      Head: Normocephalic and atraumatic. Nose: Nose normal.      Mouth/Throat:      Mouth: Mucous membranes are moist.   Eyes:      Extraocular Movements: Extraocular movements intact. Conjunctiva/sclera: Conjunctivae normal.      Pupils: Pupils are equal, round, and reactive to light. Cardiovascular:      Rate and Rhythm: Normal rate and regular rhythm. Pulses: Normal pulses. Heart sounds: Normal heart sounds. No murmur heard. Pulmonary:      Effort: Pulmonary effort is normal. No tachypnea, bradypnea, accessory muscle usage or respiratory distress. Breath sounds: Normal breath sounds and air entry. No stridor or transmitted upper airway sounds. No decreased breath sounds, wheezing or rhonchi. Abdominal:      General: Bowel sounds are normal. There is no distension. Palpations: Abdomen is soft. Tenderness: There is no abdominal tenderness. Musculoskeletal:         General: Tenderness (noted to innner thigh where rash is noted) present. No deformity. Normal range of motion. Cervical back: Normal range of motion. Lymphadenopathy:      Cervical: No cervical adenopathy. Skin:     General: Skin is warm and dry. Capillary Refill: Capillary refill takes less than 2 seconds.       Findings: Erythema, rash and wound (2 small blisters are open and starting to try to scab) present. No abscess or bruising. Neurological:      General: No focal deficit present. Mental Status: She is alert and oriented to person, place, and time. Mental status is at baseline. Motor: No weakness. Psychiatric:         Attention and Perception: Attention and perception normal.         Mood and Affect: Mood and affect normal.         Speech: Speech normal.         Behavior: Behavior normal. Behavior is cooperative. Thought Content: Thought content normal.         Judgment: Judgment normal.         Assessment:       Diagnosis Orders   1. Second degree burn of left thigh, initial encounter  silver sulfADIAZINE (SILVADENE) 1 % cream    cephALEXin (KEFLEX) 500 MG capsule         Plan:      No orders of the defined types were placed in this encounter. Orders Placed This Encounter   Medications    silver sulfADIAZINE (SILVADENE) 1 % cream     Sig: Apply topically daily. Dispense:  50 g     Refill:  0    cephALEXin (KEFLEX) 500 MG capsule     Sig: Take 1 capsule by mouth 2 times daily for 7 days     Dispense:  14 capsule     Refill:  0     Pt here today and shows no distress but aware to keep the wound/burn clean and dry. Pt advised not to attach the dry guaze as this will stick to the burn/wound. Pt aware if she has any fevers, chills, numbness, weakness to go to the ER. Pt shows no distress today and was given non adherent 4x4 pads to cover and dress the wound at home after cleaning it and applying ATB ointment cream to it along with silvadene to the blistered areas of burn. Pt aware to take cool showers. Pt left the [facility] today in stable condition. Return if symptoms worsen or fail to improve. Reviewed with the patient: current clinical status, medications, activities and diet.      Side effects, adverse effects of the medication prescribed today, as well as treatment plan and result expectations have been discussed with the patient who expresses understanding and desires to proceed. Close follow up to evaluate treatment results and for coordination of care. I have reviewed the patient's medical history in detail and updated the computerized patient record.       NICOLE Drake - CNP

## 2022-12-25 ENCOUNTER — APPOINTMENT (OUTPATIENT)
Dept: GENERAL RADIOLOGY | Age: 42
End: 2022-12-25
Payer: COMMERCIAL

## 2022-12-25 ENCOUNTER — APPOINTMENT (OUTPATIENT)
Dept: CT IMAGING | Age: 42
End: 2022-12-25
Payer: COMMERCIAL

## 2022-12-25 ENCOUNTER — HOSPITAL ENCOUNTER (EMERGENCY)
Age: 42
Discharge: HOME | End: 2022-12-25
Payer: COMMERCIAL

## 2022-12-25 VITALS
TEMPERATURE: 97.8 F | OXYGEN SATURATION: 98 % | HEIGHT: 65 IN | DIASTOLIC BLOOD PRESSURE: 99 MMHG | HEART RATE: 89 BPM | SYSTOLIC BLOOD PRESSURE: 155 MMHG | RESPIRATION RATE: 18 BRPM | WEIGHT: 165 LBS | BODY MASS INDEX: 27.49 KG/M2

## 2022-12-25 DIAGNOSIS — S82.842A CLOSED BIMALLEOLAR FRACTURE OF LEFT ANKLE, INITIAL ENCOUNTER: Primary | ICD-10-CM

## 2022-12-25 LAB
HCG, URINE, POC: NEGATIVE
Lab: NORMAL
NEGATIVE QC PASS/FAIL: NORMAL
POSITIVE QC PASS/FAIL: NORMAL

## 2022-12-25 PROCEDURE — 29515 APPLICATION SHORT LEG SPLINT: CPT

## 2022-12-25 PROCEDURE — 99284 EMERGENCY DEPT VISIT MOD MDM: CPT

## 2022-12-25 PROCEDURE — 73700 CT LOWER EXTREMITY W/O DYE: CPT

## 2022-12-25 PROCEDURE — 73610 X-RAY EXAM OF ANKLE: CPT

## 2022-12-25 PROCEDURE — 73630 X-RAY EXAM OF FOOT: CPT

## 2022-12-25 PROCEDURE — 6370000000 HC RX 637 (ALT 250 FOR IP): Performed by: NURSE PRACTITIONER

## 2022-12-25 RX ORDER — HYDROCODONE BITARTRATE AND ACETAMINOPHEN 5; 325 MG/1; MG/1
2 TABLET ORAL ONCE
Status: COMPLETED | OUTPATIENT
Start: 2022-12-25 | End: 2022-12-25

## 2022-12-25 RX ORDER — HYDROCODONE BITARTRATE AND ACETAMINOPHEN 5; 325 MG/1; MG/1
1 TABLET ORAL EVERY 4 HOURS PRN
Qty: 18 TABLET | Refills: 0 | Status: SHIPPED | OUTPATIENT
Start: 2022-12-25 | End: 2022-12-28

## 2022-12-25 RX ADMIN — HYDROCODONE BITARTRATE AND ACETAMINOPHEN 2 TABLET: 5; 325 TABLET ORAL at 18:03

## 2022-12-25 ASSESSMENT — PAIN DESCRIPTION - ORIENTATION
ORIENTATION: LEFT

## 2022-12-25 ASSESSMENT — PAIN - FUNCTIONAL ASSESSMENT
PAIN_FUNCTIONAL_ASSESSMENT: 0-10
PAIN_FUNCTIONAL_ASSESSMENT: 0-10

## 2022-12-25 ASSESSMENT — PAIN SCALES - GENERAL
PAINLEVEL_OUTOF10: 9
PAINLEVEL_OUTOF10: 9
PAINLEVEL_OUTOF10: 8
PAINLEVEL_OUTOF10: 7

## 2022-12-25 ASSESSMENT — ENCOUNTER SYMPTOMS: RESPIRATORY NEGATIVE: 1

## 2022-12-25 ASSESSMENT — PAIN DESCRIPTION - LOCATION
LOCATION: ANKLE

## 2022-12-25 ASSESSMENT — PAIN DESCRIPTION - DESCRIPTORS: DESCRIPTORS: SHARP

## 2022-12-25 NOTE — PLAN OF CARE
37 y/o female presenting with trimalleolar ankle fracture after falling down stairs. Reviewed XR and discussed with ED staff. Fracture is mildly displaced with no skin tenting noted. Please obtain CT and discharge patient NWB in a posterior splint. Will coordinate follow up with Dr. Aditya Luis at [address] [place] [address] for Tuesday. Contact information listed below:    Dr. Aditya Luis  [phone]  [address] [place] [address]      Anali Jordan DPM PGY-2  06/23/23   5:55 PM

## 2022-12-25 NOTE — DISCHARGE INSTRUCTIONS
ACTIVITY:   - Keep Posterior splint clean, dry and intact  - Do not bear weight to your left lower extremity    ELEVATION: Elevate the left lower extremity above the level of your heart as much as possible. PHONE NUMBERS:   [phone], to contact Dr. Paul Camara office with any concerns, during office hours. [phone], after office hours and on weekends. You will be directed to the podiatric surgery resident on call. IN AN EMERGENCY, IF YOU ARE UNABLE TO REACH YOUR PHYSICIAN, GO TO THE NEAREST EMERGENCY ROOM. FOLLOW UP APPOINTMENT:   Please schedule a follow up with Dr. Ricky Sanchez at [facility] [facility] for Tuesday, 12/27/22. Contact information listed below:    Dr. Ricky Sanchez  [phone]  [address] [place] [address]

## 2022-12-25 NOTE — ED PROVIDER NOTES
[facility]  EMERGENCY DEPARTMENT ENCOUNTER      Pt Name: John Lynn  MRN: [id number]  [place] 1980  Date of evaluation: 12/25/2022  Provider: NICOLE Renee [id number]       Chief Complaint   Patient presents with    Ankle Injury     Left ankle injury, stumbled down 3 steps, did not fall on ground, did not hit head           HISTORY OF PRESENT ILLNESS   (Location/Symptom, Timing/Onset, Context/Setting, Quality, Duration, Modifying Factors, Severity)  Note limiting factors. John Lynn is a 36 y.o. female who presents to the emergency department      70-year-old -American female comes the ER with complaints of pain and swelling of her left ankle and foot after falling down the steps at home. She states her foot appeared to be twisted backwards and she straightened it out. This injury just prior to coming to the ER. She denies striking her head or loss of consciousness. She did take a dose of ibuprofen prior to coming to the ER. She states she is unable to ambulate on that foot due to pain. Nursing Notes were reviewed. REVIEW OF SYSTEMS    (2-9 systems for level 4, 10 or more for level 5)     Review of Systems   Constitutional: Negative. Respiratory: Negative. Cardiovascular: Negative. Musculoskeletal:         Left ankle pain and swelling   Skin: Negative. Neurological: Negative. Psychiatric/Behavioral: Negative. Except as noted above the remainder of the review of systems was reviewed and negative. PAST MEDICAL HISTORY   History reviewed. No pertinent past medical history.       SURGICAL HISTORY       Past Surgical History:   Procedure Laterality Date    BREAST BIOPSY           CURRENT MEDICATIONS       Previous Medications    AMLODIPINE (NORVASC) 5 MG TABLET    TAKE ONE TABLET BY MOUTH EVERY DAY    BUPROPION (WELLBUTRIN XL) 150 MG EXTENDED RELEASE TABLET    TAKE ONE TABLET BY MOUTH ONCE A DAY IN THE MORNING CYCLOBENZAPRINE (FLEXERIL) 10 MG TABLET    Take 1 tablet by mouth 2 times daily as needed for Muscle spasms    GABAPENTIN (NEURONTIN) 100 MG CAPSULE    Take 1 capsule by mouth 2 times daily for 30 days. Intended supply: 90 days    LOSARTAN (COZAAR) 50 MG TABLET    TAKE ONE TABLET BY MOUTH DAILY    NORETHINDRONE-ETHINYL ESTRADIOL (JUNEL FE 1/20) 1-20 MG-MCG PER TABLET    TAKE ONE TABLET BY MOUTH DAILY    SILVER SULFADIAZINE (SILVADENE) 1 % CREAM    Apply topically daily. TRIAMCINOLONE (KENALOG) 0.1 % OINTMENT    Use twice daily for 2 weeks    VALACYCLOVIR (VALTREX) 1 G TABLET    1 g once daily for 5 days when you get an outbreak of herpes       ALLERGIES     Patient has no known allergies. FAMILY HISTORY       Family History   Problem Relation Age of Onset    High Blood Pressure Mother     Diabetes Maternal Uncle     Diabetes Maternal Grandfather     Diabetes Maternal Uncle           SOCIAL HISTORY       Social History     Socioeconomic History    Marital status:      Spouse name: None    Number of children: None    Years of education: None    Highest education level: None   Tobacco Use    Smoking status: Never    Smokeless tobacco: Never   Vaping Use    Vaping Use: Never used   Substance and Sexual Activity    Alcohol use: Yes    Drug use: Never    Sexual activity: Yes     Social Determinants of Health     Financial Resource Strain: Low Risk     Difficulty of Paying Living Expenses: Not hard at all   Food Insecurity: No Food Insecurity    Worried About Running Out of Food in the Last Year: Never true    Ran Out of Food in the Last Year: Never true   Transportation Needs: No Transportation Needs    Lack of Transportation (Medical): No    Lack of Transportation (Non-Medical):  No       SCREENINGS    Sonal Coma Scale  Eye Opening: Spontaneous  Best Verbal Response: Oriented  Best Motor Response: Obeys commands  Sonal Coma Scale Score: 15          PHYSICAL EXAM    (up to 7 for level 4, 8 or more for level 5)     ED Triage Vitals [12/25/22 1637]   BP Temp Temp src Heart Rate Resp SpO2 Height Weight   (!) 155/99 97.8 °F (36.6 °C) -- 89 16 98 % 5' 5\" (1.651 m) 165 lb (74.8 kg)       Physical Exam  Vitals and nursing note reviewed. Constitutional:       Appearance: Normal appearance. Eyes:      Extraocular Movements: Extraocular movements intact. Pupils: Pupils are equal, round, and reactive to light. Cardiovascular:      Rate and Rhythm: Normal rate and regular rhythm. Pulmonary:      Effort: Pulmonary effort is normal.      Breath sounds: Normal breath sounds. Musculoskeletal:      Comments: Moderate tenderness and swelling noted to the entire left ankle area. Tenderness and swelling noted to the dorsum of the left foot. PMS is intact. Range of motion to the left ankle is greatly limited due to pain. Skin:     General: Skin is warm and dry. Neurological:      General: No focal deficit present. Mental Status: She is alert and oriented to person, place, and time. Psychiatric:         Mood and Affect: Mood normal.         Behavior: Behavior normal.       DIAGNOSTIC RESULTS     EKG: All EKG's are interpreted by the Emergency Department Physician who either signs or Co-signs this chart in the absence of a cardiologist.        RADIOLOGY:   Non-plain film images such as CT, Ultrasound and MRI are read by the radiologist. Plain radiographic images are visualized and preliminarily interpreted by the emergency physician with the below findings:        Interpretation per the Radiologist below, if available at the time of this note:    XR ANKLE LEFT (MIN 3 VIEWS)   Final Result   Displaced bi malleolar fracture of the left ankle with slight widening of the   ankle mortise and significant soft tissue swelling both medially and   laterally. No additional fractures are identified in the left foot.          XR FOOT LEFT (MIN 3 VIEWS)   Final Result   Displaced bi malleolar fracture of the left ankle with slight widening of the   ankle mortise and significant soft tissue swelling both medially and   laterally. No additional fractures are identified in the left foot. CT ANKLE LEFT WO CONTRAST    (Results Pending)         ED BEDSIDE ULTRASOUND:   Performed by ED Physician - none    LABS:  Labs Reviewed   POC PREGNANCY UR-QUAL       All other labs were within normal range or not returned as of this dictation. EMERGENCY DEPARTMENT COURSE and DIFFERENTIAL DIAGNOSIS/MDM:   Vitals:    Vitals:    12/25/22 1637 12/25/22 1803   BP: (!) 155/99    Pulse: 89    Resp: 16 18   Temp: 97.8 °F (36.6 °C)    SpO2: 98%    Weight: 165 lb (74.8 kg)    Height: 5' 5\" (1.651 m)            MDM  Number of Diagnoses or Management Options  Closed bimalleolar fracture of left ankle, initial encounter  Diagnosis management comments: Closed bimalleolar fracture of the left ankle with mild displacement, this reading and interpretation per this provider and reviewed with Carley Cm, the podiatry resident. Podiatry resident recommends CAT scan of the left ankle, posterior splint with no weightbearing until seen by podiatry. The above x-ray result, reading by radiology is reviewed and noted. REASSESSMENT            PROCEDURES:  Unless otherwise noted below, none     Procedures        FINAL IMPRESSION      1. Closed bimalleolar fracture of left ankle, initial encounter          DISPOSITION/PLAN   DISPOSITION Decision To Discharge 12/25/2022 06:35:59 PM      PATIENT REFERRED TO:  Marylen Lobe, [address]  [phone]    Schedule an appointment as soon as possible for a visit in 2 day(s)  Address has changed to:    [facility]. [address] [place] [address]    DISCHARGE MEDICATIONS:  New Prescriptions    HYDROCODONE-ACETAMINOPHEN (NORCO) 5-325 MG PER TABLET    Take 1 tablet by mouth every 4 hours as needed for Pain for up to 3 days. Intended supply: 3 days.  Take lowest dose possible to manage pain Max Daily Amount: 6 tablets     Controlled Substances Monitoring:     No flowsheet data found.     (Please note that portions of this note were completed with a voice recognition program.  Efforts were made to edit the dictations but occasionally words are mis-transcribed.)    NICOLE Ramos CNP (electronically signed)  Attending Emergency Physician          Sanjuan Frankel, APRN - CNP  12/25/22 19 NICOLE Gonzalez CNP  12/25/22 1924

## 2022-12-25 NOTE — ED TRIAGE NOTES
Patient presents to the er with complaints of left ankle pain   States that prior to arrival she somehow rotated her left ankle  States that she didn't fall but states that she thinks she got tripped up on them   States that left ankle was pointing to the right and she moved it back in alignment  Took a motrin prior to arrival  Visible swelling through sock   Non weight bearing on left ankle

## 2022-12-25 NOTE — ED NOTES
Pt stated before xray that she took a negative preg test yesterday and will sign the waiver. Made aware CT is much more radiation.  Agreed to preg test. Took patient to bathroom via wheelchair     Ema Mcclendon RN  12/25/22 3578

## 2022-12-26 NOTE — ED NOTES
D/C instructions and rx given as well as the time the next dose can be taken. Verbalized understanding. States her pain is starting to get better. Toes pink with good cap refill. Took patient out via wheelchair to  that is pulling car around.      Norris Conner RN  12/25/22 1321

## 2022-12-30 ENCOUNTER — OFFICE VISIT (OUTPATIENT)
Dept: FAMILY MEDICINE CLINIC | Age: 42
End: 2022-12-30

## 2022-12-30 VITALS
OXYGEN SATURATION: 97 % | BODY MASS INDEX: 27.49 KG/M2 | SYSTOLIC BLOOD PRESSURE: 160 MMHG | DIASTOLIC BLOOD PRESSURE: 98 MMHG | WEIGHT: 165 LBS | HEIGHT: 65 IN | TEMPERATURE: 97.9 F | HEART RATE: 88 BPM

## 2022-12-30 DIAGNOSIS — Z01.810 PREOP CARDIOVASCULAR EXAM: Primary | ICD-10-CM

## 2022-12-30 DIAGNOSIS — Z01.818 PREOP TESTING: ICD-10-CM

## 2022-12-30 DIAGNOSIS — I10 PRIMARY HYPERTENSION: ICD-10-CM

## 2022-12-30 DIAGNOSIS — R94.31 ABNORMAL EKG: ICD-10-CM

## 2022-12-30 LAB
ANION GAP SERPL CALCULATED.3IONS-SCNC: 13 MEQ/L (ref 9–15)
BASOPHILS ABSOLUTE: 0.1 K/UL (ref 0–0.2)
BASOPHILS RELATIVE PERCENT: 0.8 %
BUN BLDV-MCNC: 9 MG/DL (ref 6–20)
CALCIUM SERPL-MCNC: 9.2 MG/DL (ref 8.5–9.9)
CHLORIDE BLD-SCNC: 98 MEQ/L (ref 95–107)
CO2: 27 MEQ/L (ref 20–31)
CREAT SERPL-MCNC: 0.57 MG/DL (ref 0.5–0.9)
EOSINOPHILS ABSOLUTE: 0.1 K/UL (ref 0–0.7)
EOSINOPHILS RELATIVE PERCENT: 1.5 %
GFR SERPL CREATININE-BSD FRML MDRD: >60 ML/MIN/{1.73_M2}
GLUCOSE BLD-MCNC: 90 MG/DL (ref 70–99)
HCT VFR BLD CALC: 39.7 % (ref 37–47)
HEMOGLOBIN: 12.8 G/DL (ref 12–16)
LYMPHOCYTES ABSOLUTE: 1.1 K/UL (ref 1–4.8)
LYMPHOCYTES RELATIVE PERCENT: 14.2 %
MCH RBC QN AUTO: 26.7 PG (ref 27–31.3)
MCHC RBC AUTO-ENTMCNC: 32.2 % (ref 33–37)
MCV RBC AUTO: 83.1 FL (ref 79.4–94.8)
MONOCYTES ABSOLUTE: 0.6 K/UL (ref 0.2–0.8)
MONOCYTES RELATIVE PERCENT: 7 %
NEUTROPHILS ABSOLUTE: 6.2 K/UL (ref 1.4–6.5)
NEUTROPHILS RELATIVE PERCENT: 76.5 %
PDW BLD-RTO: 14.2 % (ref 11.5–14.5)
PLATELET # BLD: 326 K/UL (ref 130–400)
POTASSIUM SERPL-SCNC: 3.4 MEQ/L (ref 3.4–4.9)
RBC # BLD: 4.78 M/UL (ref 4.2–5.4)
SODIUM BLD-SCNC: 138 MEQ/L (ref 135–144)
WBC # BLD: 8 K/UL (ref 4.8–10.8)

## 2022-12-30 RX ORDER — NALTREXONE HYDROCHLORIDE 50 MG/1
1 TABLET, FILM COATED ORAL DAILY
COMMUNITY
Start: 2022-12-09

## 2022-12-30 RX ORDER — DEXTROAMPHETAMINE SACCHARATE, AMPHETAMINE ASPARTATE MONOHYDRATE, DEXTROAMPHETAMINE SULFATE AND AMPHETAMINE SULFATE 3.75; 3.75; 3.75; 3.75 MG/1; MG/1; MG/1; MG/1
1 CAPSULE, EXTENDED RELEASE ORAL 2 TIMES DAILY
COMMUNITY
Start: 2022-09-28

## 2022-12-30 NOTE — PROGRESS NOTES
HunPreoperative Consultation      Oliva Law  YOB: 1980    Date of Service:  12/30/2022    Vitals:    12/30/22 1021 12/30/22 1027   BP: (!) 168/100 (!) 150/120   Pulse: 88    Temp: 97.9 °F (36.6 °C)    SpO2: 97%    Weight: 165 lb (74.8 kg)    Height: 5' 5\" (1.651 m)       Wt Readings from Last 2 Encounters:   12/30/22 165 lb (74.8 kg)   12/25/22 165 lb (74.8 kg)     BP Readings from Last 3 Encounters:   12/30/22 (!) 150/120   12/25/22 (!) 155/99   07/07/22 128/88        Chief Complaint   Patient presents with    Pre-op Exam     Allergies   Allergen Reactions    Latex      Added based on information entered during case entry, please review and add reactions, type, and severity as needed     No outpatient medications have been marked as taking for the 12/30/22 encounter (Office Visit) with NICLOE Burch CNP. This patient presents to the office today for a preoperative consultation at the request of surgeon, Dr. Omid Cantrell, who plans on performing Left ankle open reduction with internal fixation for trimalleolar ankle FX on January 4 at [facility]. The current problem began 6 days ago, and symptoms have been unchanged with time. Conservative therapy: N/A. Planned anesthesia: General   Known anesthesia problems: None   Bleeding risk: No recent or remote history of abnormal bleeding  Personal or FH of DVT/PE: No    Patient objection to receiving blood products: No    Patient denies any past medial history. Denies lung or cardiac issues in the past.   She has HX HTN and has not been taking her medications for BP  in \"awhile\". She does have losartan and norvasc at home. Denies any dizziness, CP, or palpitations. Denies any SOB. She does not use tobacco.   Says she does drink alcohol, socially. Denies any drug use. She denies any recent illness or fever,   Denies any GI or  symptoms. She has not taken any NSAID in the last 7 days and takes nothing OTC.    She has also not taken her naltrexone or Adderall since 12/25. Patient Active Problem List   Diagnosis    Closed bimalleolar fracture of left ankle       No past medical history on file. Past Surgical History:   Procedure Laterality Date    BREAST BIOPSY       Family History   Problem Relation Age of Onset    High Blood Pressure Mother     Diabetes Maternal Uncle     Diabetes Maternal Grandfather     Diabetes Maternal Uncle      Social History     Socioeconomic History    Marital status:      Spouse name: Not on file    Number of children: Not on file    Years of education: Not on file    Highest education level: Not on file   Occupational History    Not on file   Tobacco Use    Smoking status: Never    Smokeless tobacco: Never   Vaping Use    Vaping Use: Never used   Substance and Sexual Activity    Alcohol use: Yes    Drug use: Never    Sexual activity: Yes   Other Topics Concern    Not on file   Social History Narrative    Not on file     Social Determinants of Health     Financial Resource Strain: Low Risk     Difficulty of Paying Living Expenses: Not hard at all   Food Insecurity: No Food Insecurity    Worried About Running Out of Food in the Last Year: Never true    [address] in the Last Year: Never true   Transportation Needs: No Transportation Needs    Lack of Transportation (Medical): No    Lack of Transportation (Non-Medical): No   Physical Activity: Not on file   Stress: Not on file   Social Connections: Not on file   Intimate Partner Violence: Not on file   Housing Stability: Not on file       Review of Systems  A comprehensive review of systems was negative except for what was noted in the HPI. Physical Exam   Constitutional: She is oriented to person, place, and time. She appears well-developed and well-nourished. No distress. HENT:   Head: Normocephalic and atraumatic.    Mouth/Throat: Uvula is midline, oropharynx is clear and moist and mucous membranes are normal.   Eyes: Conjunctivae and EOM are normal. Pupils are equal, round, and reactive to light. Neck: Trachea normal and normal range of motion. Neck supple. No JVD present. Carotid bruit is not present. No mass and no thyromegaly present. Cardiovascular: Normal rate, regular rhythm, normal heart sounds and intact distal pulses. Exam reveals no gallop and no friction rub. No murmur heard. Pulmonary/Chest: Effort normal and breath sounds normal. No respiratory distress. She has no wheezes. She has no rales. Abdominal: Soft. Normal aorta and bowel sounds are normal. She exhibits no distension and no mass. There is no hepatosplenomegaly. No tenderness. Musculoskeletal: She exhibits no edema and no tenderness. Neurological: She is alert and oriented to person, place, and time. She has normal strength. No cranial nerve deficit or sensory deficit. Coordination and gait normal.   Skin: Skin is warm and dry. No rash noted. No erythema. Psychiatric: She has a normal mood and affect. Her behavior is normal.     EKG Interpretation:  sinus rhythm left atrial enlargement. Lab Review not applicable  Reviewed most recent labs, ordered labs and urine HCG today. Assessment:       36 y.o. patient with planned surgery as above. Known risk factors for perioperative complications: Hypertension  Current medications which may produce withdrawal symptoms if withheld perioperatively: none      Plan:     1. Preoperative workup as follows: ECG, hemoglobin, hematocrit, electrolytes, glucose  2. Change in medication regimen before surgery: Hold all medications on morning of surgery  3.  Prophylaxis for cardiac events with perioperative beta-blockers: Not indicated  ACC/AHA indications for pre-operative beta-blocker use:    Vascular surgery with history of postitive stress test  Intermediate or high risk surgery with history of CAD   Intermediate or high risk surgery with multiple clinical predictors of CAD- 2 of the following: history of compensated or prior heart failure, history of cerebrovascular disease, DM, or renal insufficiency    Routine administration of higher-dose, long-acting metoprolol in beta-blocker-naïve patients on the day of surgery, and in the absence of dose titration is associated with an overall increase in mortality. Beta-blockers should be started days to weeks prior to surgery and titrated to pulse < 70.  4. Deep vein thrombosis prophylaxis: knee high compression hose  5. EKG in office abnormal and BP was elevated. Advised patient to start back on her Losartan 50mg that she has at home and will order referral to cardiology for further evaluation.

## 2022-12-30 NOTE — RESULT ENCOUNTER NOTE
Please advise patient she has slight abnormality in her EKG likely d/t the high BP. Would suggest she start back on her medications and I placed referral to cardiology for preop clearance. Can we call cardiology and see if they can see her ASAP because she has surgery next week.

## 2023-01-04 ENCOUNTER — ANESTHESIA (OUTPATIENT)
Dept: OPERATING ROOM | Age: 43
End: 2023-01-04
Payer: COMMERCIAL

## 2023-01-04 ENCOUNTER — HOSPITAL ENCOUNTER (OUTPATIENT)
Dept: GENERAL RADIOLOGY | Age: 43
Discharge: HOME | End: 2023-01-06
Attending: STUDENT IN AN ORGANIZED HEALTH CARE EDUCATION/TRAINING PROGRAM
Payer: COMMERCIAL

## 2023-01-04 ENCOUNTER — HOSPITAL ENCOUNTER (OUTPATIENT)
Age: 43
Setting detail: OUTPATIENT SURGERY
Discharge: HOME | End: 2023-01-04
Attending: STUDENT IN AN ORGANIZED HEALTH CARE EDUCATION/TRAINING PROGRAM | Admitting: STUDENT IN AN ORGANIZED HEALTH CARE EDUCATION/TRAINING PROGRAM
Payer: COMMERCIAL

## 2023-01-04 ENCOUNTER — ANESTHESIA EVENT (OUTPATIENT)
Dept: OPERATING ROOM | Age: 43
End: 2023-01-04
Payer: COMMERCIAL

## 2023-01-04 VITALS
SYSTOLIC BLOOD PRESSURE: 149 MMHG | DIASTOLIC BLOOD PRESSURE: 95 MMHG | OXYGEN SATURATION: 99 % | TEMPERATURE: 98.5 F | WEIGHT: 165 LBS | RESPIRATION RATE: 16 BRPM | HEART RATE: 75 BPM | HEIGHT: 65 IN | BODY MASS INDEX: 27.49 KG/M2

## 2023-01-04 DIAGNOSIS — R52 PAIN: ICD-10-CM

## 2023-01-04 LAB
HCG, URINE, POC: NEGATIVE
Lab: NORMAL
NEGATIVE QC PASS/FAIL: NORMAL
POSITIVE QC PASS/FAIL: NORMAL

## 2023-01-04 PROCEDURE — 2500000003 HC RX 250 WO HCPCS: Performed by: ANESTHESIOLOGY

## 2023-01-04 PROCEDURE — 6360000002 HC RX W HCPCS: Performed by: ANESTHESIOLOGY

## 2023-01-04 PROCEDURE — 7100000011 HC PHASE II RECOVERY - ADDTL 15 MIN: Performed by: STUDENT IN AN ORGANIZED HEALTH CARE EDUCATION/TRAINING PROGRAM

## 2023-01-04 PROCEDURE — 3700000001 HC ADD 15 MINUTES (ANESTHESIA): Performed by: STUDENT IN AN ORGANIZED HEALTH CARE EDUCATION/TRAINING PROGRAM

## 2023-01-04 PROCEDURE — C1713 ANCHOR/SCREW BN/BN,TIS/BN: HCPCS | Performed by: STUDENT IN AN ORGANIZED HEALTH CARE EDUCATION/TRAINING PROGRAM

## 2023-01-04 PROCEDURE — 3700000000 HC ANESTHESIA ATTENDED CARE: Performed by: STUDENT IN AN ORGANIZED HEALTH CARE EDUCATION/TRAINING PROGRAM

## 2023-01-04 PROCEDURE — 64447 NJX AA&/STRD FEMORAL NRV IMG: CPT | Performed by: NURSE ANESTHETIST, CERTIFIED REGISTERED

## 2023-01-04 PROCEDURE — 7100000001 HC PACU RECOVERY - ADDTL 15 MIN: Performed by: STUDENT IN AN ORGANIZED HEALTH CARE EDUCATION/TRAINING PROGRAM

## 2023-01-04 PROCEDURE — 2709999900 HC NON-CHARGEABLE SUPPLY: Performed by: STUDENT IN AN ORGANIZED HEALTH CARE EDUCATION/TRAINING PROGRAM

## 2023-01-04 PROCEDURE — 6360000002 HC RX W HCPCS

## 2023-01-04 PROCEDURE — 7100000010 HC PHASE II RECOVERY - FIRST 15 MIN: Performed by: STUDENT IN AN ORGANIZED HEALTH CARE EDUCATION/TRAINING PROGRAM

## 2023-01-04 PROCEDURE — 2580000003 HC RX 258: Performed by: ANESTHESIOLOGY

## 2023-01-04 PROCEDURE — 3600000014 HC SURGERY LEVEL 4 ADDTL 15MIN: Performed by: STUDENT IN AN ORGANIZED HEALTH CARE EDUCATION/TRAINING PROGRAM

## 2023-01-04 PROCEDURE — 2720000010 HC SURG SUPPLY STERILE: Performed by: STUDENT IN AN ORGANIZED HEALTH CARE EDUCATION/TRAINING PROGRAM

## 2023-01-04 PROCEDURE — A4217 STERILE WATER/SALINE, 500 ML: HCPCS | Performed by: STUDENT IN AN ORGANIZED HEALTH CARE EDUCATION/TRAINING PROGRAM

## 2023-01-04 PROCEDURE — 3209999900 FLUORO FOR SURGICAL PROCEDURES

## 2023-01-04 PROCEDURE — 7100000000 HC PACU RECOVERY - FIRST 15 MIN: Performed by: STUDENT IN AN ORGANIZED HEALTH CARE EDUCATION/TRAINING PROGRAM

## 2023-01-04 PROCEDURE — 3600000004 HC SURGERY LEVEL 4 BASE: Performed by: STUDENT IN AN ORGANIZED HEALTH CARE EDUCATION/TRAINING PROGRAM

## 2023-01-04 PROCEDURE — 2580000003 HC RX 258: Performed by: STUDENT IN AN ORGANIZED HEALTH CARE EDUCATION/TRAINING PROGRAM

## 2023-01-04 PROCEDURE — 64445 NJX AA&/STRD SCIATIC NRV IMG: CPT | Performed by: NURSE ANESTHETIST, CERTIFIED REGISTERED

## 2023-01-04 PROCEDURE — C1769 GUIDE WIRE: HCPCS | Performed by: STUDENT IN AN ORGANIZED HEALTH CARE EDUCATION/TRAINING PROGRAM

## 2023-01-04 DEVICE — PLATE BONE 5 H LT DSTL FIB TI LCK: Type: IMPLANTABLE DEVICE | Site: ANKLE | Status: FUNCTIONAL

## 2023-01-04 DEVICE — SCREW BNE L18MM DIA3.5MM TI LO PROF FOR ANK FRAC SET: Type: IMPLANTABLE DEVICE | Site: ANKLE | Status: FUNCTIONAL

## 2023-01-04 DEVICE — SYSTEM IMPL TI UHMWPE KNOTLESS FOR SYNDESMOSIS FIX: Type: IMPLANTABLE DEVICE | Site: ANKLE | Status: FUNCTIONAL

## 2023-01-04 DEVICE — SCREW BNE L16MM DIA3MM FT ANK TI VAR ANG LOK LO PROF FOR: Type: IMPLANTABLE DEVICE | Site: ANKLE | Status: FUNCTIONAL

## 2023-01-04 DEVICE — SCREW BNE L50MM DIA4MM TI CANN PARTIALLY THRD LO PROF: Type: IMPLANTABLE DEVICE | Site: ANKLE | Status: FUNCTIONAL

## 2023-01-04 DEVICE — SCREW BNE L12MM DIA3.5MM ANK TI LO PROF: Type: IMPLANTABLE DEVICE | Site: ANKLE | Status: FUNCTIONAL

## 2023-01-04 DEVICE — SCREW BNE L14MM DIA3.5MM CORT FT ANK TI LOK LO PROF: Type: IMPLANTABLE DEVICE | Site: ANKLE | Status: FUNCTIONAL

## 2023-01-04 DEVICE — SCREW BNE L10MM DIA3MM FT ANK TI VAR ANG LOK LO PROF FOR: Type: IMPLANTABLE DEVICE | Site: ANKLE | Status: FUNCTIONAL

## 2023-01-04 DEVICE — SCREW BONE L12MM DIA3.5MM TI LCK LO PROF FOR ANK FX SYS: Type: IMPLANTABLE DEVICE | Site: ANKLE | Status: FUNCTIONAL

## 2023-01-04 DEVICE — SCREW BNE L14MM DIA3MM FT ANK TI VAR ANG LCK LO PROF: Type: IMPLANTABLE DEVICE | Site: ANKLE | Status: FUNCTIONAL

## 2023-01-04 DEVICE — IMPLANTABLE DEVICE: Type: IMPLANTABLE DEVICE | Site: ANKLE | Status: FUNCTIONAL

## 2023-01-04 RX ORDER — METOCLOPRAMIDE HYDROCHLORIDE 5 MG/ML
10 INJECTION INTRAMUSCULAR; INTRAVENOUS
Status: DISCONTINUED | OUTPATIENT
Start: 2023-01-04 | End: 2023-01-04 | Stop reason: HOSPADM

## 2023-01-04 RX ORDER — MIDAZOLAM HYDROCHLORIDE 1 MG/ML
INJECTION INTRAMUSCULAR; INTRAVENOUS PRN
Status: DISCONTINUED | OUTPATIENT
Start: 2023-01-04 | End: 2023-01-04 | Stop reason: SDUPTHER

## 2023-01-04 RX ORDER — ONDANSETRON 2 MG/ML
4 INJECTION INTRAMUSCULAR; INTRAVENOUS
Status: DISCONTINUED | OUTPATIENT
Start: 2023-01-04 | End: 2023-01-04 | Stop reason: HOSPADM

## 2023-01-04 RX ORDER — DIPHENHYDRAMINE HYDROCHLORIDE 50 MG/ML
12.5 INJECTION INTRAMUSCULAR; INTRAVENOUS
Status: DISCONTINUED | OUTPATIENT
Start: 2023-01-04 | End: 2023-01-04 | Stop reason: HOSPADM

## 2023-01-04 RX ORDER — MAGNESIUM HYDROXIDE 1200 MG/15ML
LIQUID ORAL CONTINUOUS PRN
Status: DISCONTINUED | OUTPATIENT
Start: 2023-01-04 | End: 2023-01-04 | Stop reason: HOSPADM

## 2023-01-04 RX ORDER — SODIUM CHLORIDE 9 MG/ML
INJECTION, SOLUTION INTRAVENOUS PRN
Status: DISCONTINUED | OUTPATIENT
Start: 2023-01-04 | End: 2023-01-04 | Stop reason: HOSPADM

## 2023-01-04 RX ORDER — SODIUM CHLORIDE 9 MG/ML
25 INJECTION, SOLUTION INTRAVENOUS PRN
Status: DISCONTINUED | OUTPATIENT
Start: 2023-01-04 | End: 2023-01-04 | Stop reason: HOSPADM

## 2023-01-04 RX ORDER — BUPIVACAINE HYDROCHLORIDE AND EPINEPHRINE 5; 5 MG/ML; UG/ML
INJECTION, SOLUTION EPIDURAL; INTRACAUDAL; PERINEURAL PRN
Status: DISCONTINUED | OUTPATIENT
Start: 2023-01-04 | End: 2023-01-04 | Stop reason: SDUPTHER

## 2023-01-04 RX ORDER — OXYCODONE HYDROCHLORIDE AND ACETAMINOPHEN 5; 325 MG/1; MG/1
TABLET ORAL
COMMUNITY
Start: 2022-12-27

## 2023-01-04 RX ORDER — SODIUM CHLORIDE 0.9 % (FLUSH) 0.9 %
5-40 SYRINGE (ML) INJECTION EVERY 12 HOURS SCHEDULED
Status: DISCONTINUED | OUTPATIENT
Start: 2023-01-04 | End: 2023-01-04 | Stop reason: HOSPADM

## 2023-01-04 RX ORDER — SODIUM CHLORIDE 0.9 % (FLUSH) 0.9 %
5-40 SYRINGE (ML) INJECTION PRN
Status: DISCONTINUED | OUTPATIENT
Start: 2023-01-04 | End: 2023-01-04 | Stop reason: HOSPADM

## 2023-01-04 RX ORDER — FENTANYL CITRATE 50 UG/ML
50 INJECTION, SOLUTION INTRAMUSCULAR; INTRAVENOUS EVERY 10 MIN PRN
Status: COMPLETED | OUTPATIENT
Start: 2023-01-04 | End: 2023-01-04

## 2023-01-04 RX ORDER — AMLODIPINE BESYLATE 10 MG/1
10 TABLET ORAL DAILY
COMMUNITY

## 2023-01-04 RX ORDER — SODIUM CHLORIDE, SODIUM LACTATE, POTASSIUM CHLORIDE, CALCIUM CHLORIDE 600; 310; 30; 20 MG/100ML; MG/100ML; MG/100ML; MG/100ML
INJECTION, SOLUTION INTRAVENOUS CONTINUOUS PRN
Status: DISCONTINUED | OUTPATIENT
Start: 2023-01-04 | End: 2023-01-04 | Stop reason: SDUPTHER

## 2023-01-04 RX ORDER — LOSARTAN POTASSIUM 25 MG/1
25 TABLET ORAL DAILY
COMMUNITY

## 2023-01-04 RX ORDER — PROPOFOL 10 MG/ML
INJECTION, EMULSION INTRAVENOUS PRN
Status: DISCONTINUED | OUTPATIENT
Start: 2023-01-04 | End: 2023-01-04 | Stop reason: SDUPTHER

## 2023-01-04 RX ORDER — MEPERIDINE HYDROCHLORIDE 25 MG/ML
12.5 INJECTION INTRAMUSCULAR; INTRAVENOUS; SUBCUTANEOUS
Status: COMPLETED | OUTPATIENT
Start: 2023-01-04 | End: 2023-01-04

## 2023-01-04 RX ORDER — FENTANYL CITRATE 50 UG/ML
INJECTION, SOLUTION INTRAMUSCULAR; INTRAVENOUS PRN
Status: DISCONTINUED | OUTPATIENT
Start: 2023-01-04 | End: 2023-01-04 | Stop reason: SDUPTHER

## 2023-01-04 RX ADMIN — FENTANYL CITRATE 25 MCG: 50 INJECTION, SOLUTION INTRAMUSCULAR; INTRAVENOUS at 13:02

## 2023-01-04 RX ADMIN — HYDROMORPHONE HYDROCHLORIDE 0.5 MG: 1 INJECTION, SOLUTION INTRAMUSCULAR; INTRAVENOUS; SUBCUTANEOUS at 16:14

## 2023-01-04 RX ADMIN — HYDROMORPHONE HYDROCHLORIDE 0.5 MG: 1 INJECTION, SOLUTION INTRAMUSCULAR; INTRAVENOUS; SUBCUTANEOUS at 16:00

## 2023-01-04 RX ADMIN — FENTANYL CITRATE 50 MCG: 50 INJECTION, SOLUTION INTRAMUSCULAR; INTRAVENOUS at 15:24

## 2023-01-04 RX ADMIN — FENTANYL CITRATE 25 MCG: 50 INJECTION, SOLUTION INTRAMUSCULAR; INTRAVENOUS at 14:08

## 2023-01-04 RX ADMIN — FENTANYL CITRATE 50 MCG: 50 INJECTION, SOLUTION INTRAMUSCULAR; INTRAVENOUS at 15:44

## 2023-01-04 RX ADMIN — PROPOFOL 20 MG: 10 INJECTION, EMULSION INTRAVENOUS at 13:02

## 2023-01-04 RX ADMIN — SODIUM CHLORIDE, POTASSIUM CHLORIDE, SODIUM LACTATE AND CALCIUM CHLORIDE: 600; 310; 30; 20 INJECTION, SOLUTION INTRAVENOUS at 12:16

## 2023-01-04 RX ADMIN — PROPOFOL 200 MCG/KG/MIN: 10 INJECTION, EMULSION INTRAVENOUS at 12:22

## 2023-01-04 RX ADMIN — FENTANYL CITRATE 50 MCG: 50 INJECTION, SOLUTION INTRAMUSCULAR; INTRAVENOUS at 14:29

## 2023-01-04 RX ADMIN — BUPIVACAINE HYDROCHLORIDE AND EPINEPHRINE BITARTRATE 25 ML: 5; .005 INJECTION, SOLUTION EPIDURAL; INTRACAUDAL; PERINEURAL at 11:24

## 2023-01-04 RX ADMIN — FENTANYL CITRATE 50 MCG: 50 INJECTION, SOLUTION INTRAMUSCULAR; INTRAVENOUS at 15:34

## 2023-01-04 RX ADMIN — FENTANYL CITRATE 50 MCG: 50 INJECTION, SOLUTION INTRAMUSCULAR; INTRAVENOUS at 15:14

## 2023-01-04 RX ADMIN — SODIUM CHLORIDE, POTASSIUM CHLORIDE, SODIUM LACTATE AND CALCIUM CHLORIDE: 600; 310; 30; 20 INJECTION, SOLUTION INTRAVENOUS at 14:21

## 2023-01-04 RX ADMIN — BUPIVACAINE HYDROCHLORIDE AND EPINEPHRINE BITARTRATE 15 ML: 5; .005 INJECTION, SOLUTION EPIDURAL; INTRACAUDAL; PERINEURAL at 11:20

## 2023-01-04 RX ADMIN — CEFAZOLIN 2000 MG: 10 INJECTION, POWDER, FOR SOLUTION INTRAVENOUS at 12:21

## 2023-01-04 RX ADMIN — MEPERIDINE HYDROCHLORIDE 12.5 MG: 25 INJECTION, SOLUTION INTRAMUSCULAR; INTRAVENOUS; SUBCUTANEOUS at 15:01

## 2023-01-04 RX ADMIN — PROPOFOL 30 MG: 10 INJECTION, EMULSION INTRAVENOUS at 12:24

## 2023-01-04 RX ADMIN — PROPOFOL 70 MG: 10 INJECTION, EMULSION INTRAVENOUS at 12:21

## 2023-01-04 RX ADMIN — MIDAZOLAM HYDROCHLORIDE 2 MG: 1 INJECTION, SOLUTION INTRAMUSCULAR; INTRAVENOUS at 11:16

## 2023-01-04 ASSESSMENT — PAIN DESCRIPTION - DESCRIPTORS
DESCRIPTORS: BURNING

## 2023-01-04 ASSESSMENT — PAIN SCALES - GENERAL
PAINLEVEL_OUTOF10: 5
PAINLEVEL_OUTOF10: 9
PAINLEVEL_OUTOF10: 5
PAINLEVEL_OUTOF10: 8
PAINLEVEL_OUTOF10: 6
PAINLEVEL_OUTOF10: 7
PAINLEVEL_OUTOF10: 9
PAINLEVEL_OUTOF10: 7
PAINLEVEL_OUTOF10: 8
PAINLEVEL_OUTOF10: 9

## 2023-01-04 ASSESSMENT — PAIN DESCRIPTION - LOCATION
LOCATION: ANKLE

## 2023-01-04 ASSESSMENT — PAIN DESCRIPTION - ORIENTATION
ORIENTATION: LEFT

## 2023-01-04 NOTE — BRIEF OP NOTE
Brief Postoperative Note      Patient: Nataliia Uriostegui  YOB: 1980  MRN: [id number]    Date of Procedure: 1/4/2023    Pre-Op Diagnosis: TRIMALLEOLAR ANKLE FRACTURE LEFT ANKLE    Post-Op Diagnosis: Same       Procedure(s):  OPEN REDUCTION INTERNAL FIXATION TRIMALLEOLAR ANKLE FRACTURE without posterior lip fixation, left  Stress radiographs, left ankle    Surgeon(s):  Paola Barakat DPM    Assistant:  Resident: Carley Roca DPM    Anesthesia: General    Estimated Blood Loss (mL): Minimal    Complications: None    Specimens:   * No specimens in log *    Implants:  Implant Name Type Inv. Item Serial No.  Lot No. LRB No. Used Action   SYSTEM IMPL TI UHMWPE KNOTLESS FOR SYNDESMOSIS FIX - [id number]  SYSTEM IMPL TI UHMWPE KNOTLESS FOR SYNDESMOSIS FIX  [employer]- [id number] Left 1 Implanted   SCREW BNE L18MM DIA3. 5MM TI LO PROF FOR ANK FRAC SET - [id number]  SCREW BNE L18MM DIA3. 5MM TI LO PROF FOR ANK FRAC SET  [employer]-  Left 1 Implanted   SCREW BNE L12MM DIA3. 5MM ANK TI LO PROF - [id number]  SCREW BNE L12MM DIA3. 5MM ANK TI LO PROF  [employer]-  Left 1 Implanted   SCREW BNE L52MM DIA4MM TI STEVEN PARTIALLY THRD LO PROF - [id number]  SCREW BNE L52MM DIA4MM TI STEVEN PARTIALLY THRD LO PROF  [employer]-  Left 1 Implanted   SCREW BNE L50MM DIA4MM TI STEVEN PARTIALLY THRD LO PROF - [id number]  SCREW BNE L50MM DIA4MM TI STEVEN PARTIALLY THRD LO PROF  [employer]WD  Left 1 Implanted   SCREW BNE L14MM DIA3.5MM ERIK FT ANK TI DIANA LO PROF - [id number]  SCREW BNE L14MM DIA3.5MM ERIK FT ANK TI DIANA LO PROF  [employer]WD  Left 1 Implanted   SCREW BONE L12MM DIA3. 5MM TI LCK LO PROF FOR ANK FX SYS - [id number]  SCREW BONE L12MM DIA3. 5MM TI LCK LO PROF FOR ANK FX SYS  [employer]WD  Left 1 Implanted   SCREW BNE L16MM DIA3MM FT ANK TI ROSA ANG DIANA LO PROF FOR - [id number]  SCREW BNE L16MM DIA3MM FT ANK TI ROSA ANG DIANA CLARK PROF FOR  [employer]WD  Left 1 Implanted   SCREW BNE L10MM DIA3MM FT ANK TI ROSA ANG DIANA LO PROF FOR - [id number]  SCREW BNE L10MM DIA3MM FT ANK TI ROSA ANG DIANA LO PROF FOR  [employer]WD  Left 1 Implanted   SCREW BNE L14MM DIA3MM FT ANK TI ROSA ANG LCK LO PROF - [id number]  SCREW BNE L14MM DIA3MM FT ANK TI ROSA ANG LCK LO PROF  [employer]WD  Left 2 Implanted   PLATE BONE 5 H LT DSTL FIB TI LCK - [id number]  PLATE BONE 5 H LT DSTL FIB TI LCK  [employer]WD  Left 1 Implanted         Drains: * No LDAs found *    Findings: trimalleolar left ankle fracture    Electronically signed by Rosemarie Sims DPM on 1/4/2023 at 2:59 PM

## 2023-01-04 NOTE — H&P
PODIATRIC HISTORY AND PHYSICAL UPDATE                                            EVALUATION DATE: 1/4/2023   EVALUATION TIME: 11:51 AM    The documented History and Physical (completed in the past 30 days) has been reviewed and the patient had a confirmatory musculoskeletal exam performed. The contents of the pre-operative assesment accurately reflect the patient's condition with the following additions or revisions since the H&P was completed:  No changes. This H&P can be found in the record dated 12/30/22.     SIGNATURE: Milena Villanueva DPM PATIENT NAME: Minor Mcdaniel   DATE: January 4, 2023 MRN: [id number]

## 2023-01-04 NOTE — ANESTHESIA POSTPROCEDURE EVALUATION
Department of Anesthesiology  Postprocedure Note    Patient: Thelma Berkowitz  MRN: [id number]  YOB: 1980  Date of evaluation: 1/4/2023      Procedure Summary     Date: 01/04/23 Room / Location: [address]    Anesthesia Start: 1216 Anesthesia Stop: 6587    Procedure: OPEN REDUCTION INTERNAL FIXATION TRIMALLEOLAR ANKLE FRACTURE (Left: Ankle) Diagnosis:       Closed trimalleolar fracture of left ankle, initial encounter      (TRIMALLEOLAR ANKLE FRACTURE LEFT ANKLE)    Surgeons: Emy Moreno DPM Responsible Provider: NICOLE Restrepo CRNA    Anesthesia Type: MAC, regional ASA Status: 2          Anesthesia Type: No value filed.     Jennifer Phase I:      Jennifer Phase II:        Anesthesia Post Evaluation    Patient location during evaluation: PACU  Patient participation: complete - patient participated  Level of consciousness: awake and awake and alert  Airway patency: patent  Nausea & Vomiting: no nausea and no vomiting  Complications: no  Cardiovascular status: blood pressure returned to baseline and hemodynamically stable  Respiratory status: acceptable  Hydration status: euvolemic

## 2023-01-04 NOTE — ANESTHESIA PRE PROCEDURE
Department of Anesthesiology  Preprocedure Note       Name:  Iron Chambers   Age:  36 y.o.  :  1980                                          MRN:  [id number]         Date:  2023      Surgeon: Shasta Yang):  Devora Laurent DPM    Procedure: Procedure(s):  OPEN REDUCTION INTERNAL FIXATION TRIMALLEOLAR ANKLE FRACTURE, LEFT ANKLE ARTHREX ANKLE FRACTURE SET WITH TIGHT ROPE, MINI C-ARM POPLITEAL AND SAPHENOUS BLOCK ([address])  2 HOURS  LATEX ALLERGY-JUAN    Medications prior to admission:   Prior to Admission medications    Medication Sig Start Date End Date Taking? Authorizing Provider   amphetamine-dextroamphetamine (ADDERALL XR) 15 MG extended release capsule Take 1 capsule by mouth in the morning and at bedtime. 22   Historical Provider, MD   naltrexone (DEPADE) 50 MG tablet Take 1 tablet by mouth daily 22   Historical Provider, MD       Current medications:    Current Facility-Administered Medications   Medication Dose Route Frequency Provider Last Rate Last Admin    sodium chloride flush 0.9 % injection 5-40 mL  5-40 mL IntraVENous 2 times per day Joan Epstein DPM        sodium chloride flush 0.9 % injection 5-40 mL  5-40 mL IntraVENous PRN Joan Epstein DPM        0.9 % sodium chloride infusion   IntraVENous PRN Joan Epstein DPM        ceFAZolin (ANCEF) 2000 mg in dextrose 5 % 100 mL IVPB  2,000 mg IntraVENous On Call to [facility], DPGRAY           Allergies:     Allergies   Allergen Reactions    Latex      Added based on information entered during case entry, please review and add reactions, type, and severity as needed       Problem List:    Patient Active Problem List   Diagnosis Code    Closed bimalleolar fracture of left ankle H73.006J       Past Medical History:        Diagnosis Date    ADHD     HTN (hypertension)        Past Surgical History:        Procedure Laterality Date    BREAST BIOPSY       SECTION      , 2016    TONSILLECTOMY AND ADENOIDECTOMY         Social History:    Social History     Tobacco Use    Smoking status: Never    Smokeless tobacco: Never   Substance Use Topics    Alcohol use: Not Currently                                Counseling given: Not Answered      Vital Signs (Current): There were no vitals filed for this visit. BP Readings from Last 3 Encounters:   12/30/22 (!) 160/98   12/25/22 (!) 155/99   07/07/22 128/88       NPO Status:                                                                                 BMI:   Wt Readings from Last 3 Encounters:   12/30/22 165 lb (74.8 kg)   12/25/22 165 lb (74.8 kg)   07/07/22 174 lb (78.9 kg)     There is no height or weight on file to calculate BMI.    CBC:   Lab Results   Component Value Date/Time    WBC 8.0 12/30/2022 11:36 AM    RBC 4.78 12/30/2022 11:36 AM    HGB 12.8 12/30/2022 11:36 AM    HCT 39.7 12/30/2022 11:36 AM    MCV 83.1 12/30/2022 11:36 AM    RDW 14.2 12/30/2022 11:36 AM     12/30/2022 11:36 AM       CMP:   Lab Results   Component Value Date/Time     12/30/2022 11:36 AM    K 3.4 12/30/2022 11:36 AM    CL 98 12/30/2022 11:36 AM    CO2 27 12/30/2022 11:36 AM    BUN 9 12/30/2022 11:36 AM    CREATININE 0.57 12/30/2022 11:36 AM    GFRAA >60.0 02/24/2021 04:16 PM    LABGLOM >60.0 12/30/2022 11:36 AM    GLUCOSE 90 12/30/2022 11:36 AM    PROT 7.5 02/24/2021 04:16 PM    CALCIUM 9.2 12/30/2022 11:36 AM    BILITOT 0.4 02/24/2021 04:16 PM    ALKPHOS 83 02/24/2021 04:16 PM    AST 11 02/24/2021 04:16 PM    ALT 10 02/24/2021 04:16 PM       POC Tests: No results for input(s): POCGLU, POCNA, POCK, POCCL, POCBUN, POCHEMO, POCHCT in the last 72 hours.     Coags: No results found for: PROTIME, INR, APTT    HCG (If Applicable): No results found for: PREGTESTUR, PREGSERUM, HCG, HCGQUANT     ABGs: No results found for: PHART, PO2ART, UHA3FDO, TTV9DLI, BEART, W3SDMDMF     Type & Screen (If Applicable):  No results found for: LABABO, LABRH    Drug/Infectious Status (If Applicable):  No results found for: HIV, HEPCAB    COVID-19 Screening (If Applicable): No results found for: COVID19        Anesthesia Evaluation    Airway: Mallampati: II     Neck ROM: full  Mouth opening: > = 3 FB   Dental:          Pulmonary:Negative Pulmonary ROS breath sounds clear to auscultation                             Cardiovascular:    (+) hypertension:,       ECG reviewed  Rhythm: regular             Beta Blocker:  Not on Beta Blocker         Neuro/Psych:   (+) psychiatric history:            GI/Hepatic/Renal: Neg GI/Hepatic/Renal ROS            Endo/Other: Negative Endo/Other ROS                    Abdominal:             Vascular: negative vascular ROS. Other Findings:           Anesthesia Plan      MAC and regional     ASA 2     (US guided Saphenous/popliteal nerve block  GA backup)  Induction: intravenous. MIPS: Prophylactic antiemetics administered. Anesthetic plan and risks discussed with patient.       Plan discussed with surgical team.    Attending anesthesiologist reviewed and agrees with Preprocedure content      Post-op pain plan if not by surgeon: single peripheral nerve block            Siri Montalvo MD   1/4/2023

## 2023-01-04 NOTE — DISCHARGE INSTRUCTIONS
POST OPERATIVE INSTRUCTIONS    RESTRICTIONS AFTER ANESTHESIA:   - Do not drive, work with heavy equipment or sign legal documents for 24 hours  - Rest at home for 24 hours following anesthesia  - You may experience light-headedness, dizziness, nausea, or sleepiness after surgery. Stay in bed if you experience these symptoms  - Do not stay alone. A responsible adult must be with you for 24 hours. - Do not take any alcoholic beverages or sleeping pills for the next 18 hours  - You may experience muscle aches and sore throat  - If you experience difficulty breathing and/or shortness of breath, seek IMMEDIATE    medical attention. DRESSING: Keep surgical area clean and dry. Do NOT remove dressing. You may notice blood upon your bandage. Bleeding is expected and your bandage may become stained. You may reinforce with gauze or ace bandage. BATHING: Sponge bath only or use of a cast protector in the shower until first post op visit. ACTIVITY: Non weight bearing left lower extremity   - After discharge from the surgery center, go directly home and limit your activities. - Keep children and pets away from your operative foot. - No heavy lifting.  - Posterior splint on left lower extremity, do not wear weight on left foot. ELEVATION: Elevate the operative site above the level of your heart for at least the next 3 days. This will help decrease pain and prevent swelling. Support the back of your knee with a pillow. ICE: Use ice pack behind left knee or at left ankle for 20 minutes on and off every hour when awake for the next 3 days. Do NOT fall asleep while using the ice pack. MEDICATION: Some degree of discomfort is to be expected after surgery and will improve gradually as the healing process continues. Pain medication has been prescribed for you. - Please take your pain medication as prescribed. Take with food. Set an alarm to take a dose of medication overnight.    - Do not drink alcohol, drive a car, operate any machinery, or work with heavy equipment while taking your prescription pain medication.  - You may restart your at home prescriptions. DIET: You may resume your diet. Advance your diet as tolerated. Keep yourself hydrated. POST OPERATIVE INSTRUCTIONS    IF YOU NOTICE:   Fever of 101 degrees or over. Foul smelling or purulent (pus) drainage from operative site. Increase in drainage . Sudden onset of pain that is unrelieved with pain medication. Observe operative extremity for circulation problems: change in color, coldness, numbness, or tingling. If any symptoms occur, Call office immediately or after hours emergency number. PHONE NUMBERS:   [phone], to contact Dr. Rockwell Mode office with any concerns, during office hours. [phone], after office hours and on weekends. You will be directed to the podiatric surgery resident on call. IN AN EMERGENCY, IF YOU ARE UNABLE TO REACH YOUR PHYSICIAN, GO TO THE NEAREST EMERGENCY ROOM. FOLLOW UP APPOINTMENT:       Discharge Instructions for Peripheral Nerve Block Patients    Your nerve block is expected to last between about 16-32 hours after surgery. This is an estimation as to how long your nerve block will last. Your nerve block may wear off earlier or may last longer. Taking Your Pain Medication:    If needed, your surgeon will give you a prescription for pain medication. Start taking this medication before the nerve block first begins to wear off or when you first begin to feel discomfort. The idea is to have pain medication in your body before the nerve block wears off. It takes about 60 minutes for the oral pain medication to become fully effective. Keep in mind that nerve blocks often wear off in the middle of the night. If you are going to bed and the block has not started to wear off or you have not had any discomfort, consider setting an alarm to go off in 2-3 hours so you can assess your block. If you notice the block is wearing off or you are starting to have discomfort you can then take your medication. You need to take your pain medication as prescribed. Pain medications can cause sedation and decrease your breathing if you take more than you need for the level of pain you are having. This effect can be exponentially worse if you have sleep apnea. Nausea is a common side effect of many pain medications. You may want to eat something before taking your pain medicine to help prevent nausea. What to expect after a nerve block  Nerve blocks affect many types of nerves, including nerves that control movement, pain, and normal sensation. Nerve blocks cause feelings such as:  numbness   tingling   heaviness   weakness or inability to move your arm or leg   a feeling that your arm or leg has fallen asleep. A nerve block can last for 2-36 hours or more depending on the medications used. Usually the weakness wears off first. The tingling and heaviness usually wear off next. Finally you may start to notice pain. Keep in mind that this may occur in any order. Once a nerve block starts to wear off it is usually completely gone within 60 minutes. Certain nerve blocks may cause other symptoms. If you have had a shoulder block or a block near your collar bone, you may have symptoms such as:  mild shortness of breath   a hoarse voice   blurry vision   unequal pupils   drooping of your face on the same side as the nerve block   Swelling at site of neck where block was placed   These are common side effects of this type of nerve block. These symptoms usually go away within 12-24 hours. If you have severe or prolonged shortness of breath, please go to the nearest Emergency Room  If you continue to feel the effects of the nerve block for longer than 48 hours, please call [facility] [phone] and ask to speak with the Anesthetist on call.     Protection of a Numb Arm or Leg  After a nerve block, you cannot feel pain, pressure, or extremes in temperature in the effected limb. Because your arm or leg is numb it is at risk for injury. For example, it is possible to burn your numb arm or leg on a hot stove without knowing it. Here are some helpful tips to protect your arm or leg while it is numb: While you are awake change position of your arm or leg often. This helps to avoid putting too much pressure on the limb for long periods of time. While sleeping, pad the limb with pillows to avoid rolling onto it while you sleep. If you have had a shoulder or arm block, it is a good idea to sleep in a recliner with pillows under your arm to avoid rolling onto your numb arm as you sleep. If you have a cast or tight dressing, check the color of your fingers/toes every couple of hours. Call your surgeon if any look discolored. If you have had a shoulder, arm, or hand block, you may go home with a sling. The sling will help to keep your arm in a safe position. Wear the sling at all times until the nerve block completely wears off. If you have had a leg block, you may have difficulty bearing weight on that leg. You may be sent home with crutches to use until the nerve block wears ff. Have someone assist you with walking until the nerve block completely wears off. Use caution in cold weather. Your numb leg or arm will not be able to feel extremes in temperature. Be sure to cover your limb appropriately before going outside in order to prevent frostbite.    Ask your family or other support people to help you with the above tips   [unfilled]

## 2023-01-04 NOTE — OP NOTE
Operative Note      Patient: Cindy Mean  YOB: 1980  MRN: [id number]    Date of Procedure: 1/4/2023    Pre-Op Diagnosis: TRIMALLEOLAR ANKLE FRACTURE LEFT ANKLE    Post-Op Diagnosis: Same       Procedure(s):  OPEN REDUCTION INTERNAL FIXATION TRIMALLEOLAR ANKLE FRACTURE without posterior lip fixation, left ankle  Open reduction internal fixation syndesmosis, left ankle  Stress radiographs, left ankle    Surgeon(s):  Yue Sloan DPM    Assistant:   Resident: Amparo Boyd DPM    Anesthesia: General    Estimated Blood Loss (mL): Minimal    Complications: None    Specimens:   * No specimens in log *    Implants:  Implant Name Type Inv. Item Serial No.  Lot No. LRB No. Used Action   SYSTEM IMPL TI UHMWPE KNOTLESS FOR SYNDESMOSIS FIX - [id number]  SYSTEM IMPL TI UHMWPE KNOTLESS FOR SYNDESMOSIS FIX  [employer] [facility] [id number] Left 1 Implanted   SCREW BNE L18MM DIA3. 5MM TI LO PROF FOR ANK FRAC SET - [id number]  SCREW BNE L18MM DIA3. 5MM TI LO PROF FOR ANK FRAC SET  [employer]  Left 1 Implanted   SCREW BNE L12MM DIA3. 5MM ANK TI LO PROF - [id number]  SCREW BNE L12MM DIA3. 5MM ANK TI LO PROF  [employer]-  Left 1 Implanted   SCREW BNE L52MM DIA4MM TI STEVEN PARTIALLY THRD LO PROF - [id number]  SCREW BNE L52MM DIA4MM TI STEVEN PARTIALLY THRD LO PROF  [employer]-  Left 1 Implanted   SCREW BNE L50MM DIA4MM TI STEVEN PARTIALLY THRD LO PROF - [id number]  SCREW BNE L50MM DIA4MM TI STEVEN PARTIALLY THRD LO PROF  [employer]-  Left 1 Implanted   SCREW BNE L14MM DIA3.5MM ERIK FT ANK TI DIANA LO PROF - [id number]  SCREW BNE L14MM DIA3.5MM ERIK FT ANK TI DIANA LO PROF  [employer]-  Left 1 Implanted   SCREW BONE L12MM DIA3. 5MM TI LCK LO PROF FOR ANK FX SYS - [id number]  SCREW BONE L12MM DIA3. 5MM TI LCK LO PROF FOR ANK FX SYS  [employer]  Left 1 Implanted   SCREW BNE L16MM DIA3MM FT ANK TI ROSA ANG DIANA LO PROF FOR - [id number]  SCREW BNE L16MM DIA3MM FT ANK TI ROSA STACIA CLARK PROF FOR  [employer]  Left 1 Implanted   SCREW BNE L10MM DIA3MM FT ANK TI ROSA ANG DIANA LO PROF FOR - [id number]  SCREW BNE L10MM DIA3MM FT ANK TI ROSA ANG DIANA LO PROF FOR  [employer]  Left 1 Implanted   SCREW BNE L14MM DIA3MM FT ANK TI ROSA ANG LCK LO PROF - [id number]  SCREW BNE L14MM DIA3MM FT ANK TI ROSA ANG LCK LO PROF  [employer]  Left 2 Implanted   PLATE BONE 5 H LT DSTL FIB TI LCK - [id number]  PLATE BONE 5 H LT DSTL FIB TI LCK  [employer]  Left 1 Implanted         Drains: * No LDAs found *    Findings: consistent with diagnosis     Detailed Description of Procedure:   NAME: Brenda Ceballos   MRN: [id number]    DATE: 01/04/23  AGE: 36 y.o. SURGEON:  Lavinia Beasley DPGRAY    ASSISTANTS:  Arabella Romano DPM PGY-3     PREOPERATIVE DIAGNOSIS:   1. Trimalleolar equivalent ankle fracture dislocation, left ankle. 2. Distal tibiofibular joint (syndesmosis) disruption, left ankle. POSTOPERATIVE DIAGNOSIS:  1. Trimalleolar equivalent ankle fracture dislocation, left ankle. 2. Distal tibiofibular joint (syndesmosis) disruption, left ankle. PROCEDURE PERFORMED:   1. Open reduction internal fixation of a trimalleolar equivalent ankle fracture dislocation,   left ankle. 2. Repair of the distal tibiofibular joint (syndesmosis), left ankle. INDICATIONS: This is a pleasant 65-year-old female who suffered a fall. At which time, she sustained a trimalleolar ankle fracture. She was seen in the emergency department and placed into a posterior splint. Due to the nature and instability of this ankle fracture, I   discussed with her the indication for ORIF. Preoperatively the risks, benefits, and   alternatives relative to the surgical procedure were discussed with the patient and  family. No guarentees were given. All questions were answered and the patient   requested to proceed with proposed procedure. OPERATIVE FINDINGS: Consistent with post operative diagnosis.      PROCEDURE: The patient was transfered from the preoperative holding area to the   operative suite and placed on the table in a prone position at which time popliteal and   saphenous blocks were administered per anesthesia. The patient was then transferred to a supine position. All monitors were applied. IV prophylaxis was obtained using 2 g ancef IV piggyback. A thigh tourniquet was then applied but not yet inflated. The left lower extremity was then prepped and draped in the normal sterile fashion. After   elevation of the left foot and ankle, the tourniquet was elevated to 300 mmHg. At this time, attention was then directed medially along the midline of the medial   malleolus where a linear incision was made with a #15 blade and carried through skin   and subcuticular tissues. Dissection was carried down to the level of the bone. Any   neurovascular structures were retracted and electrocauterized as necessary with the   exception of the greater saphenous vein which was retracted laterally at the incision site. The fracture site was identified and any soft tissue impingement and hematoma were resected. The medial malleolus fracture was then clamped and reduced using a point to point clamp. Adequate reduction was confirmed via c-arm. Next a k-wire was placed into the distal medial malleolus at the posterior colliculus and driven into the tibia. A second k-wire was placed starting at the anterior colliculus and driven into the fibular. The wires were overdrilled to the level of the fracture. Next 4.5 mm cannulated screws were measured an inserted per  guidelines. Adequate reduction and position were confirmed via c-arm. Attention was then directed to the lateral aspect of the left ankle where a linear incision   was made along the middle 1/3 of the fibula, slightly at the posterior aspect. The incision   was made with a #15 blade and deepened through skin and subcutaneous tissue.  Any   superficial venous bleeders were retracted and cauterized as necessary. Care was   taken to prevent any injury to any underlying neurovascular structures. Dissection was   then carried deep to the level of the periosteum. This showed disruption of the   periosteum and a hematoma was noted inside the fracture at the shaft of the fibula. The   fibula fracture was clamped and C-arm image intensifier was used to ensure that the   fibula was out to length and adequately reduced. A 3.5 lag screw was placed across the   fracture per standard AO technique. Next, it was felt that the best fixation would be   achieved with a five hole [employer] recon tubular plate that was bent appropriately to fit the   fibular fracture site and curved along the lateral aspect of the fibula proximal and distal to   the fracture site. Bone clamps were used to clamp the plate to the bone. Utilizing   standard AO technique locking and non locking screws were placed in the fibular plate, proximal and   distal to the fracture. The C-arm image intensifier was then used to ensure that we still   had adequate reduction of the fracture with no medial or posterior displacement, as well   as the fact that the fibula was out to length at the ankle joint. Attention was again laterally for syndesmotic fixation. It was determined that this would best be fixated   using an [employer]rex tight rope. The tightrope drill was used to cross the syndesmotic disruption parallel to the ankle joint, using live C-arm image intensifier. This was   followed by placement of an [employer] knot less tightrope per standard technique. A stress test was again performed with significant improvement in the diastasis between the tibia and fibula was noted with no instability or displacement. The wounds were flushed with copious amounts of normal sterile saline. The periosteum was closed using 3-0 Vicryl, deep tissue over the plate was then closed using a mix of 3-0 Vicryl laterally. Subcutaneous tissue was then closed using a 3-0 monocryl. The skin was then reapproximated with staples taking special precautions to make sure the skin edges were nicely everted along the medial and lateral incision sites. The tourniquet was deflated and a dressing was applied with xeroform fluff dressings, Sof-Rol and a modified Marie compression dressing with a   posterior splint secured with an Ace wrap with the left ankle in the neutral position. The patient tolerated the procedure and anesthesia well. The patient was transferred to the post anesthesia care unit with vital signs stable and vascular status intact to all digits. Intraoperative and postoperative disposition of the patient were discussed with   the family in the postoperative consultation area. Dr. Rai Harley was present and scrubbed for the entire case. Elements of the case which included but   were not limited to incision, dissection, preparation of site, implant, and closure were   performed by resident Dr. Jackie Flood under Dr. Hernesto Moran direct supervision. All critical elements of this   case were performed by Dr. Rai Harley. SURGERY START TIME: 12:16 pm.  SURGERY STOP TIME: 2:59 pm.    CONDITION: Stable. POSITION: Supine. DRAINS: None. PATHOLOGY: None. MATERIALS: see above,  3-0 Vicryl, 3-0 monocryl, staples     COMPLICATIONS: None. SPECIMENS: None. CPT Codes: 37234, [id number], 82412.       Aram Leo DPM PGY-3          Electronically signed by Aram Leo DPM on 1/4/2023 at 4:55 PM

## 2023-01-04 NOTE — PROGRESS NOTES
PODIATRIC PRE-OPERATIVE NOTE                                 SERVICE DATE: 1/4/2023    SERVICE TIME:  11:52 AM    DIAGNOSIS: Trimalleolar right ankle fracture    PROCEDURE(S): open reduction internal fixation right ankle fracture    Consent on chart: Yes    LABS:  CBC:  Lab Results   Component Value Date    WBC 8.0 12/30/2022    HGB 12.8 12/30/2022    HCT 39.7 12/30/2022    MCV 83.1 12/30/2022     12/30/2022       CMP:  Lab Results   Component Value Date/Time     12/30/2022 11:36 AM    K 3.4 12/30/2022 11:36 AM    CL 98 12/30/2022 11:36 AM    CO2 27 12/30/2022 11:36 AM    BUN 9 12/30/2022 11:36 AM    CREATININE 0.57 12/30/2022 11:36 AM    GLUCOSE 90 12/30/2022 11:36 AM    CALCIUM 9.2 12/30/2022 11:36 AM       COAGS:  Lab Results   Component Value Date    LABALBU 4.2 02/24/2021       URINALYSIS:  No components found for: UKET, NITRITES, SPGR, UPROT, LEUKEST, UWBC, UBACTERIA     Type & screen:  Yes    Medical Clearance:  Yes    CXR/EKG:  Yes    Medications/Preop Antibiotics: ancef     Allergies   Allergen Reactions    Latex      Added based on information entered during case entry, please review and add reactions, type, and severity as needed       Surgical site identified:  Yes    NPO:  Yes    IV Fluids:  Yes     Risks and benefits, complications, treatment options, expected outcome and rehabilitation explained,  patient understands. All questions were entertained and answered. Patient wishes to proceed with above procedure(s).     SIGNATURE: Keisha Uribe DPM PATIENT NAME: Christiano Sepulveda   DATE: January 4, 2023 MRN: [id number]   TIME: 11:52 AM PAGER: [phone]

## 2023-01-04 NOTE — ANESTHESIA PROCEDURE NOTES
Peripheral Block    Patient location during procedure: pre-op  Reason for block: post-op pain management and at surgeon's request  Start time: 1/4/2023 11:22 AM  End time: 1/4/2023 11:26 AM  Staffing  Performed: anesthesiologist   Anesthesiologist: Elio Regalado MD  Preanesthetic Checklist  Completed: patient identified, IV checked, site marked, risks and benefits discussed, surgical/procedural consents, equipment checked, pre-op evaluation, timeout performed, anesthesia consent given, oxygen available and monitors applied/VS acknowledged  Peripheral Block   Patient position: supine  Prep: ChloraPrep  Provider prep: mask and sterile gloves (Sterile probe cover)  Patient monitoring: cardiac monitor, continuous pulse ox, frequent blood pressure checks and IV access  Block type: Sciatic  Popliteal  Laterality: left  Injection technique: single-shot  Guidance: ultrasound guided  Local infiltration: ropivacaine and bupivacaine  Infiltration strength: 0.5 %  Local infiltration: ropivacaine and bupivacaine  Dose: 25 mL    Needle   Needle type: combined needle/nerve stimulator   Needle gauge: 21 G  Needle localization: anatomical landmarks and ultrasound guidance  Needle length: 10 cm  Assessment   Injection assessment: negative aspiration for heme, no paresthesia on injection and local visualized surrounding nerve on ultrasound  Paresthesia pain: immediately resolved  Slow fractionated injection: yes  Hemodynamics: stable  Real-time US image taken/store: yes    Additional Notes  Ultrasound image printed and saved in patient chart.     Sterile probe cover used

## 2023-01-04 NOTE — ANESTHESIA PROCEDURE NOTES
Peripheral Block    Patient location during procedure: pre-op  Reason for block: post-op pain management and at surgeon's request  Start time: 1/4/2023 11:18 AM  End time: 1/4/2023 11:21 AM  Staffing  Performed: anesthesiologist   Anesthesiologist: Kary Rosario MD  Preanesthetic Checklist  Completed: patient identified, IV checked, site marked, risks and benefits discussed, surgical/procedural consents, equipment checked, pre-op evaluation, timeout performed, anesthesia consent given, oxygen available and monitors applied/VS acknowledged  Peripheral Block   Patient position: supine  Prep: ChloraPrep  Provider prep: mask and sterile gloves (Sterile probe cover)  Patient monitoring: cardiac monitor, continuous pulse ox, frequent blood pressure checks and IV access  Block type: Saphenous  Laterality: left  Injection technique: single-shot  Guidance: ultrasound guided  Local infiltration: ropivacaine and bupivacaine  Infiltration strength: 0.5 %  Local infiltration: ropivacaine and bupivacaine  Dose: 15 mL    Needle   Needle type: combined needle/nerve stimulator   Needle gauge: 21 G  Needle localization: anatomical landmarks and ultrasound guidance  Needle length: 10 cm  Assessment   Injection assessment: negative aspiration for heme, no paresthesia on injection and local visualized surrounding nerve on ultrasound  Paresthesia pain: immediately resolved  Slow fractionated injection: yes  Hemodynamics: stable  Real-time US image taken/store: yes    Additional Notes  Ultrasound image printed and saved in patient chart.     Sterile probe cover used

## 2023-04-11 DIAGNOSIS — R58 ECCHYMOSIS: ICD-10-CM

## 2023-04-11 PROBLEM — F31.60 BIPOLAR AFFECTIVE DISORDER, CURRENT EPISODE MIXED, CURRENT EPISODE SEVERITY UNSPECIFIED (HCC): Status: ACTIVE | Noted: 2023-04-11

## 2023-04-11 LAB
APTT PPP: 30.5 SEC (ref 24.4–36.8)
BASOPHILS # BLD: 0.1 K/UL (ref 0–0.2)
BASOPHILS NFR BLD: 1.5 %
EOSINOPHIL # BLD: 0.2 K/UL (ref 0–0.7)
EOSINOPHIL NFR BLD: 2.9 %
ERYTHROCYTE [DISTWIDTH] IN BLOOD BY AUTOMATED COUNT: 16.2 % (ref 11.5–14.5)
HCT VFR BLD AUTO: 35.9 % (ref 37–47)
HGB BLD-MCNC: 11.5 G/DL (ref 12–16)
INR PPP: 1
LYMPHOCYTES # BLD: 2.1 K/UL (ref 1–4.8)
LYMPHOCYTES NFR BLD: 34.7 %
MCH RBC QN AUTO: 25.8 PG (ref 27–31.3)
MCHC RBC AUTO-ENTMCNC: 32 % (ref 33–37)
MCV RBC AUTO: 80.5 FL (ref 79.4–94.8)
MONOCYTES # BLD: 0.4 K/UL (ref 0.2–0.8)
MONOCYTES NFR BLD: 7.3 %
NEUTROPHILS # BLD: 3.3 K/UL (ref 1.4–6.5)
NEUTS SEG NFR BLD: 53.6 %
PLATELET # BLD AUTO: 328 K/UL (ref 130–400)
PROTHROMBIN TIME: 13.3 SEC (ref 12.3–14.9)
RBC # BLD AUTO: 4.46 M/UL (ref 4.2–5.4)
WBC # BLD AUTO: 6.1 K/UL (ref 4.8–10.8)

## 2023-04-26 DIAGNOSIS — N92.6 IRREGULAR PERIODS: Primary | ICD-10-CM

## 2023-05-16 ENCOUNTER — OFFICE VISIT (OUTPATIENT)
Dept: OBGYN CLINIC | Age: 43
End: 2023-05-16
Payer: COMMERCIAL

## 2023-05-16 VITALS
WEIGHT: 162 LBS | SYSTOLIC BLOOD PRESSURE: 136 MMHG | HEIGHT: 63 IN | BODY MASS INDEX: 28.7 KG/M2 | DIASTOLIC BLOOD PRESSURE: 82 MMHG

## 2023-05-16 DIAGNOSIS — Z12.31 ENCOUNTER FOR SCREENING MAMMOGRAM FOR MALIGNANT NEOPLASM OF BREAST: ICD-10-CM

## 2023-05-16 DIAGNOSIS — N93.8 DUB (DYSFUNCTIONAL UTERINE BLEEDING): Primary | ICD-10-CM

## 2023-05-16 PROCEDURE — 1036F TOBACCO NON-USER: CPT | Performed by: OBSTETRICS & GYNECOLOGY

## 2023-05-16 PROCEDURE — 99202 OFFICE O/P NEW SF 15 MIN: CPT | Performed by: OBSTETRICS & GYNECOLOGY

## 2023-05-16 PROCEDURE — G8427 DOCREV CUR MEDS BY ELIG CLIN: HCPCS | Performed by: OBSTETRICS & GYNECOLOGY

## 2023-05-16 PROCEDURE — G8419 CALC BMI OUT NRM PARAM NOF/U: HCPCS | Performed by: OBSTETRICS & GYNECOLOGY

## 2023-05-16 RX ORDER — NALTREXONE HYDROCHLORIDE 50 MG/1
50 TABLET, FILM COATED ORAL DAILY
COMMUNITY

## 2023-05-16 RX ORDER — DEXTROAMPHETAMINE SACCHARATE, AMPHETAMINE ASPARTATE MONOHYDRATE, DEXTROAMPHETAMINE SULFATE AND AMPHETAMINE SULFATE 3.75; 3.75; 3.75; 3.75 MG/1; MG/1; MG/1; MG/1
15 CAPSULE, EXTENDED RELEASE ORAL EVERY MORNING
COMMUNITY

## 2023-05-16 RX ORDER — CELECOXIB 200 MG/1
200 CAPSULE ORAL 2 TIMES DAILY
Qty: 60 CAPSULE | Refills: 0 | COMMUNITY
Start: 2023-05-02 | End: 2023-06-01

## 2023-05-16 ASSESSMENT — ENCOUNTER SYMPTOMS
APNEA: 0
SHORTNESS OF BREATH: 0
ABDOMINAL PAIN: 0

## 2023-05-17 NOTE — PROGRESS NOTES
Subjective:      Patient ID:  Scarlett Lee is a 39 y.o. female with chief complaint of:  Chief Complaint   Patient presents with    New Patient     Pt had two periods last month, his month was normal. Pt did have surgery on her ankle in January and has been on a lot of medications so she is not sure if that has anything to do with it. Patient presents to discuss issues related to regular menstrual bleeding. She admits that she had had two previous missed cycles followed by a recent prolonged bleed lasting two week. s she had not had previous history of irregular bleeding. Her last delivery was 6 yrs ago paps are normal. No pain with intercourse no hot flashes or night sweats and unsure of menopause for family   [place] has no risk factors is currently not on birth control      Past Medical History:   Diagnosis Date    ADHD     HTN (hypertension)      Past Surgical History:   Procedure Laterality Date    ANKLE FRACTURE SURGERY Left 1/4/2023    OPEN REDUCTION INTERNAL FIXATION TRIMALLEOLAR ANKLE FRACTURE performed by Lukasz Nielsen DPM at [address] [place] [address]      2004, 2016    TONSILLECTOMY AND ADENOIDECTOMY       Family History   Problem Relation Age of Onset    High Blood Pressure Mother     Diabetes Maternal Uncle     Diabetes Maternal Grandfather     Diabetes Maternal Uncle      Current Outpatient Medications on File Prior to Visit   Medication Sig Dispense Refill    naltrexone (DEPADE) 50 MG tablet Take 1 tablet by mouth daily      celecoxib (CELEBREX) 200 MG capsule Take 1 capsule by mouth 2 times daily (Patient not taking: Reported on 5/16/2023) 60 capsule 0    amphetamine-dextroamphetamine (ADDERALL XR) 15 MG extended release capsule Take 1 capsule by mouth every morning. Max Daily Amount: 15 mg (Patient not taking: Reported on 5/16/2023)       No current facility-administered medications on file prior to visit.      Allergies:  Latex    Review of Systems   Constitutional:

## 2023-05-30 ENCOUNTER — HOSPITAL ENCOUNTER (OUTPATIENT)
Dept: ULTRASOUND IMAGING | Age: 43
Discharge: HOME | End: 2023-06-01
Payer: COMMERCIAL

## 2023-05-30 ENCOUNTER — HOSPITAL ENCOUNTER (OUTPATIENT)
Dept: WOMENS IMAGING | Age: 43
Discharge: HOME | End: 2023-06-01
Payer: COMMERCIAL

## 2023-05-30 VITALS — HEIGHT: 65 IN | BODY MASS INDEX: 26.96 KG/M2

## 2023-05-30 DIAGNOSIS — N93.8 DUB (DYSFUNCTIONAL UTERINE BLEEDING): ICD-10-CM

## 2023-05-30 DIAGNOSIS — Z12.31 ENCOUNTER FOR SCREENING MAMMOGRAM FOR MALIGNANT NEOPLASM OF BREAST: ICD-10-CM

## 2023-05-30 PROCEDURE — 77063 BREAST TOMOSYNTHESIS BI: CPT

## 2023-05-30 PROCEDURE — 76830 TRANSVAGINAL US NON-OB: CPT

## 2023-05-30 PROCEDURE — 76856 US EXAM PELVIC COMPLETE: CPT

## 2023-05-30 PROCEDURE — 93975 VASCULAR STUDY: CPT

## 2023-06-07 ENCOUNTER — OFFICE VISIT (OUTPATIENT)
Dept: OBGYN CLINIC | Age: 43
End: 2023-06-07
Payer: COMMERCIAL

## 2023-06-07 VITALS
HEIGHT: 65 IN | WEIGHT: 161 LBS | BODY MASS INDEX: 26.82 KG/M2 | DIASTOLIC BLOOD PRESSURE: 98 MMHG | SYSTOLIC BLOOD PRESSURE: 146 MMHG

## 2023-06-07 DIAGNOSIS — N93.8 DUB (DYSFUNCTIONAL UTERINE BLEEDING): Primary | ICD-10-CM

## 2023-06-07 PROCEDURE — 99212 OFFICE O/P EST SF 10 MIN: CPT | Performed by: OBSTETRICS & GYNECOLOGY

## 2023-06-07 PROCEDURE — G8419 CALC BMI OUT NRM PARAM NOF/U: HCPCS | Performed by: OBSTETRICS & GYNECOLOGY

## 2023-06-07 PROCEDURE — 1036F TOBACCO NON-USER: CPT | Performed by: OBSTETRICS & GYNECOLOGY

## 2023-06-07 PROCEDURE — G8427 DOCREV CUR MEDS BY ELIG CLIN: HCPCS | Performed by: OBSTETRICS & GYNECOLOGY

## 2023-06-07 RX ORDER — DEXTROAMPHETAMINE SACCHARATE, AMPHETAMINE ASPARTATE, DEXTROAMPHETAMINE SULFATE AND AMPHETAMINE SULFATE 5; 5; 5; 5 MG/1; MG/1; MG/1; MG/1
1 TABLET ORAL 2 TIMES DAILY
COMMUNITY
Start: 2023-05-26

## 2023-06-07 RX ORDER — TRAZODONE HYDROCHLORIDE 50 MG/1
TABLET ORAL
COMMUNITY
Start: 2023-05-26

## 2023-06-07 NOTE — PROGRESS NOTES
Subjective:      Patient ID:  Oneyda Mo is a 39 y.o. female with chief complaint of:  Chief Complaint   Patient presents with    Results       Patient presents today for review of recent pelvic US due to recent irregular bleeding for one month patient has had no further bleeding since may 9th. No pelvic pain or other complaints spoke ofn the use of hormonal meds for both cycle control and pregnancy prevention. Pt will follow up is symptoms return      Past Medical History:   Diagnosis Date    ADHD     Breast cyst     HTN (hypertension)      Past Surgical History:   Procedure Laterality Date    ANKLE FRACTURE SURGERY Left 1/4/2023    OPEN REDUCTION INTERNAL FIXATION TRIMALLEOLAR ANKLE FRACTURE performed by Jarett Prince DPM at [facility] [address]      2004, 2016    TONSILLECTOMY AND ADENOIDECTOMY       Family History   Problem Relation Age of Onset    High Blood Pressure Mother     Diabetes Maternal Uncle     Diabetes Maternal Grandfather     Diabetes Maternal Uncle      Current Outpatient Medications on File Prior to Visit   Medication Sig Dispense Refill    traZODone (DESYREL) 50 MG tablet TAKE 1 TO 2 TABLETS BY MOUTH AT BEDTIME AS NEEDED FOR SLEEP      amphetamine-dextroamphetamine (ADDERALL) 20 MG tablet Take 1 tablet by mouth 2 times daily. naltrexone (DEPADE) 50 MG tablet Take 1 tablet by mouth daily      celecoxib (CELEBREX) 200 MG capsule Take 1 capsule by mouth 2 times daily (Patient not taking: Reported on 5/16/2023) 60 capsule 0    amphetamine-dextroamphetamine (ADDERALL XR) 15 MG extended release capsule Take 1 capsule by mouth every morning. Max Daily Amount: 15 mg (Patient not taking: Reported on 5/16/2023)       No current facility-administered medications on file prior to visit. Allergies:  Latex    Review of Systems   All other systems reviewed and are negative.     Objective:   BP (!) 146/98   Ht 5' 5\" (1.651 m)   Wt 161 lb (73 kg)   LMP 05/09/2023

## 2023-08-24 DIAGNOSIS — A60.00 GENITAL HERPES SIMPLEX, UNSPECIFIED SITE: ICD-10-CM

## 2023-08-25 RX ORDER — VALACYCLOVIR HYDROCHLORIDE 1 G/1
TABLET, FILM COATED ORAL
Qty: 50 TABLET | Refills: 3 | Status: SHIPPED | OUTPATIENT
Start: 2023-08-25

## 2023-09-19 ENCOUNTER — OFFICE VISIT (OUTPATIENT)
Dept: OBGYN CLINIC | Age: 43
End: 2023-09-19
Payer: COMMERCIAL

## 2023-09-19 VITALS
SYSTOLIC BLOOD PRESSURE: 138 MMHG | WEIGHT: 157 LBS | DIASTOLIC BLOOD PRESSURE: 86 MMHG | HEIGHT: 65 IN | BODY MASS INDEX: 26.16 KG/M2

## 2023-09-19 DIAGNOSIS — Z30.09 ENCOUNTER FOR COUNSELING REGARDING CONTRACEPTION: ICD-10-CM

## 2023-09-19 DIAGNOSIS — N92.0 MENORRHAGIA WITH REGULAR CYCLE: Primary | ICD-10-CM

## 2023-09-19 PROCEDURE — 1036F TOBACCO NON-USER: CPT | Performed by: OBSTETRICS & GYNECOLOGY

## 2023-09-19 PROCEDURE — G8419 CALC BMI OUT NRM PARAM NOF/U: HCPCS | Performed by: OBSTETRICS & GYNECOLOGY

## 2023-09-19 PROCEDURE — 99212 OFFICE O/P EST SF 10 MIN: CPT | Performed by: OBSTETRICS & GYNECOLOGY

## 2023-09-19 PROCEDURE — G8427 DOCREV CUR MEDS BY ELIG CLIN: HCPCS | Performed by: OBSTETRICS & GYNECOLOGY

## 2023-09-20 ASSESSMENT — ENCOUNTER SYMPTOMS
ABDOMINAL PAIN: 1
SHORTNESS OF BREATH: 0
APNEA: 0

## 2023-09-20 NOTE — PROGRESS NOTES
Subjective:      Patient ID:  Silvia Arauz is a 39 y.o. female with chief complaint of:  Chief Complaint   Patient presents with    Contraception     Pt here to discuss birth control, she does not want pills, and skeptical about the depo. Really wants to discuss tubal        Patient presents for management of menses and contraceptive protection. Patient desires to consider medication that can control bleeding but does not want ocp. She desires information of mirena would consider ablation        Past Medical History:   Diagnosis Date    ADHD     Breast cyst     HTN (hypertension)      Past Surgical History:   Procedure Laterality Date    ANKLE FRACTURE SURGERY Left 1/4/2023    OPEN REDUCTION INTERNAL FIXATION TRIMALLEOLAR ANKLE FRACTURE performed by Radha Yan DPM at [facility]      2004, 2016    TONSILLECTOMY AND ADENOIDECTOMY       Family History   Problem Relation Age of Onset    High Blood Pressure Mother     Diabetes Maternal Uncle     Diabetes Maternal Grandfather     Diabetes Maternal Uncle      Current Outpatient Medications on File Prior to Visit   Medication Sig Dispense Refill    valACYclovir (VALTREX) 1 g tablet 1 g once daily for 5 days when you get an outbreak of herpes 50 tablet 3    traZODone (DESYREL) 50 MG tablet TAKE 1 TO 2 TABLETS BY MOUTH AT BEDTIME AS NEEDED FOR SLEEP      amphetamine-dextroamphetamine (ADDERALL) 20 MG tablet Take 1 tablet by mouth 2 times daily. naltrexone (DEPADE) 50 MG tablet Take 1 tablet by mouth daily      celecoxib (CELEBREX) 200 MG capsule Take 1 capsule by mouth 2 times daily (Patient not taking: Reported on 5/16/2023) 60 capsule 0    amphetamine-dextroamphetamine (ADDERALL XR) 15 MG extended release capsule Take 1 capsule by mouth every morning. Max Daily Amount: 15 mg (Patient not taking: Reported on 9/19/2023)       No current facility-administered medications on file prior to visit.      Allergies:  Latex    Review

## 2023-11-16 ENCOUNTER — HOSPITAL ENCOUNTER (OUTPATIENT)
Dept: PREADMISSION TESTING | Age: 43
Discharge: HOME | End: 2023-11-20
Payer: COMMERCIAL

## 2023-11-16 VITALS
TEMPERATURE: 98.5 F | HEART RATE: 90 BPM | RESPIRATION RATE: 16 BRPM | DIASTOLIC BLOOD PRESSURE: 114 MMHG | BODY MASS INDEX: 24.2 KG/M2 | OXYGEN SATURATION: 98 % | WEIGHT: 154.2 LBS | SYSTOLIC BLOOD PRESSURE: 172 MMHG | HEIGHT: 67 IN

## 2023-11-16 DIAGNOSIS — T84.84XA PAINFUL ORTHOPAEDIC HARDWARE (HCC): ICD-10-CM

## 2023-11-16 DIAGNOSIS — M65.9 SYNOVITIS OF LEFT ANKLE: ICD-10-CM

## 2023-11-16 PROBLEM — I10 HYPERTENSION: Chronic | Status: ACTIVE | Noted: 2023-11-16

## 2023-11-16 PROBLEM — M65.972 SYNOVITIS OF LEFT ANKLE: Status: ACTIVE | Noted: 2023-11-16

## 2023-11-16 LAB
ANION GAP SERPL CALCULATED.3IONS-SCNC: 10 MEQ/L (ref 9–15)
BUN SERPL-MCNC: 10 MG/DL (ref 6–20)
CALCIUM SERPL-MCNC: 8.9 MG/DL (ref 8.5–9.9)
CHLORIDE SERPL-SCNC: 99 MEQ/L (ref 95–107)
CO2 SERPL-SCNC: 28 MEQ/L (ref 20–31)
CREAT SERPL-MCNC: 0.73 MG/DL (ref 0.5–0.9)
ERYTHROCYTE [DISTWIDTH] IN BLOOD BY AUTOMATED COUNT: 14.1 % (ref 11.5–14.5)
GLUCOSE SERPL-MCNC: 94 MG/DL (ref 70–99)
HCT VFR BLD AUTO: 41.3 % (ref 37–47)
HGB BLD-MCNC: 13.1 G/DL (ref 12–16)
MCH RBC QN AUTO: 28.3 PG (ref 27–31.3)
MCHC RBC AUTO-ENTMCNC: 31.7 % (ref 33–37)
MCV RBC AUTO: 89.2 FL (ref 79.4–94.8)
PLATELET # BLD AUTO: 265 K/UL (ref 130–400)
POTASSIUM SERPL-SCNC: 3.8 MEQ/L (ref 3.4–4.9)
RBC # BLD AUTO: 4.63 M/UL (ref 4.2–5.4)
SODIUM SERPL-SCNC: 137 MEQ/L (ref 135–144)
WBC # BLD AUTO: 7.4 K/UL (ref 4.8–10.8)

## 2023-11-16 PROCEDURE — 93005 ELECTROCARDIOGRAM TRACING: CPT

## 2023-11-16 PROCEDURE — 80048 BASIC METABOLIC PNL TOTAL CA: CPT

## 2023-11-16 PROCEDURE — 85027 COMPLETE CBC AUTOMATED: CPT

## 2023-11-16 RX ORDER — SODIUM CHLORIDE 0.9 % (FLUSH) 0.9 %
5-40 SYRINGE (ML) INJECTION PRN
OUTPATIENT
Start: 2023-11-27

## 2023-11-16 RX ORDER — SODIUM CHLORIDE 9 MG/ML
INJECTION, SOLUTION INTRAVENOUS PRN
OUTPATIENT
Start: 2023-11-27

## 2023-11-16 RX ORDER — LIDOCAINE HYDROCHLORIDE 10 MG/ML
1 INJECTION, SOLUTION EPIDURAL; INFILTRATION; INTRACAUDAL; PERINEURAL
OUTPATIENT
Start: 2023-11-27 | End: 2023-11-28

## 2023-11-16 RX ORDER — SODIUM CHLORIDE 0.9 % (FLUSH) 0.9 %
5-40 SYRINGE (ML) INJECTION EVERY 12 HOURS SCHEDULED
OUTPATIENT
Start: 2023-11-27

## 2023-11-16 ASSESSMENT — ENCOUNTER SYMPTOMS
EYE DISCHARGE: 0
CONSTIPATION: 0
SINUS PAIN: 0
BACK PAIN: 0
EYE ITCHING: 0
NAUSEA: 0
EYE PAIN: 0
SORE THROAT: 0
BLOOD IN STOOL: 0
SINUS PRESSURE: 0
ABDOMINAL PAIN: 0
ABDOMINAL DISTENTION: 0
RHINORRHEA: 0
CHEST TIGHTNESS: 0
PHOTOPHOBIA: 0
COUGH: 1
ALLERGIC/IMMUNOLOGIC NEGATIVE: 1
WHEEZING: 0
DIARRHEA: 0
TROUBLE SWALLOWING: 0
FACIAL SWELLING: 0
SHORTNESS OF BREATH: 0
VOMITING: 0
EYE REDNESS: 0

## 2023-11-16 NOTE — H&P
Preoperative Consultation      Name: Do Reed  YOB: 1980  Date of Service: 11/16/2023  PCP: Phyllis Whittington MD    CHIEF COMPLAINT:  painful orthopaedic hardware, synovitis of left ankle     HISTORY OF PRESENT ILLNESS:      The patient is a 39 y.o. female with significant past medical history of painful orthopaedic hardware, synovitis of left ankle, HTN, bipolar who presents for a preoperative consultation at the request of surgeon, Dr. Kalie Fuller, who plans on performing removal of hardware left ankle arthroscopy synovectomy left ankle on 11/27/23 at [place]. Pt reports she had surgery on her left ankle in Jan 2023 (pt s/p open reduction internal fixation for trimalleolar ankle fracture). Pt reports she has been having pain in her left ankle and foot for the past 3 months. Pt reports if she is walking for long periods of time or overuses her foot she has pain. She denies pain today. Pt reports she also has pain in her right foot from using the right foot to take pressure off the left foot. Pt reports she has supportive shoes that she wears at home. She reports she has tried custom orthotics, bracing, and physical therapy. Pt denies taking any medications for pain. Pt denies numbness or tingling. Pt reports she has limited range of motion of her left ankle. Pt has hx of HTN but reports she is not currently taking blood pressure medication. Pt was prescribed norvasc but reports she does not take them regularly. BP today 172/114. Pt reports \"I know I need to take my blood pressure medication I will take them\". Pt denies headaches, vision changes, lightheadedness, dizziness, chest pain, palpitations, leg swelling, shortness of breath. Pt reports she was told in the past she had an abnormality in her EKG and was referred to cardiology but never made apt. EKG ordered today. Apt made for pt to see PCP for medical clearance on 11/21/23 at 1230. Pt aware of apt and plan.      Smoking hx: current

## 2023-11-17 LAB
EKG ATRIAL RATE: 92 BPM
EKG P AXIS: 55 DEGREES
EKG P-R INTERVAL: 156 MS
EKG Q-T INTERVAL: 364 MS
EKG QRS DURATION: 74 MS
EKG QTC CALCULATION (BAZETT): 450 MS
EKG R AXIS: 17 DEGREES
EKG T AXIS: 30 DEGREES
EKG VENTRICULAR RATE: 92 BPM

## 2023-11-17 PROCEDURE — 93010 ELECTROCARDIOGRAM REPORT: CPT | Performed by: INTERNAL MEDICINE

## 2023-11-21 ENCOUNTER — OFFICE VISIT (OUTPATIENT)
Dept: PRIMARY CARE CLINIC | Age: 43
End: 2023-11-21
Payer: COMMERCIAL

## 2023-11-21 VITALS
HEIGHT: 67 IN | SYSTOLIC BLOOD PRESSURE: 138 MMHG | OXYGEN SATURATION: 100 % | WEIGHT: 158.2 LBS | TEMPERATURE: 97.1 F | RESPIRATION RATE: 18 BRPM | DIASTOLIC BLOOD PRESSURE: 78 MMHG | BODY MASS INDEX: 24.83 KG/M2 | HEART RATE: 91 BPM

## 2023-11-21 DIAGNOSIS — I15.9 SECONDARY HYPERTENSION: Primary | Chronic | ICD-10-CM

## 2023-11-21 PROBLEM — F90.9 ADULT ATTENTION DEFICIT HYPERACTIVITY DISORDER: Status: ACTIVE | Noted: 2023-04-12

## 2023-11-21 LAB — HBA1C MFR BLD: 5.1 %

## 2023-11-21 PROCEDURE — 4004F PT TOBACCO SCREEN RCVD TLK: CPT | Performed by: INTERNAL MEDICINE

## 2023-11-21 PROCEDURE — 99213 OFFICE O/P EST LOW 20 MIN: CPT | Performed by: INTERNAL MEDICINE

## 2023-11-21 PROCEDURE — 83036 HEMOGLOBIN GLYCOSYLATED A1C: CPT | Performed by: INTERNAL MEDICINE

## 2023-11-21 PROCEDURE — G8484 FLU IMMUNIZE NO ADMIN: HCPCS | Performed by: INTERNAL MEDICINE

## 2023-11-21 PROCEDURE — G8427 DOCREV CUR MEDS BY ELIG CLIN: HCPCS | Performed by: INTERNAL MEDICINE

## 2023-11-21 PROCEDURE — 3078F DIAST BP <80 MM HG: CPT | Performed by: INTERNAL MEDICINE

## 2023-11-21 PROCEDURE — 3075F SYST BP GE 130 - 139MM HG: CPT | Performed by: INTERNAL MEDICINE

## 2023-11-21 PROCEDURE — G8419 CALC BMI OUT NRM PARAM NOF/U: HCPCS | Performed by: INTERNAL MEDICINE

## 2023-11-21 RX ORDER — LOSARTAN POTASSIUM 25 MG/1
25 TABLET ORAL DAILY
Qty: 90 TABLET | Refills: 3 | Status: SHIPPED | OUTPATIENT
Start: 2023-11-21

## 2023-11-21 RX ORDER — AMLODIPINE BESYLATE 10 MG/1
10 TABLET ORAL DAILY
Qty: 90 TABLET | Refills: 3 | Status: SHIPPED | OUTPATIENT
Start: 2023-11-21

## 2023-11-21 ASSESSMENT — ENCOUNTER SYMPTOMS
COUGH: 0
ABDOMINAL PAIN: 0
DIARRHEA: 0
WHEEZING: 0
NAUSEA: 0
VOMITING: 0
SHORTNESS OF BREATH: 0

## 2023-11-21 NOTE — PROGRESS NOTES
Subjective:      Patient ID: Wale Bonner is a 39 y.o. female    Hypertension f/u  HPI  Pt presents for hypertension management. Blood pressure elevated  during preop testing. Already resume old pills of losartan and amlodipine. NOw wants refills of both. No dizziness, no CP, no SOB, slurred speech or limb weakess. Past Medical History:   Diagnosis Date    ADHD     Breast cyst     HTN (hypertension)     Synovitis of left ankle 11/16/2023     Past Surgical History:   Procedure Laterality Date    ANKLE FRACTURE SURGERY Left 01/04/2023    OPEN REDUCTION INTERNAL FIXATION TRIMALLEOLAR ANKLE FRACTURE performed by Kaitlin Chapa DPM at [facility]      2004, 2016    TONSILLECTOMY AND ADENOIDECTOMY       Social History     Socioeconomic History    Marital status:      Spouse name: Not on file    Number of children: Not on file    Years of education: Not on file    Highest education level: Not on file   Occupational History    Not on file   Tobacco Use    Smoking status: Every Day     Types: Cigars    Smokeless tobacco: Never    Tobacco comments:     1 cigar/day   Vaping Use    Vaping Use: Never used   Substance and Sexual Activity    Alcohol use: Yes     Comment: weekly    Drug use: Never    Sexual activity: Yes   Other Topics Concern    Not on file   Social History Narrative    Not on file     Social Determinants of Health     Financial Resource Strain: Low Risk  (4/11/2023)    Overall Financial Resource Strain (CARDIA)     Difficulty of Paying Living Expenses: Not hard at all   Food Insecurity: No Food Insecurity (4/11/2023)    Hunger Vital Sign     Worried About Running Out of Food in the Last Year: Never true     [address] in the Last Year: Never true   Transportation Needs: Unknown (4/11/2023)    PRAPARE - Transportation     Lack of Transportation (Medical): Not on file     Lack of Transportation (Non-Medical):  No   Physical Activity: Not on file   Stress: Not on

## 2023-11-26 RX ORDER — SODIUM CHLORIDE 0.9 % (FLUSH) 0.9 %
5-40 SYRINGE (ML) INJECTION EVERY 12 HOURS SCHEDULED
Status: CANCELLED | OUTPATIENT
Start: 2023-11-26

## 2023-11-26 RX ORDER — ONDANSETRON 2 MG/ML
4 INJECTION INTRAMUSCULAR; INTRAVENOUS EVERY 6 HOURS PRN
Status: CANCELLED | OUTPATIENT
Start: 2023-11-26

## 2023-11-26 RX ORDER — SODIUM CHLORIDE 0.9 % (FLUSH) 0.9 %
5-40 SYRINGE (ML) INJECTION PRN
Status: CANCELLED | OUTPATIENT
Start: 2023-11-26

## 2023-11-26 RX ORDER — ONDANSETRON 4 MG/1
4 TABLET, ORALLY DISINTEGRATING ORAL EVERY 8 HOURS PRN
Status: CANCELLED | OUTPATIENT
Start: 2023-11-26

## 2023-11-26 RX ORDER — SODIUM CHLORIDE 9 MG/ML
INJECTION, SOLUTION INTRAVENOUS PRN
Status: CANCELLED | OUTPATIENT
Start: 2023-11-26

## 2023-11-27 ENCOUNTER — ANESTHESIA EVENT (OUTPATIENT)
Dept: OPERATING ROOM | Age: 43
End: 2023-11-27
Payer: COMMERCIAL

## 2023-11-27 ENCOUNTER — APPOINTMENT (OUTPATIENT)
Dept: GENERAL RADIOLOGY | Age: 43
End: 2023-11-27
Attending: STUDENT IN AN ORGANIZED HEALTH CARE EDUCATION/TRAINING PROGRAM
Payer: COMMERCIAL

## 2023-11-27 ENCOUNTER — HOSPITAL ENCOUNTER (OUTPATIENT)
Age: 43
Setting detail: OUTPATIENT SURGERY
Discharge: HOME | End: 2023-11-27
Attending: STUDENT IN AN ORGANIZED HEALTH CARE EDUCATION/TRAINING PROGRAM | Admitting: STUDENT IN AN ORGANIZED HEALTH CARE EDUCATION/TRAINING PROGRAM
Payer: COMMERCIAL

## 2023-11-27 ENCOUNTER — ANESTHESIA (OUTPATIENT)
Dept: OPERATING ROOM | Age: 43
End: 2023-11-27
Payer: COMMERCIAL

## 2023-11-27 VITALS
HEIGHT: 66 IN | RESPIRATION RATE: 16 BRPM | OXYGEN SATURATION: 100 % | WEIGHT: 154.2 LBS | HEART RATE: 75 BPM | TEMPERATURE: 98.5 F | DIASTOLIC BLOOD PRESSURE: 97 MMHG | SYSTOLIC BLOOD PRESSURE: 158 MMHG | BODY MASS INDEX: 24.78 KG/M2

## 2023-11-27 DIAGNOSIS — G89.18 POST-OP PAIN: Primary | ICD-10-CM

## 2023-11-27 LAB
HCG, URINE, POC: NEGATIVE
Lab: NORMAL
NEGATIVE QC PASS/FAIL: NORMAL
POSITIVE QC PASS/FAIL: NORMAL

## 2023-11-27 PROCEDURE — 2709999900 HC NON-CHARGEABLE SUPPLY: Performed by: STUDENT IN AN ORGANIZED HEALTH CARE EDUCATION/TRAINING PROGRAM

## 2023-11-27 PROCEDURE — 6360000002 HC RX W HCPCS: Performed by: ANESTHESIOLOGIST ASSISTANT

## 2023-11-27 PROCEDURE — 2580000003 HC RX 258: Performed by: STUDENT IN AN ORGANIZED HEALTH CARE EDUCATION/TRAINING PROGRAM

## 2023-11-27 PROCEDURE — 3700000001 HC ADD 15 MINUTES (ANESTHESIA): Performed by: STUDENT IN AN ORGANIZED HEALTH CARE EDUCATION/TRAINING PROGRAM

## 2023-11-27 PROCEDURE — 7100000010 HC PHASE II RECOVERY - FIRST 15 MIN: Performed by: STUDENT IN AN ORGANIZED HEALTH CARE EDUCATION/TRAINING PROGRAM

## 2023-11-27 PROCEDURE — 7100000011 HC PHASE II RECOVERY - ADDTL 15 MIN: Performed by: STUDENT IN AN ORGANIZED HEALTH CARE EDUCATION/TRAINING PROGRAM

## 2023-11-27 PROCEDURE — 3600000014 HC SURGERY LEVEL 4 ADDTL 15MIN: Performed by: STUDENT IN AN ORGANIZED HEALTH CARE EDUCATION/TRAINING PROGRAM

## 2023-11-27 PROCEDURE — 2580000003 HC RX 258

## 2023-11-27 PROCEDURE — 2580000003 HC RX 258: Performed by: ANESTHESIOLOGIST ASSISTANT

## 2023-11-27 PROCEDURE — 6360000002 HC RX W HCPCS: Performed by: ANESTHESIOLOGY

## 2023-11-27 PROCEDURE — 64445 NJX AA&/STRD SCIATIC NRV IMG: CPT | Performed by: ANESTHESIOLOGY

## 2023-11-27 PROCEDURE — 64447 NJX AA&/STRD FEMORAL NRV IMG: CPT | Performed by: ANESTHESIOLOGY

## 2023-11-27 PROCEDURE — 3700000000 HC ANESTHESIA ATTENDED CARE: Performed by: STUDENT IN AN ORGANIZED HEALTH CARE EDUCATION/TRAINING PROGRAM

## 2023-11-27 PROCEDURE — 3600000004 HC SURGERY LEVEL 4 BASE: Performed by: STUDENT IN AN ORGANIZED HEALTH CARE EDUCATION/TRAINING PROGRAM

## 2023-11-27 PROCEDURE — 6360000002 HC RX W HCPCS

## 2023-11-27 RX ORDER — ONDANSETRON 2 MG/ML
4 INJECTION INTRAMUSCULAR; INTRAVENOUS
Status: DISCONTINUED | OUTPATIENT
Start: 2023-11-27 | End: 2023-11-27 | Stop reason: HOSPADM

## 2023-11-27 RX ORDER — METOCLOPRAMIDE HYDROCHLORIDE 5 MG/ML
10 INJECTION INTRAMUSCULAR; INTRAVENOUS
Status: DISCONTINUED | OUTPATIENT
Start: 2023-11-27 | End: 2023-11-27 | Stop reason: HOSPADM

## 2023-11-27 RX ORDER — SODIUM CHLORIDE 0.9 % (FLUSH) 0.9 %
5-40 SYRINGE (ML) INJECTION EVERY 12 HOURS SCHEDULED
Status: DISCONTINUED | OUTPATIENT
Start: 2023-11-27 | End: 2023-11-27 | Stop reason: HOSPADM

## 2023-11-27 RX ORDER — ONDANSETRON 2 MG/ML
INJECTION INTRAMUSCULAR; INTRAVENOUS PRN
Status: DISCONTINUED | OUTPATIENT
Start: 2023-11-27 | End: 2023-11-27 | Stop reason: SDUPTHER

## 2023-11-27 RX ORDER — FENTANYL CITRATE 0.05 MG/ML
25 INJECTION, SOLUTION INTRAMUSCULAR; INTRAVENOUS EVERY 5 MIN PRN
Status: DISCONTINUED | OUTPATIENT
Start: 2023-11-27 | End: 2023-11-27 | Stop reason: HOSPADM

## 2023-11-27 RX ORDER — MIDAZOLAM HYDROCHLORIDE 1 MG/ML
INJECTION INTRAMUSCULAR; INTRAVENOUS PRN
Status: DISCONTINUED | OUTPATIENT
Start: 2023-11-27 | End: 2023-11-27 | Stop reason: SDUPTHER

## 2023-11-27 RX ORDER — SODIUM CHLORIDE 9 MG/ML
INJECTION, SOLUTION INTRAVENOUS PRN
Status: DISCONTINUED | OUTPATIENT
Start: 2023-11-27 | End: 2023-11-27 | Stop reason: HOSPADM

## 2023-11-27 RX ORDER — HYDRALAZINE HYDROCHLORIDE 20 MG/ML
10 INJECTION INTRAMUSCULAR; INTRAVENOUS
Status: DISCONTINUED | OUTPATIENT
Start: 2023-11-27 | End: 2023-11-27 | Stop reason: HOSPADM

## 2023-11-27 RX ORDER — ROPIVACAINE HYDROCHLORIDE 5 MG/ML
INJECTION, SOLUTION EPIDURAL; INFILTRATION; PERINEURAL
Status: COMPLETED | OUTPATIENT
Start: 2023-11-27 | End: 2023-11-27

## 2023-11-27 RX ORDER — IPRATROPIUM BROMIDE AND ALBUTEROL SULFATE 2.5; .5 MG/3ML; MG/3ML
1 SOLUTION RESPIRATORY (INHALATION)
Status: DISCONTINUED | OUTPATIENT
Start: 2023-11-27 | End: 2023-11-27 | Stop reason: HOSPADM

## 2023-11-27 RX ORDER — GLUCAGON 1 MG/ML
1 KIT INJECTION PRN
Status: DISCONTINUED | OUTPATIENT
Start: 2023-11-27 | End: 2023-11-27 | Stop reason: HOSPADM

## 2023-11-27 RX ORDER — SODIUM CHLORIDE 0.9 % (FLUSH) 0.9 %
5-40 SYRINGE (ML) INJECTION PRN
Status: DISCONTINUED | OUTPATIENT
Start: 2023-11-27 | End: 2023-11-27 | Stop reason: HOSPADM

## 2023-11-27 RX ORDER — DEXTROSE MONOHYDRATE 100 MG/ML
INJECTION, SOLUTION INTRAVENOUS CONTINUOUS PRN
Status: DISCONTINUED | OUTPATIENT
Start: 2023-11-27 | End: 2023-11-27 | Stop reason: HOSPADM

## 2023-11-27 RX ORDER — OXYCODONE HYDROCHLORIDE AND ACETAMINOPHEN 5; 325 MG/1; MG/1
1 TABLET ORAL EVERY 6 HOURS PRN
Qty: 28 TABLET | Refills: 0 | Status: SHIPPED | OUTPATIENT
Start: 2023-11-27 | End: 2023-12-04

## 2023-11-27 RX ORDER — MAGNESIUM HYDROXIDE 1200 MG/15ML
LIQUID ORAL CONTINUOUS PRN
Status: COMPLETED | OUTPATIENT
Start: 2023-11-27 | End: 2023-11-27

## 2023-11-27 RX ORDER — MEPERIDINE HYDROCHLORIDE 25 MG/ML
12.5 INJECTION INTRAMUSCULAR; INTRAVENOUS; SUBCUTANEOUS EVERY 5 MIN PRN
Status: DISCONTINUED | OUTPATIENT
Start: 2023-11-27 | End: 2023-11-27 | Stop reason: HOSPADM

## 2023-11-27 RX ORDER — PROPOFOL 10 MG/ML
INJECTION, EMULSION INTRAVENOUS CONTINUOUS PRN
Status: DISCONTINUED | OUTPATIENT
Start: 2023-11-27 | End: 2023-11-27 | Stop reason: SDUPTHER

## 2023-11-27 RX ORDER — LABETALOL HYDROCHLORIDE 5 MG/ML
10 INJECTION, SOLUTION INTRAVENOUS
Status: DISCONTINUED | OUTPATIENT
Start: 2023-11-27 | End: 2023-11-27 | Stop reason: HOSPADM

## 2023-11-27 RX ORDER — SODIUM CHLORIDE, SODIUM LACTATE, POTASSIUM CHLORIDE, CALCIUM CHLORIDE 600; 310; 30; 20 MG/100ML; MG/100ML; MG/100ML; MG/100ML
INJECTION, SOLUTION INTRAVENOUS CONTINUOUS
Status: DISCONTINUED | OUTPATIENT
Start: 2023-11-27 | End: 2023-11-27 | Stop reason: HOSPADM

## 2023-11-27 RX ORDER — OXYCODONE HYDROCHLORIDE 5 MG/1
10 TABLET ORAL PRN
Status: DISCONTINUED | OUTPATIENT
Start: 2023-11-27 | End: 2023-11-27 | Stop reason: HOSPADM

## 2023-11-27 RX ORDER — SODIUM CHLORIDE, SODIUM LACTATE, POTASSIUM CHLORIDE, CALCIUM CHLORIDE 600; 310; 30; 20 MG/100ML; MG/100ML; MG/100ML; MG/100ML
INJECTION, SOLUTION INTRAVENOUS CONTINUOUS PRN
Status: DISCONTINUED | OUTPATIENT
Start: 2023-11-27 | End: 2023-11-27 | Stop reason: SDUPTHER

## 2023-11-27 RX ORDER — KETOROLAC TROMETHAMINE 30 MG/ML
INJECTION, SOLUTION INTRAMUSCULAR; INTRAVENOUS PRN
Status: DISCONTINUED | OUTPATIENT
Start: 2023-11-27 | End: 2023-11-27 | Stop reason: SDUPTHER

## 2023-11-27 RX ORDER — OXYCODONE HYDROCHLORIDE 5 MG/1
5 TABLET ORAL PRN
Status: DISCONTINUED | OUTPATIENT
Start: 2023-11-27 | End: 2023-11-27 | Stop reason: HOSPADM

## 2023-11-27 RX ORDER — LIDOCAINE HYDROCHLORIDE 10 MG/ML
1 INJECTION, SOLUTION EPIDURAL; INFILTRATION; INTRACAUDAL; PERINEURAL
Status: DISCONTINUED | OUTPATIENT
Start: 2023-11-27 | End: 2023-11-27 | Stop reason: HOSPADM

## 2023-11-27 RX ADMIN — ROPIVACAINE HYDROCHLORIDE 20 ML: 5 INJECTION, SOLUTION EPIDURAL; INFILTRATION; PERINEURAL at 09:30

## 2023-11-27 RX ADMIN — SODIUM CHLORIDE, POTASSIUM CHLORIDE, SODIUM LACTATE AND CALCIUM CHLORIDE: 600; 310; 30; 20 INJECTION, SOLUTION INTRAVENOUS at 09:06

## 2023-11-27 RX ADMIN — SODIUM CHLORIDE, POTASSIUM CHLORIDE, SODIUM LACTATE AND CALCIUM CHLORIDE: 600; 310; 30; 20 INJECTION, SOLUTION INTRAVENOUS at 10:27

## 2023-11-27 RX ADMIN — PROPOFOL 100 MCG/KG/MIN: 10 INJECTION, EMULSION INTRAVENOUS at 10:31

## 2023-11-27 RX ADMIN — KETOROLAC TROMETHAMINE 30 MG: 30 INJECTION, SOLUTION INTRAMUSCULAR; INTRAVENOUS at 11:28

## 2023-11-27 RX ADMIN — MIDAZOLAM HYDROCHLORIDE 2 MG: 1 INJECTION, SOLUTION INTRAMUSCULAR; INTRAVENOUS at 09:27

## 2023-11-27 RX ADMIN — CEFAZOLIN 2000 MG: 2 INJECTION, POWDER, FOR SOLUTION INTRAMUSCULAR; INTRAVENOUS at 10:36

## 2023-11-27 RX ADMIN — ROPIVACAINE HYDROCHLORIDE 15 ML: 5 INJECTION, SOLUTION EPIDURAL; INFILTRATION; PERINEURAL at 09:25

## 2023-11-27 RX ADMIN — ONDANSETRON 4 MG: 2 INJECTION INTRAMUSCULAR; INTRAVENOUS at 11:27

## 2023-11-27 RX ADMIN — SODIUM CHLORIDE, POTASSIUM CHLORIDE, SODIUM LACTATE AND CALCIUM CHLORIDE: 600; 310; 30; 20 INJECTION, SOLUTION INTRAVENOUS at 11:32

## 2023-11-27 ASSESSMENT — PAIN - FUNCTIONAL ASSESSMENT: PAIN_FUNCTIONAL_ASSESSMENT: 0-10

## 2023-11-27 ASSESSMENT — PAIN SCALES - GENERAL: PAINLEVEL_OUTOF10: 0

## 2023-11-27 ASSESSMENT — PAIN DESCRIPTION - DESCRIPTORS: DESCRIPTORS: ACHING;NUMBNESS;TINGLING

## 2023-11-27 NOTE — ANESTHESIA PROCEDURE NOTES
Peripheral Block    Patient location during procedure: pre-op  Reason for block: post-op pain management and at surgeon's request  Start time: 11/27/2023 9:30 AM  End time: 11/27/2023 9:36 AM  Staffing  Performed: anesthesiologist   Anesthesiologist: Mona Gil MD  Performed by: Mona Gil MD  Authorized by: Mona Gil MD    Preanesthetic Checklist  Completed: patient identified, IV checked, site marked, risks and benefits discussed, surgical/procedural consents, equipment checked, pre-op evaluation, timeout performed, anesthesia consent given, oxygen available and monitors applied/VS acknowledged  Peripheral Block   Patient position: supine  Prep: ChloraPrep  Provider prep: mask and sterile gloves (Sterile probe cover)  Patient monitoring: cardiac monitor, continuous pulse ox, frequent blood pressure checks and IV access  Block type: Sciatic  Popliteal  Laterality: left  Injection technique: single-shot  Guidance: nerve stimulator and ultrasound guided  Local infiltration: ropivacaine  Infiltration strength: 0.5 %  Local infiltration: ropivacaine  Dose: 20 mL    Needle   Needle type: combined needle/nerve stimulator   Needle gauge: 22 G  Needle localization: anatomical landmarks and ultrasound guidance  Needle length: 5 cm  Assessment   Injection assessment: negative aspiration for heme, no paresthesia on injection and local visualized surrounding nerve on ultrasound  Paresthesia pain: immediately resolved  Slow fractionated injection: yes  Hemodynamics: stable  Real-time US image taken/store: yes    Additional Notes  Ultrasound image printed and saved in patient chart.     Sterile probe cover used    Medications Administered  ropivacaine (NAROPIN) injection 0.5% - Perineural   20 mL - 11/27/2023 9:30:00 AM

## 2023-11-27 NOTE — ANESTHESIA PROCEDURE NOTES
Peripheral Block    Patient location during procedure: pre-op  Reason for block: post-op pain management and at surgeon's request  Start time: 11/27/2023 9:25 AM  End time: 11/27/2023 9:36 AM  Staffing  Performed: anesthesiologist   Anesthesiologist: Mitchell Page MD  Performed by: Mitchell Page MD  Authorized by: Mitchell Page MD    Preanesthetic Checklist  Completed: patient identified, IV checked, site marked, risks and benefits discussed, surgical/procedural consents, equipment checked, pre-op evaluation, timeout performed, anesthesia consent given, oxygen available and monitors applied/VS acknowledged  Peripheral Block   Patient position: supine  Prep: ChloraPrep  Provider prep: mask and sterile gloves (Sterile probe cover)  Patient monitoring: cardiac monitor, continuous pulse ox, frequent blood pressure checks and IV access  Block type: Femoral  Adductor canal  Laterality: left  Injection technique: single-shot  Guidance: nerve stimulator and ultrasound guided  Local infiltration: ropivacaine  Infiltration strength: 0.5 %  Local infiltration: ropivacaine  Dose: 15 mL    Needle   Needle type: combined needle/nerve stimulator   Needle gauge: 22 G  Needle localization: anatomical landmarks and ultrasound guidance  Needle length: 5 cm  Assessment   Injection assessment: negative aspiration for heme, no paresthesia on injection and local visualized surrounding nerve on ultrasound  Paresthesia pain: immediately resolved  Slow fractionated injection: yes  Hemodynamics: stable  Real-time US image taken/store: yes    Additional Notes  Ultrasound image printed and saved in patient chart.     Sterile probe cover used    Medications Administered  ropivacaine (NAROPIN) injection 0.5% - Perineural   15 mL - 11/27/2023 9:25:00 AM

## 2023-11-27 NOTE — OP NOTE
directed to the ankle joint where medial and lateral portals for arthroscopy were planned avoiding neurovascular structures. Ankle joint was then insufflated with 30cc normal saline. 15 blade was then used to incise medial ankle portal through skin. Careful dissection was carried deep to the level of the subcutaneous tissue. Care was taken to preserve any neurovascular structures. Superficial venous bleeders were meticulously retracted and electrocauterized as necessary. Dissection then proceeded down through the subcutaneous tissues with mosquito to level of joint capsule which was penetrated. Canula was placed into portal site. Short arthroscope was then introduced after placement with obturator, inability to obtain clear view despite being in ankle joint was encountered due to arthroscope lens malfunction. Short arthroscope was removed in total. Joint was confirmed again via obturator and canula. Long arthroscope was then opened and was again inserted into ankle joint which was noted to have dense, scarred capsule as well as visible hypertrophic synovitis, no osteochondral lesions or abnormalities were identified. Wounds were flushed with copious amounts of normal sterile saline. Deep tissues were closed with 2-0 Vicryl suture. Tourniquet was released. Immediate reperfusion was noted to all digits. Hemostasis was achieved. The subcuticular tissue was closed with 4-0 Monocryl in running fashion with simple interrupted overlying closure with 4-0 Monocryl. Wounds were further dressed with xeroform, fluff dressings, Kerlix roll, and ACE. Patient tolerated the anesthesia and procedure very well, was transferred to the PACU with all vital signs stable and brisk capillary refill time noted in all toes. Patient's intraoperative and postoperative disposition was discussed with the family in the postoperative consultation area.  We dispensed prescriptions for Percocet with instructions for use as well as CAM

## 2023-11-27 NOTE — PROGRESS NOTES
CLINICAL PHARMACY NOTE: MEDS TO BEDS    Total # of Prescriptions Filled: 1   The following medications were delivered to the patient:  Oxycodone/apap 5-325mg tab    Additional Documentation:

## 2023-11-27 NOTE — H&P
Update History & Physical    The patient's History and Physical of November 16, 2023 was reviewed with the patient and I examined the patient. There was no change. The surgical site was confirmed by the patient and me. Plan: The risks, benefits, expected outcome, and alternative to the recommended procedure have been discussed with the patient. Patient understands and wants to proceed with the procedure.      Electronically signed by Dawood Ann DPM on 11/27/2023 at 5:47 AM

## 2023-11-27 NOTE — DISCHARGE INSTRUCTIONS
POST OPERATIVE INSTRUCTIONS    RESTRICTIONS AFTER ANESTHESIA:   - Do not drive, work with heavy equipment or sign legal documents for 24 hours  - Rest at home for 24 hours following anesthesia  - You may experience light-headedness, dizziness, nausea, or sleepiness after surgery. Stay in bed if you experience these symptoms  - Do not stay alone. A responsible adult must be with you for 24 hours. - Do not take any alcoholic beverages or sleeping pills for the next 18 hours  - You may experience muscle aches and sore throat  - If you experience difficulty breathing and/or shortness of breath, seek IMMEDIATE    medical attention. DRESSING: Keep surgical area clean and dry. Do NOT remove dressing. You may notice blood upon your bandage. Bleeding is expected and your bandage may become stained. You may reinforce with gauze or ace bandage. BATHING: Sponge bath only or use of a cast protector in the shower until first post op visit. ACTIVITY: Non weight bearing to left lower extremity with use of assistive devices as needed until your lower extremity nerve block wears off. Once your nerve block has worn off you may weight bear as tolerated in your CAM boot. - After discharge from the surgery center, go directly home and limit your activities. - Keep children and pets away from your operative foot. - No heavy lifting.  - Wear CAM boot at all times when ambulating. Ok to remove at rest.    ELEVATION: Elevate the operative site above the level of your heart for at least the next 3 days. This will help decrease pain and prevent swelling. Support the back of your knee with a pillow. ICE: Use ice pack behind left knee or at left ankle for 20 minutes on and off every hour when awake for the next 3 days. Do NOT fall asleep while using the ice pack. MEDICATION: Some degree of discomfort is to be expected after surgery and will improve gradually as the healing process continues.  Pain medication has

## 2023-11-27 NOTE — ANESTHESIA POSTPROCEDURE EVALUATION
Department of Anesthesiology  Postprocedure Note    Patient: William Doll  MRN: [id number]  YOB: 1980  Date of evaluation: 11/27/2023      Procedure Summary     Date: 11/27/23 Room / Location: [address]    Anesthesia Start: 1027 Anesthesia Stop:     Procedures:       REMOVAL OF HARDWARE (Left: Ankle)      ANKLE ARTHROSCOPY SYNOVECTOMY LEFT ANKLE ARTHREX SCREW REMOVAL.  (Left: Ankle) Diagnosis:       Painful orthopaedic hardware ([address])      Synovitis      (Painful orthopaedic hardware ([address]) [O38.03FV])      (Synovitis [M65.9])    Surgeons: Man Hays DPM Responsible Provider: Harry Prescott MD    Anesthesia Type: MAC, other ASA Status: 2          Anesthesia Type: MAC, other    Jennifer Phase I: Jennifer Score: 10    Jennifer Phase II:        Anesthesia Post Evaluation    Patient location during evaluation: PACU  Patient participation: complete - patient participated  Level of consciousness: awake, awake and alert and responsive to physical stimuli  Pain score: 0  Airway patency: patent  Nausea & Vomiting: no nausea and no vomiting  Complications: no  Cardiovascular status: blood pressure returned to baseline and hemodynamically stable  Respiratory status: acceptable and room air  Hydration status: euvolemic  Comments: VSS Report to RN  Multimodal analgesia pain management approach  Pain management: adequate

## 2024-02-13 ENCOUNTER — HOSPITAL ENCOUNTER (OUTPATIENT)
Dept: PHYSICAL THERAPY | Age: 44
Setting detail: THERAPIES SERIES
Discharge: HOME | End: 2024-02-13
Payer: COMMERCIAL

## 2024-02-13 PROCEDURE — 97162 PT EVAL MOD COMPLEX 30 MIN: CPT

## 2024-02-13 NOTE — PROGRESS NOTES
[address]   [address]   [place] [address]  Phone: [phone]                             Physical Therapy: Initial Evaluation    Patient: Jeny Villanueva (44 y.o.     female)   Examination Date: 2024   :  1980 ;    Confirmed: Yes MRN: [id number]  CSN: [id number]   Insurance: Payor: [facility] / Plan: [facility] MEDICAID / Product Type: *No Product type* /   Insurance ID: [id number] - (Medicaid Managed) PT Insurance Information: [facility] Secondary Insurance (if applicable):    Referring Physician: Matty Resendiz DPM      PCP: Margo Hammonds MD Visits to Date/Visits Approved:   (Eval only)    No Show/Cancelled Appts: 0 / 0     Medical Diagnosis: Posterior tibial tendinitis, left leg [M76.822]    Treatment Diagnosis: L foot/ankle pain with decreased ROM/strength affecting overall posture.     PERTINENT MEDICAL HISTORY   Patient Assessed for Rehabilitation Services: Yes       Medical History: Chart Reviewed: Yes   Past Medical History:   Diagnosis Date    ADHD     Breast cyst     HTN (hypertension)     Synovitis of left ankle 2023     Surgical History:   Past Surgical History:   Procedure Laterality Date    ANKLE ARTHROSCOPY Left 2023    ANKLE ARTHROSCOPY SYNOVECTOMY LEFT ANKLE ARTHREX SCREW REMOVAL. performed [place] Resendiz [place] [facility]    ANKLE FRACTURE SURGERY Left 2023    OPEN REDUCTION INTERNAL FIXATION TRIMALLEOLAR ANKLE FRACTURE performed by Matty Resendiz DPM [place] [facility]    ANKLE SURGERY Left 2023    REMOVAL OF HARDWARE performed by Matty Resendiz DPM [place] [facility]    BREAST BIOPSY       SECTION      , 2016    TONSILLECTOMY AND ADENOIDECTOMY         Medications:   Current Outpatient Medications:     amLODIPine (NORVASC) 10 MG tablet, Take 1 tablet by mouth daily, Disp: 90 tablet, Rfl: 3    losartan (COZAAR) 25 MG tablet, Take 1 tablet by mouth daily, Disp: 90 tablet, Rfl: 3    valACYclovir (VALTREX) 1

## 2024-02-13 NOTE — PLAN OF CARE
PHYSICAL THERAPY PLAN OF CARE    [address]   [place] [address]      Phone:[phone]    [] Certification  [] Recertification [x]  Plan of Care  [] Progress Note [] Discharge      Referring Provider: Matty Resendiz DPM     From:  Demetrice Perkins, PT    Patient: Jeny Villanueva (44 y.o. female) : 1980 Date: 2024   Medical Diagnosis: Posterior tibial tendinitis, left leg [M76.822]      Treatment Diagnosis: L foot/ankle pain with decreased ROM/strength affecting overall posture.      Progress Report Period from:  2024  to 2024    Visits to Date: 1 No Show: 0 Cancelled Appts: 0    OBJECTIVE:   Short Term Goals - Time Frame for Short Term Goals: 2 weeks    Goals Current/Discharge status  Status   Short Term Goal 1: Decrease L ankle pain 50% to assist with improved functional gains.  Pain Screening  Patient Currently in Pain: Yes  Pain Assessment: 0-10  Pain Level: 3  Best Pain Level: 1  Worst Pain Level: 8  Pain Location: Foot  Pain Orientation: Left  Pain Descriptors: Burning, Shooting, Sharp (tightness) STG Goal 1 Status:: New           Long Term Goals - Time Frame for Long Term Goals : 4-6 weeks  Goals Current/ Discharge status Status   Long Term Goal 1: Indep HEP for symptom management Needs Written HEP initiated  for symptom management  Needs progression for comprehensive program development. LTG Goal 1 Status:: New   Long Term Goal 2: Pt demo improved overall function by reporting greater than 60% per functional survey score Exam: LEFS 34/80=43% functional   LTG Goal 2 Status:: New   Long Term Goal 3: Pain-free L ankle AROM to 12°DF, Inv 20°, PF 35° WNL allowing an increase in ADL tolerance. AROM LLE (degrees)  L Ankle Dorsiflexion (0-20): 2  L Ankle Plantar Flexion (0-45): 25 with compensated forefoot flex  L Ankle Forefoot Inversion (0-40): 20  L Ankle Forefoot Eversion (0-20): 10  AROM RLE (degrees)  R Ankle Dorsiflexion (0-20): 10  R Ankle Plantar Flexion

## 2024-02-16 ENCOUNTER — HOSPITAL ENCOUNTER (OUTPATIENT)
Dept: PHYSICAL THERAPY | Age: 44
Setting detail: THERAPIES SERIES
Discharge: HOME | End: 2024-02-16
Payer: COMMERCIAL

## 2024-02-16 PROCEDURE — 97110 THERAPEUTIC EXERCISES: CPT

## 2024-02-16 PROCEDURE — G0283 ELEC STIM OTHER THAN WOUND: HCPCS

## 2024-02-16 NOTE — PROGRESS NOTES
[address]   [place] [id number]  Phone:[phone]      Physical TherapyTreatment Note        Date: 2024  Patient: Jeny Villanueva  : 1980   Confirmed: Yes  MRN: [id number]  Referring Provider: Matty Resendiz DPM  Medical Diagnosis: Posterior tibial tendinitis, left leg [M76.822]   Treatment Diagnosis: L foot/ankle pain with decreased ROM/strength affecting overall posture.    Visit Information:  Insurance: Payor: [facility] / Plan: [facility] MEDICAID / Product Type: *No Product type* /   PT Visit Information  Onset Date: 23  PT Insurance Information: [employer]  Total # of Visits to Date: 2  No Show: 0  Canceled Appointment: 0  Progress Note Counter: 3/48 units 24-3/29/24    Subjective Information:  Subjective: Pt got a foot massage on Lema's Day and it triggered some of the nerve in her foot. It's been hurting more since then.  HEP Compliance:  [x] Good [] Fair     [] Poor [] Reports not doing due to:    Pain Screening  Patient Currently in Pain: Yes  Pain Assessment: 0-10  Pain Level: 5  Pain Location: Foot  Pain Orientation: Left  Pain Descriptors: Burning, Shooting, Sharp    Treatment:  Exercises:  Exercises  Exercise 1: marble  and place into container x 1 , toe scrunches on towel x 2 min  Exercise 2: L great toe ext  with toes flat on floor x 10  Exercise 3: L gastroc stretch with towel/strap 5 x 10\"  Exercise 4: ankle tband*  Exercise 5: Baps board L3 DF/PF, Inv/EV, cw/ccw x about 15 ea  Exercise 6: SLS on foam*  Exercise 7: advance wt bearing fwd, lat and backward 1/2 lunge*  Treatment Reasoning  Limitations addressed: Strength, Pain modulation, Mobility, Activity tolerance, Flexibility, Proprioception  Functional ability(s) targeted: Ambulating community distances, Tolerance to age appropriate activities      Modalities:  Ultrasound (CPT 17407)  Patient Position: Seated  Ultrasound location: Left, Medial  Ultrasound specified location:

## 2024-02-19 ENCOUNTER — HOSPITAL ENCOUNTER (OUTPATIENT)
Dept: PHYSICAL THERAPY | Age: 44
Setting detail: THERAPIES SERIES
Discharge: HOME | End: 2024-02-19
Payer: COMMERCIAL

## 2024-02-19 PROCEDURE — G0283 ELEC STIM OTHER THAN WOUND: HCPCS

## 2024-02-19 PROCEDURE — 97110 THERAPEUTIC EXERCISES: CPT

## 2024-02-19 PROCEDURE — 97035 APP MDLTY 1+ULTRASOUND EA 15: CPT

## 2024-02-19 ASSESSMENT — PAIN SCALES - GENERAL: PAINLEVEL_OUTOF10: 5

## 2024-02-19 ASSESSMENT — PAIN DESCRIPTION - ORIENTATION: ORIENTATION: LEFT

## 2024-02-19 ASSESSMENT — PAIN DESCRIPTION - LOCATION: LOCATION: FOOT

## 2024-02-19 ASSESSMENT — PAIN DESCRIPTION - DESCRIPTORS: DESCRIPTORS: BURNING;SHARP;SHOOTING

## 2024-02-19 NOTE — PROGRESS NOTES
[address]   [place] [id number]  Phone:[phone]      Physical TherapyTreatment Note        Date: 2024  Patient: Jeny Villanueva  : 1980   Confirmed: Yes  MRN: [id number]  Referring Provider: Matty Resendiz DPM  Medical Diagnosis: Posterior tibial tendinitis, left leg [M76.822]   Treatment Diagnosis: L foot/ankle pain with decreased ROM/strength affecting overall posture.    Visit Information:  Insurance: Payor: [facility] / Plan: [facility] MEDICAID / Product Type: *No Product type* /   PT Visit Information  Onset Date: 23  PT Insurance Information: [employer]  Total # of Visits to Date: 3  No Show: 0  Canceled Appointment: 0  Progress Note Counter:  units 24-3/29/24    Subjective Information:  Subjective: Pt states that her ankle/foot feels okay this morning. She was on her feet cooking a lot yesterday which aggravated the pain so put brace on.  HEP Compliance:  [x] Good [] Fair  [] Poor [] Reports not doing due to:    Pain Screening  Patient Currently in Pain: Yes  Pain Assessment: 0-10  Pain Level: 5  Pain Location: Foot  Pain Orientation: Left  Pain Descriptors: Burning, Sharp, Shooting    Treatment:  Exercises:  Exercises  Exercise 1: marble  and place into container x 1 , toe scrunches on towel x 2 min  Exercise 2: L great toe ext with toes flat on floor x 10  Exercise 3: L gastroc stretch with towel/strap 5 x 10\"  Exercise 4: L ankle 4 way with GTB x 15 ea  Exercise 6: SLS on foam x 2 min with min UE  Exercise 7: advance wt bearing fwd, lat and backward 1/2 lunge*  Exercise 9: HEP: gastroc str, towel scrunches, marble , great toe ext, SLS, 4 way ankle tband  Treatment Reasoning  Limitations addressed: Strength, Pain modulation, Mobility, Activity tolerance, Flexibility, Proprioception  Functional ability(s) targeted: Ambulating community distances, Tolerance to age appropriate activities    Modalities:  Ultrasound (CPT

## 2024-02-22 ENCOUNTER — HOSPITAL ENCOUNTER (OUTPATIENT)
Dept: PHYSICAL THERAPY | Age: 44
Setting detail: THERAPIES SERIES
Discharge: HOME | End: 2024-02-22
Payer: COMMERCIAL

## 2024-02-22 PROCEDURE — 97110 THERAPEUTIC EXERCISES: CPT

## 2024-02-22 PROCEDURE — 97035 APP MDLTY 1+ULTRASOUND EA 15: CPT

## 2024-02-22 ASSESSMENT — PAIN DESCRIPTION - DESCRIPTORS: DESCRIPTORS: BURNING

## 2024-02-22 ASSESSMENT — PAIN DESCRIPTION - LOCATION: LOCATION: FOOT

## 2024-02-22 ASSESSMENT — PAIN DESCRIPTION - ORIENTATION: ORIENTATION: LEFT

## 2024-02-22 ASSESSMENT — PAIN SCALES - GENERAL: PAINLEVEL_OUTOF10: 4

## 2024-02-22 NOTE — PROGRESS NOTES
[address]   [place] [id number]  Phone:[phone]      Physical TherapyTreatment Note        Date: 2024  Patient: Jeny Villanueva  : 1980   Confirmed: Yes  MRN: [id number]  Referring Provider: Matty Resendiz DPM  Medical Diagnosis: Posterior tibial tendinitis, left leg [M76.822]   Treatment Diagnosis: L foot/ankle pain with decreased ROM/strength affecting overall posture.    Visit Information:  Insurance: Payor: [facility] / Plan: [facility]ICA[facility] / Product Type: *No Product type* /   PT Visit Information  Onset Date: 23  PT Insurance Information: [employer]  Total # of Visits to Date: 4  No Show: 0  Canceled Appointment: 0  Progress Note Counter: 10/48 units 24-3/29/24    Subjective Information:  Subjective: Pt feels a \"nerve pain\" going up the back her her achillis. She hasn't been wearing her ankle brace as much and feels more stable. Did her ankle tband exercises already this morning.  HEP Compliance:  [x] Good [] Fair  [] Poor [] Reports not doing due to:    Pain Screening  Patient Currently in Pain: Yes  Pain Assessment: 0-10  Pain Level: 4  Pain Location: Foot  Pain Orientation: Left  Pain Descriptors: Burning    Treatment:  Exercises:  Exercises  Exercise 3: standing L gastroc stretch with wedge 3 x 20\"  Exercise 5: Baps board L3 DF/PF, Inv/EV, cw/ccw x about 20 ea  Exercise 6: SLS on foam x 2 min with min UE  Treatment Reasoning  Limitations addressed: Strength, Pain modulation, Mobility, Activity tolerance, Flexibility, Proprioception  Functional ability(s) targeted: Ambulating community distances, Tolerance to age appropriate activities    Manual:   Manual Therapy  Other: IDN benefits, risks, and procedures, and provided written handout of HEP and educational info for IDN; IDN consent form signed; IDN to decrease pain and improve MS mechanics to be scanned upon D/C (10 minute)      Modalities:  Ultrasound (CPT 67807)  Patient Position:

## 2024-02-27 ENCOUNTER — HOSPITAL ENCOUNTER (OUTPATIENT)
Dept: PHYSICAL THERAPY | Age: 44
Setting detail: THERAPIES SERIES
Discharge: HOME | End: 2024-02-27
Payer: COMMERCIAL

## 2024-02-27 PROCEDURE — 97110 THERAPEUTIC EXERCISES: CPT

## 2024-02-27 PROCEDURE — 97035 APP MDLTY 1+ULTRASOUND EA 15: CPT

## 2024-02-27 ASSESSMENT — PAIN DESCRIPTION - ORIENTATION: ORIENTATION: LEFT

## 2024-02-27 ASSESSMENT — PAIN DESCRIPTION - DESCRIPTORS: DESCRIPTORS: BURNING

## 2024-02-27 ASSESSMENT — PAIN DESCRIPTION - LOCATION: LOCATION: FOOT

## 2024-02-27 NOTE — PROGRESS NOTES
for any therapeutic exercise changes, additions or modifications this date.    Therapy Time:  PT Individual Minutes  Time In: 0920  Time Out: 1015  Minutes: 55  Timed Code Treatment Minutes: 55 Minutes  Timed Activity Minutes Units   Ther Ex 43 3   Ultrasound  12 1     Electronically signed by Lissette Pack PTA on 2/27/24 at 10:30 AM EST

## 2024-03-05 ENCOUNTER — OFFICE VISIT (OUTPATIENT)
Dept: PRIMARY CARE CLINIC | Age: 44
End: 2024-03-05
Payer: COMMERCIAL

## 2024-03-05 VITALS
DIASTOLIC BLOOD PRESSURE: 100 MMHG | WEIGHT: 160 LBS | SYSTOLIC BLOOD PRESSURE: 160 MMHG | HEART RATE: 108 BPM | BODY MASS INDEX: 25.44 KG/M2 | OXYGEN SATURATION: 98 %

## 2024-03-05 DIAGNOSIS — G58.9 PINCHED NERVE IN NECK: Primary | ICD-10-CM

## 2024-03-05 PROCEDURE — 3077F SYST BP >= 140 MM HG: CPT | Performed by: STUDENT IN AN ORGANIZED HEALTH CARE EDUCATION/TRAINING PROGRAM

## 2024-03-05 PROCEDURE — G8484 FLU IMMUNIZE NO ADMIN: HCPCS | Performed by: STUDENT IN AN ORGANIZED HEALTH CARE EDUCATION/TRAINING PROGRAM

## 2024-03-05 PROCEDURE — G8419 CALC BMI OUT NRM PARAM NOF/U: HCPCS | Performed by: STUDENT IN AN ORGANIZED HEALTH CARE EDUCATION/TRAINING PROGRAM

## 2024-03-05 PROCEDURE — 4004F PT TOBACCO SCREEN RCVD TLK: CPT | Performed by: STUDENT IN AN ORGANIZED HEALTH CARE EDUCATION/TRAINING PROGRAM

## 2024-03-05 PROCEDURE — G8427 DOCREV CUR MEDS BY ELIG CLIN: HCPCS | Performed by: STUDENT IN AN ORGANIZED HEALTH CARE EDUCATION/TRAINING PROGRAM

## 2024-03-05 PROCEDURE — 96372 THER/PROPH/DIAG INJ SC/IM: CPT | Performed by: STUDENT IN AN ORGANIZED HEALTH CARE EDUCATION/TRAINING PROGRAM

## 2024-03-05 PROCEDURE — 99214 OFFICE O/P EST MOD 30 MIN: CPT | Performed by: STUDENT IN AN ORGANIZED HEALTH CARE EDUCATION/TRAINING PROGRAM

## 2024-03-05 PROCEDURE — 3080F DIAST BP >= 90 MM HG: CPT | Performed by: STUDENT IN AN ORGANIZED HEALTH CARE EDUCATION/TRAINING PROGRAM

## 2024-03-05 RX ORDER — TRAMADOL HYDROCHLORIDE 50 MG/1
50 TABLET ORAL EVERY 4 HOURS PRN
Qty: 42 TABLET | Refills: 0 | Status: SHIPPED | OUTPATIENT
Start: 2024-03-05 | End: 2024-03-12

## 2024-03-05 RX ORDER — CYCLOBENZAPRINE HCL 10 MG
10 TABLET ORAL 3 TIMES DAILY PRN
Qty: 30 TABLET | Refills: 0 | Status: SHIPPED | OUTPATIENT
Start: 2024-03-05

## 2024-03-05 RX ORDER — IBUPROFEN 800 MG/1
TABLET ORAL
COMMUNITY
Start: 2024-01-23

## 2024-03-05 RX ORDER — KETOROLAC TROMETHAMINE 30 MG/ML
30 INJECTION, SOLUTION INTRAMUSCULAR; INTRAVENOUS ONCE
Status: COMPLETED | OUTPATIENT
Start: 2024-03-05 | End: 2024-03-05

## 2024-03-05 RX ADMIN — KETOROLAC TROMETHAMINE 30 MG: 30 INJECTION, SOLUTION INTRAMUSCULAR; INTRAVENOUS at 16:40

## 2024-03-05 ASSESSMENT — PATIENT HEALTH QUESTIONNAIRE - PHQ9
3. TROUBLE FALLING OR STAYING ASLEEP: 0
SUM OF ALL RESPONSES TO PHQ QUESTIONS 1-9: 0
5. POOR APPETITE OR OVEREATING: 0
SUM OF ALL RESPONSES TO PHQ QUESTIONS 1-9: 0
SUM OF ALL RESPONSES TO PHQ9 QUESTIONS 1 & 2: 0
6. FEELING BAD ABOUT YOURSELF - OR THAT YOU ARE A FAILURE OR HAVE LET YOURSELF OR YOUR FAMILY DOWN: 0
SUM OF ALL RESPONSES TO PHQ QUESTIONS 1-9: 0
4. FEELING TIRED OR HAVING LITTLE ENERGY: 0
9. THOUGHTS THAT YOU WOULD BE BETTER OFF DEAD, OR OF HURTING YOURSELF: 0
2. FEELING DOWN, DEPRESSED OR HOPELESS: 0
10. IF YOU CHECKED OFF ANY PROBLEMS, HOW DIFFICULT HAVE THESE PROBLEMS MADE IT FOR YOU TO DO YOUR WORK, TAKE CARE OF THINGS AT HOME, OR GET ALONG WITH OTHER PEOPLE: 0
SUM OF ALL RESPONSES TO PHQ QUESTIONS 1-9: 0
1. LITTLE INTEREST OR PLEASURE IN DOING THINGS: 0
8. MOVING OR SPEAKING SO SLOWLY THAT OTHER PEOPLE COULD HAVE NOTICED. OR THE OPPOSITE, BEING SO FIGETY OR RESTLESS THAT YOU HAVE BEEN MOVING AROUND A LOT MORE THAN USUAL: 0
7. TROUBLE CONCENTRATING ON THINGS, SUCH AS READING THE NEWSPAPER OR WATCHING TELEVISION: 0

## 2024-03-05 NOTE — PATIENT INSTRUCTIONS
You were given a shot of Toradol today.  Please make sure not to take any other anti-inflammatories for 24 hours besides Tylenol.    You were prescribed cyclobenzaprine which is a muscle relaxer, take this up to 3 times daily    You are prescribed tramadol for pain.  Take this for breakthrough pain if there is no response to the above treatments.  Make sure to separate this from trazodone by 6 hours as they can interact.    I would appreciate it if you could please complete survey in [employer] about your experience today and my service!
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
## 2024-03-05 NOTE — PROGRESS NOTES
After obtaining consent, and per orders of Dr. Burgos, injection of Toradol 30mg  given in Left deltoid by Noreen Love MA. Patient instructed to remain in clinic for 20 minutes afterwards, and to report any adverse reaction to me immediately.

## 2024-07-24 ENCOUNTER — OFFICE VISIT (OUTPATIENT)
Dept: PRIMARY CARE CLINIC | Age: 44
End: 2024-07-24
Payer: COMMERCIAL

## 2024-07-24 VITALS
DIASTOLIC BLOOD PRESSURE: 100 MMHG | HEART RATE: 86 BPM | SYSTOLIC BLOOD PRESSURE: 142 MMHG | OXYGEN SATURATION: 99 % | BODY MASS INDEX: 26.76 KG/M2 | TEMPERATURE: 98.4 F | HEIGHT: 65 IN | WEIGHT: 160.6 LBS

## 2024-07-24 DIAGNOSIS — J01.90 ACUTE SINUSITIS, RECURRENCE NOT SPECIFIED, UNSPECIFIED LOCATION: Primary | ICD-10-CM

## 2024-07-24 PROCEDURE — 4004F PT TOBACCO SCREEN RCVD TLK: CPT | Performed by: STUDENT IN AN ORGANIZED HEALTH CARE EDUCATION/TRAINING PROGRAM

## 2024-07-24 PROCEDURE — 99214 OFFICE O/P EST MOD 30 MIN: CPT | Performed by: STUDENT IN AN ORGANIZED HEALTH CARE EDUCATION/TRAINING PROGRAM

## 2024-07-24 PROCEDURE — 3077F SYST BP >= 140 MM HG: CPT | Performed by: STUDENT IN AN ORGANIZED HEALTH CARE EDUCATION/TRAINING PROGRAM

## 2024-07-24 PROCEDURE — G8427 DOCREV CUR MEDS BY ELIG CLIN: HCPCS | Performed by: STUDENT IN AN ORGANIZED HEALTH CARE EDUCATION/TRAINING PROGRAM

## 2024-07-24 PROCEDURE — G8419 CALC BMI OUT NRM PARAM NOF/U: HCPCS | Performed by: STUDENT IN AN ORGANIZED HEALTH CARE EDUCATION/TRAINING PROGRAM

## 2024-07-24 PROCEDURE — 3080F DIAST BP >= 90 MM HG: CPT | Performed by: STUDENT IN AN ORGANIZED HEALTH CARE EDUCATION/TRAINING PROGRAM

## 2024-07-24 RX ORDER — AMOXICILLIN AND CLAVULANATE POTASSIUM 875; 125 MG/1; MG/1
1 TABLET, FILM COATED ORAL 2 TIMES DAILY
Qty: 10 TABLET | Refills: 0 | Status: SHIPPED | OUTPATIENT
Start: 2024-07-24 | End: 2024-07-29

## 2024-07-24 RX ORDER — FLUCONAZOLE 150 MG/1
150 TABLET ORAL
Qty: 2 TABLET | Refills: 0 | Status: SHIPPED | OUTPATIENT
Start: 2024-07-24 | End: 2024-07-30

## 2024-07-24 RX ORDER — FERROUS SULFATE 325(65) MG
325 TABLET ORAL
COMMUNITY
Start: 2016-07-14

## 2024-07-24 RX ORDER — BENZONATATE 200 MG/1
200 CAPSULE ORAL 3 TIMES DAILY PRN
Qty: 30 CAPSULE | Refills: 0 | Status: SHIPPED | OUTPATIENT
Start: 2024-07-24 | End: 2024-08-03

## 2024-07-24 NOTE — PATIENT INSTRUCTIONS
Augmentin 875/125 mg twice daily x 5 days  -Vitamin C 1000 mg daily for 3 to 5 days  -Tylenol (500 mg every 6 hours or 1000 mg every 8 hours) OR ibuprofen (400 to 600 mg every 8 hours) for aches and chills and fever  -Beckie pot/saline nasal rinses/Flonase for nasal congestion, sinus pressure, nasal drainage  -Cepacol throat lozenges, Vicks vapocool, or Chloraseptic throat spray for throat pain  -Mucinex for just chest congestion, or Mucinex DM for chest congestion and cough

## 2024-07-24 NOTE — PROGRESS NOTES
7/24/2024        Jeny Villanueva 1980 is a 44 y.o. female who presents today with:  Chief Complaint   Patient presents with    Cough     2 weeks started with congestion and sore throat.  Now is the cough and drainage        HPI:   Patient presents today due to sickness 2-3 weeks.  Pertinent positives: Started with sore throat and congestion, now experiencing cough and nasal drainage.  Pertinent negatives: Fever, aches, chills, nausea, vomiting, diarrhea, shortness of breath, wheezing  Treatments tried: Mucinex DM, Acetaminophen, amoxicillin x 5 pills  Sick Contacts: kids recently    Assessment & Plan   1. Acute sinusitis, recurrence not specified, unspecified location  -     amoxicillin-clavulanate (AUGMENTIN) 875-125 MG per tablet; Take 1 tablet by mouth 2 times daily for 5 days, Disp-10 tablet, R-0Normal  -     benzonatate (TESSALON) 200 MG capsule; Take 1 capsule by mouth 3 times daily as needed for Cough, Disp-30 capsule, R-0Normal  -     fluconazole (DIFLUCAN) 150 MG tablet; Take 1 tablet by mouth every 72 hours for 6 days, Disp-2 tablet, R-0Normal     Medications Discontinued During This Encounter   Medication Reason     MG tablet LIST CLEANUP     Return if symptoms worsen or fail to improve.        Objective  Allergies   Allergen Reactions    Latex      Added based on information entered during case entry, please review and add reactions, type, and severity as needed     Current Outpatient Medications   Medication Sig Dispense Refill    amoxicillin-clavulanate (AUGMENTIN) 875-125 MG per tablet Take 1 tablet by mouth 2 times daily for 5 days 10 tablet 0    benzonatate (TESSALON) 200 MG capsule Take 1 capsule by mouth 3 times daily as needed for Cough 30 capsule 0    fluconazole (DIFLUCAN) 150 MG tablet Take 1 tablet by mouth every 72 hours for 6 days 2 tablet 0    valACYclovir (VALTREX) 1 g tablet 1 g once daily for 5 days when you get an outbreak of herpes 50 tablet 3    traZODone (DESYREL) 50 MG

## 2024-08-26 ENCOUNTER — OFFICE VISIT (OUTPATIENT)
Dept: PRIMARY CARE CLINIC | Age: 44
End: 2024-08-26
Payer: COMMERCIAL

## 2024-08-26 VITALS
WEIGHT: 159.2 LBS | DIASTOLIC BLOOD PRESSURE: 102 MMHG | SYSTOLIC BLOOD PRESSURE: 142 MMHG | OXYGEN SATURATION: 99 % | HEART RATE: 82 BPM | BODY MASS INDEX: 26.49 KG/M2

## 2024-08-26 DIAGNOSIS — R58 ECCHYMOSIS: Primary | ICD-10-CM

## 2024-08-26 DIAGNOSIS — Z13.29 THYROID DISORDER SCREEN: ICD-10-CM

## 2024-08-26 DIAGNOSIS — N92.6 ABNORMAL MENSTRUAL CYCLE: ICD-10-CM

## 2024-08-26 PROCEDURE — G8419 CALC BMI OUT NRM PARAM NOF/U: HCPCS | Performed by: STUDENT IN AN ORGANIZED HEALTH CARE EDUCATION/TRAINING PROGRAM

## 2024-08-26 PROCEDURE — 99214 OFFICE O/P EST MOD 30 MIN: CPT | Performed by: STUDENT IN AN ORGANIZED HEALTH CARE EDUCATION/TRAINING PROGRAM

## 2024-08-26 PROCEDURE — 4004F PT TOBACCO SCREEN RCVD TLK: CPT | Performed by: STUDENT IN AN ORGANIZED HEALTH CARE EDUCATION/TRAINING PROGRAM

## 2024-08-26 PROCEDURE — 3080F DIAST BP >= 90 MM HG: CPT | Performed by: STUDENT IN AN ORGANIZED HEALTH CARE EDUCATION/TRAINING PROGRAM

## 2024-08-26 PROCEDURE — 3077F SYST BP >= 140 MM HG: CPT | Performed by: STUDENT IN AN ORGANIZED HEALTH CARE EDUCATION/TRAINING PROGRAM

## 2024-08-26 PROCEDURE — G8427 DOCREV CUR MEDS BY ELIG CLIN: HCPCS | Performed by: STUDENT IN AN ORGANIZED HEALTH CARE EDUCATION/TRAINING PROGRAM

## 2024-08-26 NOTE — PROGRESS NOTES
8/26/2024        Jeny Villanueva 1980 is a 44 y.o. female who presents today with:  Chief Complaint   Patient presents with    Bleeding/Bruising     Bruising easily.  Arms legs.  Child can't even lay on her without causing bruising.  Questions perimenopause etc.         HPI:   Minerva presents today due to easy bruising.  She states that this is a chronic issue for her however she has never found a cause as that she has never had a complete workup.  She states she has a bruise on her right outer arm.  She states that even when her child lies on her she has bruising.  She denies any hematemesis, hemoptysis, hematochezia, melena, epistaxis, or any other bleeding.  She had CBC and PT/INR done last April which was normal.  No other testing has been done yet.  She denies any family history of bleeding disorders such as hemophilia.    She states that she believes her cycles are becoming abnormal and she would like to have hormonal testing.    Assessment & Plan   1. Ecchymosis  -     [facility] - Griffin Garcia DO, Oncology, [place]  2. Abnormal menstrual cycle  -     Follicle Stimulating Hormone; Future  -     Luteinizing Hormone; Future  3. Thyroid disorder screen  -     TSH with Reflex; Future     Medications Discontinued During This Encounter   Medication Reason    ferrous sulfate (IRON 325) 325 (65 Fe) MG tablet LIST CLEANUP    cyclobenzaprine (FLEXERIL) 10 MG tablet LIST CLEANUP     No follow-ups on file.        Objective  Allergies   Allergen Reactions    Latex      Added based on information entered during case entry, please review and add reactions, type, and severity as needed     Current Outpatient Medications   Medication Sig Dispense Refill    valACYclovir (VALTREX) 1 g tablet 1 g once daily for 5 days when you get an outbreak of herpes 50 tablet 3    traZODone (DESYREL) 50 MG tablet TAKE 1 TO 2 TABLETS BY MOUTH AT BEDTIME AS NEEDED FOR SLEEP      amphetamine-dextroamphetamine (ADDERALL) 20 MG tablet Take 1 tablet  Bowel sounds are normal.      Palpations: Abdomen is soft.   Musculoskeletal:         General: Normal range of motion.   Skin:     General: Skin is warm and dry.      Findings: Bruising (Bruise on the dorsal aspect of the right upper arm) present.   Neurological:      Mental Status: She is alert and oriented to person, place, and time. Mental status is at baseline.   Psychiatric:         Mood and Affect: Mood normal.         Behavior: Behavior normal.               Reviewed with the patient: current clinical status, medications, activities and diet.     Side effects, adverse effects of the medication prescribed today, as well as treatment plan/ rationale and result expectations have been discussed with the patient who expresses understanding and desires to proceed.    Close follow up to evaluate treatment results and for coordination of care.  I have reviewed the patient's medical history in detail and updated the computerized patient record.    Bhargavi Burgos MD

## 2024-09-04 ENCOUNTER — OFFICE VISIT (OUTPATIENT)
Dept: OBGYN CLINIC | Age: 44
End: 2024-09-04
Payer: COMMERCIAL

## 2024-09-04 VITALS
HEIGHT: 65 IN | BODY MASS INDEX: 26.82 KG/M2 | DIASTOLIC BLOOD PRESSURE: 88 MMHG | WEIGHT: 161 LBS | SYSTOLIC BLOOD PRESSURE: 136 MMHG

## 2024-09-04 DIAGNOSIS — Z72.51 UNPROTECTED SEX: ICD-10-CM

## 2024-09-04 DIAGNOSIS — N89.8 VAGINAL ODOR: ICD-10-CM

## 2024-09-04 DIAGNOSIS — N76.0 OTHER VAGINITIS: ICD-10-CM

## 2024-09-04 DIAGNOSIS — Z01.419 ENCOUNTER FOR WELL WOMAN EXAM WITH ROUTINE GYNECOLOGICAL EXAM: Primary | ICD-10-CM

## 2024-09-04 DIAGNOSIS — Z12.31 SCREENING MAMMOGRAM FOR BREAST CANCER: ICD-10-CM

## 2024-09-04 PROCEDURE — 3079F DIAST BP 80-89 MM HG: CPT | Performed by: OBSTETRICS & GYNECOLOGY

## 2024-09-04 PROCEDURE — 3075F SYST BP GE 130 - 139MM HG: CPT | Performed by: OBSTETRICS & GYNECOLOGY

## 2024-09-04 PROCEDURE — 99396 PREV VISIT EST AGE 40-64: CPT | Performed by: OBSTETRICS & GYNECOLOGY

## 2024-09-04 ASSESSMENT — ENCOUNTER SYMPTOMS
RESPIRATORY NEGATIVE: 1
NAUSEA: 0
ABDOMINAL DISTENTION: 0
ALLERGIC/IMMUNOLOGIC NEGATIVE: 1
RECTAL PAIN: 0
EYES NEGATIVE: 1
CONSTIPATION: 0
ANAL BLEEDING: 0
VOMITING: 0
BLOOD IN STOOL: 0
ABDOMINAL PAIN: 0
DIARRHEA: 0

## 2024-09-04 ASSESSMENT — VISUAL ACUITY: OU: 1

## 2024-09-04 NOTE — PROGRESS NOTES
wThe patient was asked if she would like a chaperone present for her intimate exam. She  Declined the chaperone. Brian Rothman CMA (AAMA)    
History    Marital status:      Spouse name: Not on file    Number of children: Not on file    Years of education: Not on file    Highest education level: Not on file   Occupational History    Not on file   Tobacco Use    Smoking status: Every Day     Types: Cigars    Smokeless tobacco: Never    Tobacco comments:     1 cigar/day   Vaping Use    Vaping status: Never Used   Substance and Sexual Activity    Alcohol use: Yes     Comment: weekly    Drug use: Never    Sexual activity: Yes     Partners: Male   Other Topics Concern    Not on file   Social History Narrative    Not on file     Social Determinants of Health     Financial Resource Strain: Low Risk  (7/24/2024)    Overall Financial Resource Strain (CARDIA)     Difficulty of Paying Living Expenses: Not hard at all   Food Insecurity: No Food Insecurity (7/24/2024)    Hunger Vital Sign     Worried About Running Out of Food in the Last Year: Never true     Ran Out of Food in the Last Year: Never true   Transportation Needs: Unknown (7/24/2024)    PRAPARE - Transportation     Lack of Transportation (Medical): Not on file     Lack of Transportation (Non-Medical): No   Physical Activity: Not on File (8/19/2019)    Received from [facility]    Physical Activity     Physical Activity: 0   Stress: Not on File (8/19/2019)    Received from [facility]    Stress     Stress: 0   Social Connections: Not on File (8/19/2019)    Received from [facility]    Social Connections     Social Connections and Isolation: 0   Intimate Partner Violence: Not on file   Housing Stability: Unknown (7/24/2024)    Housing Stability Vital Sign     Unable to Pay for Housing in the Last Year: Not on file     Number of Places Lived in the Last Year: Not on file     Unstable Housing in the Last Year: No     Family History   Problem Relation Age of Onset    High Blood Pressure Mother     No Known Problems Father     No Known Problems Brother     Diabetes Maternal Uncle     Diabetes Maternal

## 2024-09-05 LAB
CLUE CELLS VAG QL WET PREP: NORMAL
T VAGINALIS VAG QL WET PREP: NORMAL
TRICHOMONAS VAGINALIS SCREEN: NEGATIVE
YEAST VAG QL WET PREP: NORMAL

## 2024-09-06 LAB
C TRACH DNA CVX QL NAA+PROBE: NEGATIVE
N GONORRHOEA DNA CERV MUCUS QL NAA+PROBE: NEGATIVE

## 2024-09-10 ENCOUNTER — HOSPITAL ENCOUNTER (OUTPATIENT)
Dept: WOMENS IMAGING | Age: 44
Discharge: HOME | End: 2024-09-12
Attending: OBSTETRICS & GYNECOLOGY
Payer: COMMERCIAL

## 2024-09-10 VITALS — HEIGHT: 65 IN | BODY MASS INDEX: 26.79 KG/M2

## 2024-09-10 DIAGNOSIS — Z12.31 SCREENING MAMMOGRAM FOR BREAST CANCER: ICD-10-CM

## 2024-09-10 PROCEDURE — 77063 BREAST TOMOSYNTHESIS BI: CPT

## 2024-10-03 ENCOUNTER — TRANSCRIBE ORDERS (OUTPATIENT)
Dept: ADMINISTRATIVE | Age: 44
End: 2024-10-03

## 2024-10-03 DIAGNOSIS — R94.5 NONSPECIFIC ABNORMAL RESULTS OF LIVER FUNCTION STUDY: Primary | ICD-10-CM

## 2024-10-11 ENCOUNTER — HOSPITAL ENCOUNTER (OUTPATIENT)
Dept: ULTRASOUND IMAGING | Age: 44
Discharge: HOME | End: 2024-10-13
Attending: INTERNAL MEDICINE
Payer: COMMERCIAL

## 2024-10-11 DIAGNOSIS — R94.5 NONSPECIFIC ABNORMAL RESULTS OF LIVER FUNCTION STUDY: ICD-10-CM

## 2024-10-11 PROCEDURE — 76705 ECHO EXAM OF ABDOMEN: CPT

## 2024-10-30 ENCOUNTER — OFFICE VISIT (OUTPATIENT)
Dept: PRIMARY CARE CLINIC | Age: 44
End: 2024-10-30
Payer: COMMERCIAL

## 2024-10-30 VITALS
DIASTOLIC BLOOD PRESSURE: 88 MMHG | WEIGHT: 160.4 LBS | BODY MASS INDEX: 26.73 KG/M2 | OXYGEN SATURATION: 99 % | HEART RATE: 102 BPM | HEIGHT: 65 IN | SYSTOLIC BLOOD PRESSURE: 138 MMHG

## 2024-10-30 DIAGNOSIS — M25.531 RIGHT WRIST PAIN: ICD-10-CM

## 2024-10-30 DIAGNOSIS — M72.2 PLANTAR FASCIITIS, BILATERAL: Primary | ICD-10-CM

## 2024-10-30 DIAGNOSIS — Z12.11 SCREENING FOR COLON CANCER: ICD-10-CM

## 2024-10-30 PROBLEM — F31.60 BIPOLAR AFFECTIVE DISORDER, CURRENT EPISODE MIXED, CURRENT EPISODE SEVERITY UNSPECIFIED (HCC): Status: RESOLVED | Noted: 2023-04-11 | Resolved: 2024-10-30

## 2024-10-30 PROBLEM — T84.84XA PAINFUL ORTHOPAEDIC HARDWARE (HCC): Status: RESOLVED | Noted: 2023-11-16 | Resolved: 2024-10-30

## 2024-10-30 PROCEDURE — G8427 DOCREV CUR MEDS BY ELIG CLIN: HCPCS | Performed by: STUDENT IN AN ORGANIZED HEALTH CARE EDUCATION/TRAINING PROGRAM

## 2024-10-30 PROCEDURE — 3075F SYST BP GE 130 - 139MM HG: CPT | Performed by: STUDENT IN AN ORGANIZED HEALTH CARE EDUCATION/TRAINING PROGRAM

## 2024-10-30 PROCEDURE — G8419 CALC BMI OUT NRM PARAM NOF/U: HCPCS | Performed by: STUDENT IN AN ORGANIZED HEALTH CARE EDUCATION/TRAINING PROGRAM

## 2024-10-30 PROCEDURE — 3079F DIAST BP 80-89 MM HG: CPT | Performed by: STUDENT IN AN ORGANIZED HEALTH CARE EDUCATION/TRAINING PROGRAM

## 2024-10-30 PROCEDURE — 4004F PT TOBACCO SCREEN RCVD TLK: CPT | Performed by: STUDENT IN AN ORGANIZED HEALTH CARE EDUCATION/TRAINING PROGRAM

## 2024-10-30 PROCEDURE — 99214 OFFICE O/P EST MOD 30 MIN: CPT | Performed by: STUDENT IN AN ORGANIZED HEALTH CARE EDUCATION/TRAINING PROGRAM

## 2024-10-30 PROCEDURE — G8484 FLU IMMUNIZE NO ADMIN: HCPCS | Performed by: STUDENT IN AN ORGANIZED HEALTH CARE EDUCATION/TRAINING PROGRAM

## 2024-10-30 RX ORDER — METHYLPREDNISOLONE 4 MG/1
TABLET ORAL
Qty: 1 KIT | Refills: 0 | Status: SHIPPED | OUTPATIENT
Start: 2024-10-30

## 2024-10-30 NOTE — PROGRESS NOTES
10/30/2024        Jeny Villanueva 1980 is a 44 y.o. female who presents today with:  Chief Complaint   Patient presents with    Foot Pain     Chronic both feet planter fasciitis     Wrist Pain     Chronic right wrist having a flare up        HPI: Establish care    Chronic plantar fasciitis  She states that she has been dealing with this for a long time and every now and then when it flares up she responds well to prednisone.  She uses shoe inserts.  She cannot take anti-inflammatories as she has a hematological problem causing bruising.    Bruising  She has seen hematology and testing has been negative.  She was told to stop using ibuprofen and her bruising has improved.    She is complaining of right wrist pain.  She states this has been going on for a very long time and it flares on and off.  She states that she responds well to a steroid.    Assessment & Plan   1. Plantar fasciitis, bilateral  -     methylPREDNISolone (MEDROL DOSEPACK) 4 MG tablet; Take by mouth as directed on package once daily, Disp-1 kit, R-0Normal  2. Right wrist pain  -     methylPREDNISolone (MEDROL DOSEPACK) 4 MG tablet; Take by mouth as directed on package once daily, Disp-1 kit, R-0Normal  3. Screening for colon cancer  -     FIT-DNA (Cologuard)     There are no discontinued medications.  Return in about 6 months (around 4/30/2025).        Objective  Allergies   Allergen Reactions    Latex      Added based on information entered during case entry, please review and add reactions, type, and severity as needed     Current Outpatient Medications   Medication Sig Dispense Refill    methylPREDNISolone (MEDROL DOSEPACK) 4 MG tablet Take by mouth as directed on package once daily 1 kit 0    amLODIPine (NORVASC) 10 MG tablet Take 1 tablet by mouth daily 90 tablet 3    losartan (COZAAR) 25 MG tablet Take 1 tablet by mouth daily 90 tablet 3    valACYclovir (VALTREX) 1 g tablet 1 g once daily for 5 days when you get an outbreak of herpes 50

## 2024-10-30 NOTE — PATIENT INSTRUCTIONS
Vitamin D level is low, this is an extremely common reason to be fatigued.  Even though normal level of vitamin D is 30, being below 50 can cause a significant amount of disorders. I recommend Vitamin D 2000 units daily or 5000 units every other day.    If you receive an email to fill out a survey about our care here today, I would greatly appreciate it if you could fill it out for me, thank you!

## 2024-12-04 LAB — NONINV COLON CA DNA+OCC BLD SCRN STL QL: NEGATIVE

## 2025-01-24 ENCOUNTER — OFFICE VISIT (OUTPATIENT)
Dept: PRIMARY CARE CLINIC | Age: 45
End: 2025-01-24

## 2025-01-24 VITALS
TEMPERATURE: 98.4 F | DIASTOLIC BLOOD PRESSURE: 98 MMHG | SYSTOLIC BLOOD PRESSURE: 138 MMHG | BODY MASS INDEX: 26.46 KG/M2 | WEIGHT: 159 LBS | RESPIRATION RATE: 18 BRPM | OXYGEN SATURATION: 88 % | HEART RATE: 88 BPM

## 2025-01-24 DIAGNOSIS — R07.9 CHEST PAIN, UNSPECIFIED TYPE: Primary | ICD-10-CM

## 2025-01-24 RX ORDER — KETOROLAC TROMETHAMINE 30 MG/ML
60 INJECTION, SOLUTION INTRAMUSCULAR; INTRAVENOUS ONCE
Status: COMPLETED | OUTPATIENT
Start: 2025-01-24 | End: 2025-01-24

## 2025-01-24 RX ORDER — KETOROLAC TROMETHAMINE 10 MG/1
10 TABLET, FILM COATED ORAL EVERY 6 HOURS PRN
Qty: 20 TABLET | Refills: 0 | Status: SHIPPED | OUTPATIENT
Start: 2025-01-24 | End: 2026-01-24

## 2025-01-24 RX ADMIN — KETOROLAC TROMETHAMINE 60 MG: 30 INJECTION, SOLUTION INTRAMUSCULAR; INTRAVENOUS at 17:41

## 2025-01-24 SDOH — ECONOMIC STABILITY: FOOD INSECURITY: WITHIN THE PAST 12 MONTHS, THE FOOD YOU BOUGHT JUST DIDN'T LAST AND YOU DIDN'T HAVE MONEY TO GET MORE.: NEVER TRUE

## 2025-01-24 SDOH — ECONOMIC STABILITY: FOOD INSECURITY: WITHIN THE PAST 12 MONTHS, YOU WORRIED THAT YOUR FOOD WOULD RUN OUT BEFORE YOU GOT MONEY TO BUY MORE.: NEVER TRUE

## 2025-01-24 ASSESSMENT — PATIENT HEALTH QUESTIONNAIRE - PHQ9
SUM OF ALL RESPONSES TO PHQ QUESTIONS 1-9: 0
5. POOR APPETITE OR OVEREATING: NOT AT ALL
2. FEELING DOWN, DEPRESSED OR HOPELESS: NOT AT ALL
SUM OF ALL RESPONSES TO PHQ QUESTIONS 1-9: 0
6. FEELING BAD ABOUT YOURSELF - OR THAT YOU ARE A FAILURE OR HAVE LET YOURSELF OR YOUR FAMILY DOWN: NOT AT ALL
8. MOVING OR SPEAKING SO SLOWLY THAT OTHER PEOPLE COULD HAVE NOTICED. OR THE OPPOSITE, BEING SO FIGETY OR RESTLESS THAT YOU HAVE BEEN MOVING AROUND A LOT MORE THAN USUAL: NOT AT ALL
1. LITTLE INTEREST OR PLEASURE IN DOING THINGS: NOT AT ALL
SUM OF ALL RESPONSES TO PHQ QUESTIONS 1-9: 0
4. FEELING TIRED OR HAVING LITTLE ENERGY: NOT AT ALL
9. THOUGHTS THAT YOU WOULD BE BETTER OFF DEAD, OR OF HURTING YOURSELF: NOT AT ALL
SUM OF ALL RESPONSES TO PHQ9 QUESTIONS 1 & 2: 0
7. TROUBLE CONCENTRATING ON THINGS, SUCH AS READING THE NEWSPAPER OR WATCHING TELEVISION: NOT AT ALL
3. TROUBLE FALLING OR STAYING ASLEEP: NOT AT ALL
SUM OF ALL RESPONSES TO PHQ QUESTIONS 1-9: 0
10. IF YOU CHECKED OFF ANY PROBLEMS, HOW DIFFICULT HAVE THESE PROBLEMS MADE IT FOR YOU TO DO YOUR WORK, TAKE CARE OF THINGS AT HOME, OR GET ALONG WITH OTHER PEOPLE: NOT DIFFICULT AT ALL

## 2025-01-24 ASSESSMENT — ENCOUNTER SYMPTOMS
SINUS PRESSURE: 0
VOMITING: 0
NAUSEA: 0
SORE THROAT: 0
SHORTNESS OF BREATH: 0
COUGH: 0
SINUS PAIN: 0
ABDOMINAL PAIN: 0
WHEEZING: 0
RHINORRHEA: 0
DIARRHEA: 0

## 2025-01-24 NOTE — PROGRESS NOTES
Subjective:      Patient ID: Jeny Villanueva is a 45 y.o. female      Chest pain x 3 weeks   HPI  Pt presents for 3 weeks of sudden onset intermittent sharp right lower chest pain rated 10/10, radiating around to the back. Pain is aggravated by coughing, breathing and body movements. No cough, SOB, fever or wheezing. Attests to heavy lifting at work.  No leg pain or swelliung. No long distance travel, no fam hx PE/DVT.     Past Medical History:   Diagnosis Date    ADHD     Breast cyst     HTN (hypertension)     Synovitis of left ankle 2023     Past Surgical History:   Procedure Laterality Date    ANKLE ARTHROSCOPY Left 2023    ANKLE ARTHROSCOPY SYNOVECTOMY LEFT ANKLE ARTHREX SCREW REMOVAL. performed by Matty Resendiz DPM at [facility]    ANKLE FRACTURE SURGERY Left 2023    OPEN REDUCTION INTERNAL FIXATION TRIMALLEOLAR ANKLE FRACTURE performed by Matty Resendiz DPM at [facility]    ANKLE SURGERY Left 2023    REMOVAL OF HARDWARE performed by Matty Resendiz DPM at [facility]    BREAST BIOPSY       SECTION      ,     TONSILLECTOMY AND ADENOIDECTOMY       Social History     Socioeconomic History    Marital status:      Spouse name: Not on file    Number of children: Not on file    Years of education: Not on file    Highest education level: Not on file   Occupational History    Not on file   Tobacco Use    Smoking status: Every Day     Types: Cigars    Smokeless tobacco: Never    Tobacco comments:     1 cigar/day   Vaping Use    Vaping status: Never Used   Substance and Sexual Activity    Alcohol use: Yes     Comment: weekly    Drug use: Never    Sexual activity: Yes     Partners: Male   Other Topics Concern    Not on file   Social History Narrative    Not on file     Social Determinants of Health     Financial Resource Strain: Low Risk  (2024)    Overall Financial Resource Strain (CARDIA)     Difficulty of Paying Living Expenses: Not hard at all   Food Insecurity: No

## 2025-03-01 ENCOUNTER — APPOINTMENT (OUTPATIENT)
Dept: GENERAL RADIOLOGY | Age: 45
End: 2025-03-01
Payer: COMMERCIAL

## 2025-03-01 ENCOUNTER — HOSPITAL ENCOUNTER (EMERGENCY)
Age: 45
Discharge: HOME | End: 2025-03-01
Attending: EMERGENCY MEDICINE
Payer: COMMERCIAL

## 2025-03-01 VITALS
OXYGEN SATURATION: 100 % | SYSTOLIC BLOOD PRESSURE: 158 MMHG | WEIGHT: 151.6 LBS | TEMPERATURE: 97.3 F | RESPIRATION RATE: 17 BRPM | HEIGHT: 65 IN | DIASTOLIC BLOOD PRESSURE: 122 MMHG | HEART RATE: 80 BPM | BODY MASS INDEX: 25.26 KG/M2

## 2025-03-01 DIAGNOSIS — I15.9 SECONDARY HYPERTENSION: Chronic | ICD-10-CM

## 2025-03-01 DIAGNOSIS — I10 HYPERTENSION, UNSPECIFIED TYPE: Primary | ICD-10-CM

## 2025-03-01 DIAGNOSIS — S60.221A CONTUSION OF RIGHT HAND, INITIAL ENCOUNTER: ICD-10-CM

## 2025-03-01 PROCEDURE — 99283 EMERGENCY DEPT VISIT LOW MDM: CPT

## 2025-03-01 PROCEDURE — 73130 X-RAY EXAM OF HAND: CPT

## 2025-03-01 PROCEDURE — 6370000000 HC RX 637 (ALT 250 FOR IP): Performed by: EMERGENCY MEDICINE

## 2025-03-01 RX ORDER — KETOROLAC TROMETHAMINE 30 MG/ML
30 INJECTION, SOLUTION INTRAMUSCULAR; INTRAVENOUS ONCE
Status: DISCONTINUED | OUTPATIENT
Start: 2025-03-01 | End: 2025-03-01 | Stop reason: HOSPADM

## 2025-03-01 RX ORDER — ACETAMINOPHEN 500 MG
500 TABLET ORAL 3 TIMES DAILY PRN
Qty: 30 TABLET | Refills: 0 | Status: SHIPPED | OUTPATIENT
Start: 2025-03-01

## 2025-03-01 RX ORDER — LIDOCAINE 50 MG/G
1 PATCH TOPICAL DAILY
Qty: 30 PATCH | Refills: 0 | Status: SHIPPED | OUTPATIENT
Start: 2025-03-01

## 2025-03-01 RX ORDER — KETOROLAC TROMETHAMINE 10 MG/1
10 TABLET, FILM COATED ORAL EVERY 6 HOURS PRN
Qty: 20 TABLET | Refills: 0 | Status: SHIPPED | OUTPATIENT
Start: 2025-03-01

## 2025-03-01 RX ORDER — LOSARTAN POTASSIUM 25 MG/1
25 TABLET ORAL ONCE
Status: COMPLETED | OUTPATIENT
Start: 2025-03-01 | End: 2025-03-01

## 2025-03-01 RX ORDER — AMLODIPINE BESYLATE 5 MG/1
10 TABLET ORAL ONCE
Status: COMPLETED | OUTPATIENT
Start: 2025-03-01 | End: 2025-03-01

## 2025-03-01 RX ORDER — LOSARTAN POTASSIUM 25 MG/1
25 TABLET ORAL DAILY
Qty: 90 TABLET | Refills: 3 | Status: SHIPPED | OUTPATIENT
Start: 2025-03-01

## 2025-03-01 RX ORDER — CLONIDINE HYDROCHLORIDE 0.1 MG/1
0.1 TABLET ORAL ONCE
Status: COMPLETED | OUTPATIENT
Start: 2025-03-01 | End: 2025-03-01

## 2025-03-01 RX ORDER — AMLODIPINE BESYLATE 10 MG/1
10 TABLET ORAL DAILY
Qty: 90 TABLET | Refills: 3 | Status: SHIPPED | OUTPATIENT
Start: 2025-03-01

## 2025-03-01 RX ADMIN — AMLODIPINE BESYLATE 10 MG: 5 TABLET ORAL at 13:49

## 2025-03-01 RX ADMIN — CLONIDINE HYDROCHLORIDE 0.1 MG: 0.1 TABLET ORAL at 13:49

## 2025-03-01 RX ADMIN — LOSARTAN POTASSIUM 25 MG: 25 TABLET, FILM COATED ORAL at 14:12

## 2025-03-01 ASSESSMENT — PAIN - FUNCTIONAL ASSESSMENT: PAIN_FUNCTIONAL_ASSESSMENT: 0-10

## 2025-03-01 ASSESSMENT — ENCOUNTER SYMPTOMS
VOMITING: 0
ABDOMINAL PAIN: 0
ROS SKIN COMMENTS: BRUISING

## 2025-03-01 ASSESSMENT — LIFESTYLE VARIABLES
HOW MANY STANDARD DRINKS CONTAINING ALCOHOL DO YOU HAVE ON A TYPICAL DAY: PATIENT DECLINED
HOW OFTEN DO YOU HAVE A DRINK CONTAINING ALCOHOL: 2-3 TIMES A WEEK

## 2025-03-01 ASSESSMENT — PAIN SCALES - GENERAL: PAINLEVEL_OUTOF10: 10

## 2025-03-01 NOTE — DISCHARGE INSTRUCTIONS
Return for worsening symptoms or concerns.  Medication as prescribed.  Follow-up with your doctor.  Thank you.

## 2025-03-01 NOTE — ED TRIAGE NOTES
Pt comes to er originally for her thumb that she injured last night but blood pressure noted to be extremely high on arrival.  Pt  admits to having a h/o htn but  is noncompliant with her meds and  does not even remember the last time she took one.  Pt said  she will not be admitted but would be  agreeable to a pill for bp

## 2025-03-01 NOTE — ED NOTES
Patient results not back yet; patient is ready to be discharged. RN informed that Dr will contact patient with results.

## 2025-03-01 NOTE — ED PROVIDER NOTES
[facility] EMERGENCY DEPARTMENT  EMERGENCY DEPARTMENT ENCOUNTER      Pt Name: Jeny Villanueva  MRN: [id number]  Birthdate 1980  Date of evaluation: 3/1/2025  Provider: Waldo Peralta MD  3:08 PM    CHIEF COMPLAINT       Chief Complaint   Patient presents with    Hand Injury     Injured thumb on right hand    Hypertension     Doesn't know when she took her medication last          HISTORY OF PRESENT ILLNESS    Jeny Villanueva is a 45 y.o. female who presents to the emergency department via private vehicle.  History comes in the patient.  She says that her dog pulled her yesterday causing her to slip and fall onto her right hand and a rock hit it.  Denied antecedent symptoms.  Denied hitting head, LOC, headache or vision change, chest pain, shortness of breath, numbness or weakness.  She wrapped her hand.  She has swelling and bruising and pain to the right palm and thumb and an abrasion.  No anticoagulation use.She denies that this was a dog bite.  Tetanus up-to-date per patient  HPI  Chart review notes history of hypertension with previous prescriptions for amlodipine and Cozaar  Nursing Notes were reviewed.    REVIEW OF SYSTEMS       Review of Systems   Eyes:  Negative for visual disturbance.   Cardiovascular:  Negative for chest pain.   Gastrointestinal:  Negative for abdominal pain and vomiting.   Genitourinary:  Negative for flank pain.   Musculoskeletal:  Positive for arthralgias and myalgias.   Skin:  Positive for wound.        bruising   Neurological:  Negative for syncope, weakness and numbness.   All other systems reviewed and are negative.      Except as noted above the remainder of the review of systems was reviewed and negative.       PAST MEDICAL HISTORY     Past Medical History:   Diagnosis Date    ADHD     Breast cyst     HTN (hypertension)     Synovitis of left ankle 11/16/2023         SURGICAL HISTORY       Past Surgical History:   Procedure Laterality Date    ANKLE ARTHROSCOPY Left 11/27/2023       DISPOSITION Decision To Discharge 03/01/2025 03:02:16 PM               PATIENT REFERRED TO:  Bhargavi Burgos MD  [address] [id number]  [phone]            DISCHARGE MEDICATIONS:  Discharge Medication List as of 3/1/2025  2:56 PM        START taking these medications    Details   !! ketorolac (TORADOL) 10 MG tablet Take 1 tablet by mouth every 6 hours as needed for Pain, Disp-20 tablet, R-0Normal      acetaminophen (TYLENOL) 500 MG tablet Take 1 tablet by mouth 3 times daily as needed for Pain, Disp-30 tablet, R-0Normal      lidocaine (LIDODERM) 5 % Place 1 patch onto the skin daily 12 hours on, 12 hours off., Disp-30 patch, R-0Normal       !! - Potential duplicate medications found. Please discuss with provider.        Controlled Substances Monitoring:          No data to display                (Please note that portions of this note were completed with a voice recognition program.  Efforts were made to edit the dictations but occasionally words are mis-transcribed.)    Waldo Peralta MD (electronically signed)  Attending Emergency Physician            Waldo Peralta MD  03/01/25 [id number]

## 2025-04-11 ENCOUNTER — OFFICE VISIT (OUTPATIENT)
Dept: PRIMARY CARE CLINIC | Age: 45
End: 2025-04-11
Payer: COMMERCIAL

## 2025-04-11 VITALS
TEMPERATURE: 98 F | OXYGEN SATURATION: 98 % | DIASTOLIC BLOOD PRESSURE: 82 MMHG | BODY MASS INDEX: 25.13 KG/M2 | HEART RATE: 87 BPM | WEIGHT: 151 LBS | SYSTOLIC BLOOD PRESSURE: 124 MMHG

## 2025-04-11 DIAGNOSIS — R05.1 ACUTE COUGH: ICD-10-CM

## 2025-04-11 DIAGNOSIS — J06.9 ACUTE UPPER RESPIRATORY INFECTION, UNSPECIFIED: Primary | ICD-10-CM

## 2025-04-11 LAB
INFLUENZA A ANTIBODY: NOT DETECTED
INFLUENZA B ANTIBODY: NOT DETECTED
Lab: NORMAL
PERFORMING INSTRUMENT: NORMAL
QC PASS/FAIL: NORMAL
SARS-COV-2, POC: NORMAL

## 2025-04-11 PROCEDURE — G8427 DOCREV CUR MEDS BY ELIG CLIN: HCPCS | Performed by: STUDENT IN AN ORGANIZED HEALTH CARE EDUCATION/TRAINING PROGRAM

## 2025-04-11 PROCEDURE — 3074F SYST BP LT 130 MM HG: CPT | Performed by: STUDENT IN AN ORGANIZED HEALTH CARE EDUCATION/TRAINING PROGRAM

## 2025-04-11 PROCEDURE — 87426 SARSCOV CORONAVIRUS AG IA: CPT | Performed by: STUDENT IN AN ORGANIZED HEALTH CARE EDUCATION/TRAINING PROGRAM

## 2025-04-11 PROCEDURE — 3079F DIAST BP 80-89 MM HG: CPT | Performed by: STUDENT IN AN ORGANIZED HEALTH CARE EDUCATION/TRAINING PROGRAM

## 2025-04-11 PROCEDURE — G8419 CALC BMI OUT NRM PARAM NOF/U: HCPCS | Performed by: STUDENT IN AN ORGANIZED HEALTH CARE EDUCATION/TRAINING PROGRAM

## 2025-04-11 PROCEDURE — 87804 INFLUENZA ASSAY W/OPTIC: CPT | Performed by: STUDENT IN AN ORGANIZED HEALTH CARE EDUCATION/TRAINING PROGRAM

## 2025-04-11 PROCEDURE — 4004F PT TOBACCO SCREEN RCVD TLK: CPT | Performed by: STUDENT IN AN ORGANIZED HEALTH CARE EDUCATION/TRAINING PROGRAM

## 2025-04-11 PROCEDURE — 99214 OFFICE O/P EST MOD 30 MIN: CPT | Performed by: STUDENT IN AN ORGANIZED HEALTH CARE EDUCATION/TRAINING PROGRAM

## 2025-04-11 RX ORDER — AZITHROMYCIN 500 MG/1
500 TABLET, FILM COATED ORAL DAILY
Qty: 3 TABLET | Refills: 0 | Status: SHIPPED | OUTPATIENT
Start: 2025-04-11 | End: 2025-04-14

## 2025-04-11 NOTE — PROGRESS NOTES
4/11/2025        Jeny Villanueva 1980 is a 45 y.o. female who presents today with:  Chief Complaint   Patient presents with    Congestion     Congestion, Cough since Monday        HPI:   Patient presents today due to sickness for 5 days  Pertinent positives: Coughing, congestion  Pertinent negatives: Shortness of breath, wheezing, nausea, vomiting, diarrhea  Treatments tried: OTC meds  Sick Contacts: Unknown     Assessment & Plan   1. Acute upper respiratory infection, unspecified  -     azithromycin (ZITHROMAX) 500 MG tablet; Take 1 tablet by mouth daily for 3 days, Disp-3 tablet, R-0Normal  2. Acute cough  -     POCT COVID-19, Antigen  -     POCT Influenza A/B     There are no discontinued medications.  Return if symptoms worsen or fail to improve.    All testing negative, treatment as above    Objective  Allergies   Allergen Reactions    Latex      Added based on information entered during case entry, please review and add reactions, type, and severity as needed     Current Outpatient Medications   Medication Sig Dispense Refill    azithromycin (ZITHROMAX) 500 MG tablet Take 1 tablet by mouth daily for 3 days 3 tablet 0    amLODIPine (NORVASC) 10 MG tablet Take 1 tablet by mouth daily 90 tablet 3    losartan (COZAAR) 25 MG tablet Take 1 tablet by mouth daily 90 tablet 3    ketorolac (TORADOL) 10 MG tablet Take 1 tablet by mouth every 6 hours as needed for Pain 20 tablet 0    acetaminophen (TYLENOL) 500 MG tablet Take 1 tablet by mouth 3 times daily as needed for Pain 30 tablet 0    lidocaine (LIDODERM) 5 % Place 1 patch onto the skin daily 12 hours on, 12 hours off. 30 patch 0    ketorolac (TORADOL) 10 MG tablet Take 1 tablet by mouth every 6 hours as needed for Pain 20 tablet 0    tiZANidine (ZANAFLEX) 4 MG tablet Take 1 tablet by mouth every 8 hours as needed (chest pain) 30 tablet 0    methylPREDNISolone (MEDROL DOSEPACK) 4 MG tablet Take by mouth as directed on package once daily 1 kit 0    NALTREXONE  Results sent

## 2025-04-11 NOTE — PATIENT INSTRUCTIONS
It is likely you have a viral infection as 90% of upper respiratory infections are viral.  The following treatments below are recommended for your symptoms.  Please try these and reach out if your symptoms have not improved after 10 days from when they began.  -Vitamin C 500 mg 2-3 times daily for 3 to 5 days  -Tylenol (500 mg every 6 hours or 1000 mg every 8 hours) OR ibuprofen (400 to 600 mg every 8 hours) for aches and chills and fever  -Beckie pot/saline nasal rinses/Flonase for nasal congestion, sinus pressure, nasal drainage  -Cepacol throat lozenges, Vicks vapocool, or Chloraseptic throat spray for throat pain  -Mucinex for just chest congestion, or Mucinex DM for chest congestion and cough

## 2025-04-14 ENCOUNTER — TELEPHONE (OUTPATIENT)
Dept: PRIMARY CARE CLINIC | Age: 45
End: 2025-04-14

## 2025-04-14 DIAGNOSIS — J06.9 ACUTE UPPER RESPIRATORY INFECTION, UNSPECIFIED: Primary | ICD-10-CM

## 2025-04-14 NOTE — TELEPHONE ENCOUNTER
Pt is still not feeling better and she was told to call back if she wasn't on Friday. Still having cough, congestion, and wheezing.

## 2025-07-03 ENCOUNTER — OFFICE VISIT (OUTPATIENT)
Dept: PRIMARY CARE CLINIC | Age: 45
End: 2025-07-03
Payer: COMMERCIAL

## 2025-07-03 ENCOUNTER — TELEPHONE (OUTPATIENT)
Dept: PRIMARY CARE CLINIC | Age: 45
End: 2025-07-03

## 2025-07-03 VITALS
BODY MASS INDEX: 25.16 KG/M2 | WEIGHT: 151 LBS | OXYGEN SATURATION: 98 % | HEIGHT: 65 IN | HEART RATE: 82 BPM | SYSTOLIC BLOOD PRESSURE: 132 MMHG | DIASTOLIC BLOOD PRESSURE: 82 MMHG

## 2025-07-03 DIAGNOSIS — F90.9 ADULT ATTENTION DEFICIT HYPERACTIVITY DISORDER: ICD-10-CM

## 2025-07-03 DIAGNOSIS — Z13.220 SCREENING, LIPID: ICD-10-CM

## 2025-07-03 DIAGNOSIS — Z13.1 SCREENING FOR DIABETES MELLITUS: ICD-10-CM

## 2025-07-03 DIAGNOSIS — S39.012S STRAIN OF LUMBAR REGION, SEQUELA: ICD-10-CM

## 2025-07-03 DIAGNOSIS — I10 PRIMARY HYPERTENSION: Primary | Chronic | ICD-10-CM

## 2025-07-03 DIAGNOSIS — M72.2 PLANTAR FASCIITIS, BILATERAL: ICD-10-CM

## 2025-07-03 PROBLEM — S39.012A STRAIN OF LUMBAR REGION: Status: ACTIVE | Noted: 2025-07-03

## 2025-07-03 PROBLEM — R58 ECCHYMOSIS: Status: ACTIVE | Noted: 2025-07-03

## 2025-07-03 PROCEDURE — G8419 CALC BMI OUT NRM PARAM NOF/U: HCPCS | Performed by: STUDENT IN AN ORGANIZED HEALTH CARE EDUCATION/TRAINING PROGRAM

## 2025-07-03 PROCEDURE — G8427 DOCREV CUR MEDS BY ELIG CLIN: HCPCS | Performed by: STUDENT IN AN ORGANIZED HEALTH CARE EDUCATION/TRAINING PROGRAM

## 2025-07-03 PROCEDURE — 4004F PT TOBACCO SCREEN RCVD TLK: CPT | Performed by: STUDENT IN AN ORGANIZED HEALTH CARE EDUCATION/TRAINING PROGRAM

## 2025-07-03 PROCEDURE — 3075F SYST BP GE 130 - 139MM HG: CPT | Performed by: STUDENT IN AN ORGANIZED HEALTH CARE EDUCATION/TRAINING PROGRAM

## 2025-07-03 PROCEDURE — 3079F DIAST BP 80-89 MM HG: CPT | Performed by: STUDENT IN AN ORGANIZED HEALTH CARE EDUCATION/TRAINING PROGRAM

## 2025-07-03 PROCEDURE — 99214 OFFICE O/P EST MOD 30 MIN: CPT | Performed by: STUDENT IN AN ORGANIZED HEALTH CARE EDUCATION/TRAINING PROGRAM

## 2025-07-03 RX ORDER — KETOROLAC TROMETHAMINE 10 MG/1
10 TABLET, FILM COATED ORAL EVERY 6 HOURS PRN
Qty: 120 TABLET | Refills: 5 | Status: SHIPPED | OUTPATIENT
Start: 2025-07-03

## 2025-07-03 NOTE — PROGRESS NOTES
7/3/2025        Jeny Villanueva 1980 is a 45 y.o. female who presents today with:  Chief Complaint   Patient presents with    Annual Exam     Physical exam for school needs tb        HPI: Follow-up    HTN  Patient denies any current chest pain, shortness of breath, palpitations, diaphoresis, lightheadedness.     ADHD  Sees psychiatry.  Patient states that med improves their difficulty with focus, motivation, attention and they would like to continue without decreasing the dose.  They deny any illicit drug use.     Chronic plantar fasciitis  She states that she has been dealing with this for a long time and every now and then when it flares up she responds well to prednisone.  She uses shoe inserts.  She cannot take anti-inflammatories as she has a hematological problem causing bruising.     Chronic lumbar pain  She states that she pulled a muscle in the past and it still bothers her.  She uses tizanidine as needed    Health Maintenance  Colonoscopy: CG negative 11/27/2024  Pap smear: 4/2022, normal, negative HPV  Mammogram: 9/2024, normal  DEXA: Not due  PNA VAX: not due  Shingles VAX: not due  Tetanus VAX:  NA    Assessment & Plan   1. Primary hypertension  -     Comprehensive Metabolic Panel; Future  -     CBC with Auto Differential; Future  -     Magnesium; Future  2. Adult attention deficit hyperactivity disorder  3. Plantar fasciitis, bilateral  -     ketorolac (TORADOL) 10 MG tablet; Take 1 tablet by mouth every 6 hours as needed for Pain, Disp-120 tablet, R-5Normal  4. Strain of lumbar region, sequela  -     tiZANidine (ZANAFLEX) 4 MG tablet; Take 1 tablet by mouth every 8 hours as needed (chest pain), Disp-90 tablet, R-5Normal  5. Screening for diabetes mellitus  -     Hemoglobin A1C; Future  6. Screening, lipid  -     Lipid, Fasting; Future     Medications Discontinued During This Encounter   Medication Reason    ketorolac (TORADOL) 10 MG tablet LIST CLEANUP    methylPREDNISolone (MEDROL DOSEPACK) 4 MG

## 2025-07-03 NOTE — TELEPHONE ENCOUNTER
[facility] calling has questions on Toradol that was sent in  That medication not recommended past 5 days, not recommended for long time use  Please advise

## 2025-07-08 ENCOUNTER — CLINICAL SUPPORT (OUTPATIENT)
Dept: PRIMARY CARE CLINIC | Age: 45
End: 2025-07-08

## 2025-07-08 DIAGNOSIS — Z23 NEED FOR VACCINATION TO PREVENT TUBERCULOSIS: Primary | ICD-10-CM

## 2025-07-08 NOTE — PROGRESS NOTES
Patient in today for TB test  Administered in Left forearm intradermally with no incident  Will return in 48-72 hours to have this read

## 2025-07-10 ENCOUNTER — CLINICAL SUPPORT (OUTPATIENT)
Dept: PRIMARY CARE CLINIC | Age: 45
End: 2025-07-10

## 2025-07-10 DIAGNOSIS — Z23 NEED FOR VACCINATION TO PREVENT TUBERCULOSIS: Primary | ICD-10-CM

## 2025-07-10 NOTE — PROGRESS NOTES
PPD Reading Note  PPD read and results entered in [email or web address].  Result: 0 mm induration.  Interpretation: Good  If test not read within 48-72 hours of initial placement, patient advised to repeat in other arm 1-3 weeks after this test.  Allergic reaction: yes

## 2025-07-24 DIAGNOSIS — Z13.220 SCREENING, LIPID: ICD-10-CM

## 2025-07-24 DIAGNOSIS — Z13.29 THYROID DISORDER SCREEN: ICD-10-CM

## 2025-07-24 DIAGNOSIS — N92.6 ABNORMAL MENSTRUAL CYCLE: ICD-10-CM

## 2025-07-24 DIAGNOSIS — Z13.1 SCREENING FOR DIABETES MELLITUS: ICD-10-CM

## 2025-07-24 DIAGNOSIS — I10 PRIMARY HYPERTENSION: Chronic | ICD-10-CM

## 2025-07-24 LAB
ALBUMIN SERPL-MCNC: 4.2 G/DL (ref 3.5–4.6)
ALP SERPL-CCNC: 123 U/L (ref 40–130)
ALT SERPL-CCNC: 13 U/L (ref 0–33)
ANION GAP SERPL CALCULATED.3IONS-SCNC: 10 MEQ/L (ref 9–15)
AST SERPL-CCNC: 18 U/L (ref 0–35)
BASOPHILS # BLD: 0.1 K/UL (ref 0–0.2)
BASOPHILS NFR BLD: 0.9 %
BILIRUB SERPL-MCNC: 0.3 MG/DL (ref 0.2–0.7)
BUN SERPL-MCNC: 12 MG/DL (ref 6–20)
CALCIUM SERPL-MCNC: 9 MG/DL (ref 8.5–9.9)
CHLORIDE SERPL-SCNC: 105 MEQ/L (ref 95–107)
CHOLEST SERPL-MCNC: 183 MG/DL (ref 0–199)
CO2 SERPL-SCNC: 25 MEQ/L (ref 20–31)
CREAT SERPL-MCNC: 0.68 MG/DL (ref 0.5–0.9)
EOSINOPHIL # BLD: 0.1 K/UL (ref 0–0.7)
EOSINOPHIL NFR BLD: 2.3 %
ERYTHROCYTE [DISTWIDTH] IN BLOOD BY AUTOMATED COUNT: 13.6 % (ref 11.5–14.5)
FOLLICLE STIMULATING HORMONE: 48.3 MIU/ML
GLOBULIN SER CALC-MCNC: 3.2 G/DL (ref 2.3–3.5)
GLUCOSE SERPL-MCNC: 91 MG/DL (ref 70–99)
HCT VFR BLD AUTO: 41.3 % (ref 37–47)
HDLC SERPL-MCNC: 62 MG/DL (ref 40–59)
HGB BLD-MCNC: 12.9 G/DL (ref 12–16)
LDL CHOLESTEROL: 105 MG/DL (ref 0–129)
LH: 40.9 MIU/ML (ref 1.7–8.6)
LYMPHOCYTES # BLD: 1.4 K/UL (ref 1–4.8)
LYMPHOCYTES NFR BLD: 25.2 %
MAGNESIUM SERPL-MCNC: 2 MG/DL (ref 1.7–2.4)
MCH RBC QN AUTO: 27.8 PG (ref 27–31.3)
MCHC RBC AUTO-ENTMCNC: 31.2 % (ref 33–37)
MCV RBC AUTO: 89 FL (ref 79.4–94.8)
MONOCYTES # BLD: 0.4 K/UL (ref 0.2–0.8)
MONOCYTES NFR BLD: 7.5 %
NEUTROPHILS # BLD: 3.6 K/UL (ref 1.4–6.5)
NEUTS SEG NFR BLD: 63.6 %
PLATELET # BLD AUTO: 250 K/UL (ref 130–400)
POTASSIUM SERPL-SCNC: 4.4 MEQ/L (ref 3.4–4.9)
PROT SERPL-MCNC: 7.4 G/DL (ref 6.3–8)
RBC # BLD AUTO: 4.64 M/UL (ref 4.2–5.4)
SODIUM SERPL-SCNC: 140 MEQ/L (ref 135–144)
TRIGLYCERIDE, FASTING: 79 MG/DL (ref 0–150)
TSH REFLEX: 0.71 UIU/ML (ref 0.44–3.86)
WBC # BLD AUTO: 5.6 K/UL (ref 4.8–10.8)

## 2025-07-25 LAB
ESTIMATED AVERAGE GLUCOSE: 103 MG/DL
HBA1C MFR BLD: 5.2 % (ref 4–6)

## (undated) DEVICE — SINGLE PORT MANIFOLD: Brand: NEPTUNE 2

## (undated) DEVICE — SUTURE MCRYL SZ 4-0 L27IN ABSRB UD L19MM PS-2 1/2 CIR PRIM Y426H

## (undated) DEVICE — SUTURE ETHLN SZ 3-0 L18IN NONABSORBABLE BLK PS-2 L19MM 3/8 1669H

## (undated) DEVICE — SYRINGE MED 10ML LUERLOCK TIP W/O SFTY DISP

## (undated) DEVICE — TUBING, SUCTION, 9/32" X 12', STRAIGHT: Brand: MEDLINE INDUSTRIES, INC.

## (undated) DEVICE — COUNTER NDL 40 COUNT HLD 70 FOAM BLK ADH W/ MAG

## (undated) DEVICE — PADDING CAST W4INXL4YD SPUN DACRON POLY POR NON STERILE

## (undated) DEVICE — TOWEL,OR,DSP,ST,BLUE,STD,4/PK,20PK/CS: Brand: MEDLINE

## (undated) DEVICE — BANDAGE,ELASTIC,ESMARK,STERILE,4"X9',LF: Brand: MEDLINE

## (undated) DEVICE — BIT DRL DIA3.5MM TRIM-IT

## (undated) DEVICE — SYRINGE IRRIG 60ML SFT PLIABLE BLB EZ TO GRP 1 HND USE W/

## (undated) DEVICE — MARKER SURG SKIN GENTIAN VLT REG TIP W/ 6IN RUL DYNJSM01

## (undated) DEVICE — BLADE,CARBON-STEEL,15,STRL,DISPOSABLE,TB: Brand: MEDLINE

## (undated) DEVICE — SPONGE,LAP,18"X18",DLX,XR,ST,5/PK,40/PK: Brand: MEDLINE

## (undated) DEVICE — INTENDED FOR TISSUE SEPARATION, AND OTHER PROCEDURES THAT REQUIRE A SHARP SURGICAL BLADE TO PUNCTURE OR CUT.: Brand: BARD-PARKER ® CARBON RIB-BACK BLADES

## (undated) DEVICE — BANDAGE COMPR W6INXL5YD WHT BGE POLY COT M E WRP WV HK AND

## (undated) DEVICE — SET,IRRIGATION,CYSTO/TUR: Brand: MEDLINE

## (undated) DEVICE — BIT DRL DIA2.6MM CANN FOR ANK FRAC MGMT SYS

## (undated) DEVICE — TUBING PMP L8FT LNG W/ CONN FOR AR-6400 REDEUCE

## (undated) DEVICE — GLOVE ORTHO 7 1/2   MSG9475

## (undated) DEVICE — [AGGRESSIVE PLUS, SMALL-JOINT CUTTER, ARTHROSCOPIC SHAVER BLADE,  DO NOT RESTERILIZE,  DO NOT USE IF PACKAGE IS DAMAGED,  KEEP DRY,  KEEP AWAY FROM SUNLIGHT]: Brand: FORMULA

## (undated) DEVICE — PACK,ARTHROSCOPY II,SIRUS: Brand: MEDLINE

## (undated) DEVICE — NEPTUNE E-SEP SMOKE EVACUATION PENCIL, COATED, 70MM BLADE, PUSH BUTTON SWITCH: Brand: NEPTUNE E-SEP

## (undated) DEVICE — SUTURE VCRL SZ 3-0 L27IN ABSRB UD L26MM SH 1/2 CIR J416H

## (undated) DEVICE — GUIDEWIRE ORTH L150MM DIA0.053IN W/ TRCR TIP FOR ANK FRAC

## (undated) DEVICE — SUTURE VCRL SZ 4-0 L18IN ABSRB UD L19MM PS-2 3/8 CIR PRIM J496H

## (undated) DEVICE — COVER LT HNDL BLU PLAS

## (undated) DEVICE — PAD,ABDOMINAL,8"X10",ST,LF: Brand: MEDLINE

## (undated) DEVICE — 1010 S-DRAPE TOWEL DRAPE 10/BX: Brand: STERI-DRAPE™

## (undated) DEVICE — KIT ARMOR C DRP COLLAPSIBLE AND SELF EXP TOP CVR FOR FLUOROSCOPIC

## (undated) DEVICE — SUTURE MCRYL SZ 3-0 L27IN ABSRB UD L19MM PS-2 3/8 CIR PRIM Y427H

## (undated) DEVICE — ANCHOR FIX DISP FOR ANK FRAC SYS BB-TAK

## (undated) DEVICE — BLADE SAW W9.5XL41.5MM THK0.4MM CUT THK0.6MM REPL FN SAG

## (undated) DEVICE — LABEL MED MINI W/ MARKER

## (undated) DEVICE — SPONGE GZ W4XL4IN COT 12 PLY TYP VII WVN C FLD DSGN STERILE

## (undated) DEVICE — DRESSING GZ W1XL8IN COT XRFRM N ADH OVERWRAP CURAD

## (undated) DEVICE — NEEDLE SPNL 18GA L3.5IN W/ QNCKE SHARPER BVL DURA CLICK

## (undated) DEVICE — MARKER SURG SKIN GENTIAN VLT REG TIP W/ 6IN RUL

## (undated) DEVICE — PADDING CAST W4INXL4YD HIGHLY ABSRB THAN COT EZ APPL

## (undated) DEVICE — APPLICATOR MEDICATED 26 CC SOLUTION HI LT ORNG CHLORAPREP

## (undated) DEVICE — GOWN,AURORA,NONREINFORCED,LARGE: Brand: MEDLINE

## (undated) DEVICE — ELECTRODE PT RET AD L9FT HI MOIST COND ADH HYDRGEL CORDED

## (undated) DEVICE — SCREW BNE L16MM DIA3.5MM ANK TI LO PROF
Type: IMPLANTABLE DEVICE | Site: ANKLE | Status: NON-FUNCTIONAL
Removed: 2023-01-04

## (undated) DEVICE — BIT DRL DIA2.5MM FOR ANK FRAC MGMT SYS

## (undated) DEVICE — BIT DRL DIA2MM CALIB FOR ANK FRAC MGMT SYS

## (undated) DEVICE — SPLINT ORTH DBL SIDE 15 FTX4 IN RL PADDED FIBERGLASS LF

## (undated) DEVICE — ZIMMER® STERILE DISPOSABLE TOURNIQUET CUFF, DUAL PORT, SINGLE BLADDER, 18 IN. (46 CM)

## (undated) DEVICE — BIT DRL DIA2.5MM LNG GRAD FOR ANK FRAC MGMT SYS

## (undated) DEVICE — GAUZE,SPONGE,4"X4",16PLY,XRAY,STRL,LF: Brand: MEDLINE

## (undated) DEVICE — HYPODERMIC SAFETY NEEDLE: Brand: MAGELLAN

## (undated) DEVICE — SYRINGE MED 30ML STD CLR PLAS LUERLOCK TIP N CTRL DISP

## (undated) DEVICE — BANDAGE COMPR W4INXL5YD WHT BGE POLY COT M E WRP WV HK AND

## (undated) DEVICE — NEEDLE SPINAL 22GA L3.5IN SPINOCAN

## (undated) DEVICE — 4-PORT MANIFOLD: Brand: NEPTUNE 2

## (undated) DEVICE — BANDAGE COBAN 4 IN COMPR W4INXL5YD FOAM COHESIVE QUIK STK SELF ADH SFT

## (undated) DEVICE — PADDING CAST W6INXL4YD RAYON UNDERCAST SOF-ROL

## (undated) DEVICE — Device

## (undated) DEVICE — Z INACTIVE USE 2854267 SPONGE GZ W4XL4IN COT 12 PLY TYP VII WVN C FLD DSGN

## (undated) DEVICE — BLADE SAW W9XL25MM THK0.38MM CUT THK0.43MM REPL SAG OSC THN

## (undated) DEVICE — PADDING UNDERCAST W4INXL12FT RAYON POLY SYN NONADHESIVE

## (undated) DEVICE — NEEDLE HYPO 25GA L1.5IN BLU POLYPR HUB S STL REG BVL STR

## (undated) DEVICE — 3M™ STERI-DRAPE™ U-DRAPE 1015: Brand: STERI-DRAPE™